# Patient Record
Sex: FEMALE | Race: WHITE | Employment: FULL TIME | URBAN - METROPOLITAN AREA
[De-identification: names, ages, dates, MRNs, and addresses within clinical notes are randomized per-mention and may not be internally consistent; named-entity substitution may affect disease eponyms.]

---

## 2017-02-09 ENCOUNTER — HOSPITAL ENCOUNTER (EMERGENCY)
Facility: CLINIC | Age: 52
Discharge: HOME | End: 2017-02-09
Attending: EMERGENCY MEDICINE | Admitting: EMERGENCY MEDICINE
Payer: COMMERCIAL

## 2017-02-09 ENCOUNTER — TELEPHONE (OUTPATIENT)
Dept: FAMILY MEDICINE | Facility: CLINIC | Age: 52
End: 2017-02-09

## 2017-02-09 VITALS
BODY MASS INDEX: 23.92 KG/M2 | TEMPERATURE: 98.6 F | SYSTOLIC BLOOD PRESSURE: 131 MMHG | WEIGHT: 135 LBS | DIASTOLIC BLOOD PRESSURE: 87 MMHG | HEIGHT: 63 IN | RESPIRATION RATE: 18 BRPM | OXYGEN SATURATION: 96 %

## 2017-02-09 DIAGNOSIS — H53.8 BLURRED VISION: ICD-10-CM

## 2017-02-09 PROCEDURE — 25000125 ZZHC RX 250

## 2017-02-09 PROCEDURE — 99283 EMERGENCY DEPT VISIT LOW MDM: CPT

## 2017-02-09 RX ORDER — PROPARACAINE HYDROCHLORIDE 5 MG/ML
SOLUTION/ DROPS OPHTHALMIC
Status: COMPLETED
Start: 2017-02-09 | End: 2017-02-09

## 2017-02-09 RX ADMIN — PROPARACAINE HYDROCHLORIDE: 5 SOLUTION/ DROPS OPHTHALMIC at 11:35

## 2017-02-09 ASSESSMENT — ENCOUNTER SYMPTOMS
DIZZINESS: 0
HEADACHES: 0
TROUBLE SWALLOWING: 0
SPEECH DIFFICULTY: 0

## 2017-02-09 NOTE — DISCHARGE INSTRUCTIONS
Blurred Vision  Blurred vision is the loss of sharpness of vision and the inability to see small details. Any changes in your vision, whether sudden or gradual, should be checked out by an eye specialist.  Vision changes can be caused by many different things. These include eye disease, side effects of some medicines, or a condition like diabetes. Vision changes should never be ignored. It is common to assume that vision changes are due to needing more powerful vision correction. Making this assumption, many people postpone seeing their health care provider about their vision changes. Delaying care is risky, however. Some eye problems, if left untreated, can lead to permanent vision loss that is not correctable with glasses. This can significantly reduce quality of life.  Home care    Make changes in your home to reduce the risk of falling.    Keep walkways clear of objects you may trip over. Use nonslip pads under rugs.    Do not walk in poorly lit areas.    Be cautious when stepping up and down from curbs and walking on uneven sidewalks.    Brighter lighting in your home may help you see better.  Follow-up care  Follow up with an eye specialist or as advised. There are two types of eye doctor you can consult:     An optometrist is a licensed doctor of optometry. (Optometrists are not medical doctors.) Optometrists specialize in eye exams and may diagnose some eye problems. They also prescribe glasses and contact lenses.    An ophthalmologist is a medical doctor who specializes in eye care. Ophthalmologists diagnose and treat all eye diseases, prescribe medicines, and perform eye surgery. They may also prescribe glasses and contact lenses.  An optometrist can provide a basic screening eye exam for much less cost than an ophthalmologist. The optometrist can tell you if your condition needs the services of an ophthalmologist.  When to seek medical advice  Call your health care provider right away if any of these  occur.    Sudden change in your vision    Eye pain, redness, or discharge from your eyelid    No improvement or worsening of blurriness    Dark spots in your field of vision    Halos around lights    Floaters (dots or strings moving across your field of vision)    Sudden flash of light inside your eye    Dimness of vision    Partial or complete loss of vision    8258-6275 [employer]. [address] [place] [address]. All rights reserved. This information is not intended as a substitute for professional medical care. Always follow your healthcare professional's instructions.

## 2017-02-09 NOTE — ED AVS SNAPSHOT
Emergency Department    [address] [place] [id number]    Phone:  [phone]    Fax:  [phone]                                       Mercedes Patel   MRN: [id number]    Department:   Emergency Department   Date of Visit:  2/9/2017           Patient Information     Date Of Birth          1965        Your diagnoses for this visit were:     Blurred vision        You were seen by Chaka Busch MD.      Follow-up Information     Follow up with Mendez Carr In 1 day.    Why:  3:45 pm today his partner in the clinic        Discharge Instructions         Blurred Vision  Blurred vision is the loss of sharpness of vision and the inability to see small details. Any changes in your vision, whether sudden or gradual, should be checked out by an eye specialist.  Vision changes can be caused by many different things. These include eye disease, side effects of some medicines, or a condition like diabetes. Vision changes should never be ignored. It is common to assume that vision changes are due to needing more powerful vision correction. Making this assumption, many people postpone seeing their health care provider about their vision changes. Delaying care is risky, however. Some eye problems, if left untreated, can lead to permanent vision loss that is not correctable with glasses. This can significantly reduce quality of life.  Home care    Make changes in your home to reduce the risk of falling.    Keep walkways clear of objects you may trip over. Use nonslip pads under rugs.    Do not walk in poorly lit areas.    Be cautious when stepping up and down from curbs and walking on uneven sidewalks.    Brighter lighting in your home may help you see better.  Follow-up care  Follow up with an eye specialist or as advised. There are two types of eye doctor you can consult:     An optometrist is a licensed doctor of optometry. (Optometrists are not medical doctors.) Optometrists specialize in eye  exams and may diagnose some eye problems. They also prescribe glasses and contact lenses.    An ophthalmologist is a medical doctor who specializes in eye care. Ophthalmologists diagnose and treat all eye diseases, prescribe medicines, and perform eye surgery. They may also prescribe glasses and contact lenses.  An optometrist can provide a basic screening eye exam for much less cost than an ophthalmologist. The optometrist can tell you if your condition needs the services of an ophthalmologist.  When to seek medical advice  Call your health care provider right away if any of these occur.    Sudden change in your vision    Eye pain, redness, or discharge from your eyelid    No improvement or worsening of blurriness    Dark spots in your field of vision    Halos around lights    Floaters (dots or strings moving across your field of vision)    Sudden flash of light inside your eye    Dimness of vision    Partial or complete loss of vision    8212-6083 [employer]. [address] [place] [address]. All rights reserved. This information is not intended as a substitute for professional medical care. Always follow your healthcare professional's instructions.          24 Hour Appointment Hotline       To make an appointment at any [facility], call [phone] ([phone]). If you don't have a family doctor or clinic, we will help you find one. [facility] are conveniently located to serve the needs of you and your family.             Review of your medicines      Our records show that you are taking the medicines listed below. If these are incorrect, please call your family doctor or clinic.        Dose / Directions Last dose taken    cyclobenzaprine 5 MG tablet   Commonly known as:  FLEXERIL   Dose:  5 mg   Quantity:  30 tablet        Take 1 tablet (5 mg) by mouth nightly as needed for muscle spasms   Refills:  1        fexofenadine 180 MG tablet   Commonly known as:  ALLEGRA    Dose:  180 mg   Quantity:  90 tablet        Take 1 tablet by mouth daily.   Refills:  1        fluticasone 50 MCG/ACT spray   Commonly known as:  FLONASE   Quantity:  16 g        SPRAY 2 SPRAYS INTO NOSTRIL DAILY   Refills:  2        OMEGA-3 FISH OIL PO        Take by mouth daily   Refills:  0        PROBIOTIC DAILY Caps   Dose:  1 capsule        Take 1 capsule by mouth daily   Refills:  0        vitamin B complex with vitamin C Tabs tablet   Dose:  1 tablet        Take 1 tablet by mouth daily   Refills:  0        vitamin D 1000 UNITS capsule   Dose:  2 capsule        Take 2 capsules by mouth daily   Refills:  0                Orders Needing Specimen Collection     None      Pending Results     No orders found from 2/8/2017 to 2/10/2017.            Pending Culture Results     No orders found from 2/8/2017 to 2/10/2017.             Test Results from your hospital stay            Clinical Quality Measure: Blood Pressure Screening     Your blood pressure was checked while you were in the emergency department today. The last reading we obtained was  BP: 131/87 mmHg . Please read the guidelines below about what these numbers mean and what you should do about them.  If your systolic blood pressure (the top number) is less than 120 and your diastolic blood pressure (the bottom number) is less than 80, then your blood pressure is normal. There is nothing more that you need to do about it.  If your systolic blood pressure (the top number) is 120-139 or your diastolic blood pressure (the bottom number) is 80-89, your blood pressure may be higher than it should be. You should have your blood pressure rechecked within a year by a primary care provider.  If your systolic blood pressure (the top number) is 140 or greater or your diastolic blood pressure (the bottom number) is 90 or greater, you may have high blood pressure. High blood pressure is treatable, but if left untreated over time it can put you at risk for heart  attack, stroke, or kidney failure. You should have your blood pressure rechecked by a primary care provider within the next 4 weeks.  If your provider in the emergency department today gave you specific instructions to follow-up with your doctor or provider even sooner than that, you should follow that instruction and not wait for up to 4 weeks for your follow-up visit.        Thank you for choosing [facility]       Thank you for choosing [facility] for your care. Our goal is always to provide you with excellent care. Hearing back from our patients is one way we can continue to improve our services. Please take a few minutes to complete the written survey that you may receive in the mail after you visit with us. Thank you!        [employer] Information     [employer] gives you secure access to your electronic health record. If you see a primary care provider, you can also send messages to your care team and make appointments. If you have questions, please call your primary care clinic.  If you do not have a primary care provider, please call [phone] and they will assist you.        Care EveryWhere ID     This is your Care EveryWhere ID. This could be used by other organizations to access your [facility] medical records  [id number]        After Visit Summary       This is your record. Keep this with you and show to your community pharmacist(s) and doctor(s) at your next visit.

## 2017-02-09 NOTE — ED AVS SNAPSHOT
Emergency Department    [address] [place] [id number]    Phone:  [phone]    Fax:  [phone]                                       Mercedes Patel   MRN: [id number]    Department:   Emergency Department   Date of Visit:  2/9/2017           After Visit Summary Signature Page     I have received my discharge instructions, and my questions have been answered. I have discussed any challenges I see with this plan with the nurse or doctor.    ..........................................................................................................................................  Patient/Patient Representative Signature      ..........................................................................................................................................  Patient Representative Print Name and Relationship to Patient    ..................................................               ................................................  Date                                            Time    ..........................................................................................................................................  Reviewed by Signature/Title    ...................................................              ..............................................  Date                                                            Time

## 2017-02-09 NOTE — TELEPHONE ENCOUNTER
Patient is currently at work in a [employer] and has blurry vision-  has swollen eyes-dx with blepharitis for a long time-never had blurred vision with this- has seen multiple eye specialists for it  today eyes are blurry- /103 left arm, 142/90 right arm  Denies HA, drowsiness, sob,CP, nose bleeds, dizziness  huddled with Zenaida Arriola CNP- advised that patient should go to the ER  Patient agrees with recomendation    Guideline used:  Telephone Triage Protocols for Nurses, Fifth Edition, Hope Hector,RN  [facility]  [phone]

## 2017-02-09 NOTE — ED PROVIDER NOTES
History     Chief Complaint:  Hypertension    HPI   Mercedes Patel is a 51 year old female who presents to the emergency department today for evaluation of hypertension. The patient was at home and noticed while reading that her vision seemed more blurry. 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 She put her reading glasses on and was able to read but stated that the words were fuzzy and her blurriness seemed at it's worst when she was reading the computer, worse in the left eye but still blurry in both. She had another nurse take her blood pressure and it was in the 130's systolic. She called her doctor and was told to present to the ED. She has no pain in either eye but notes that since April her eyes have seemed swollen and sore. She is not currently on any blood pressure medications but reports she was on Amlodipine several years ago and Spironolactone for her acne up until a year ago that had helped with her blood pressure. Since going off of that medication she has felt that her blood pressure has been slowly creeping up, with her last blood pressure at the clinic being in the 130's/80's. She does not wear contacts but has seen multiple doctors for her chronic blepharitis, which she notes has not been any better or worse as of late. She also notes that she has a stye in her left eye. She denies smoking or any drug or alcohol use. She has had headaches previously but never been diagnosed with migraines. She denies any change in her recent stress or work load. Additionally, the patient has had no trouble with speech, trouble swallowing or any dizziness.     Allergies:  Erythromycin, rash   Penicillins, rash   Seasonal allergies  Sulfa Drugs, rash      Medications:    Omega 3 Fatty Acids  Flexeril   Vitamin B complex with vitamin C  Flonase  Vitamin D  Allegra    Past Medical History:    Nontoxic multinodular  "goiter  Disorders of bursae and tendons in shoulder region  Perennial allergic rhinitis  Headache  Unspecified sinusitis   Leiomyoma of uterus  Other acne  GERD  Menorrhagia  Chronic fatigue  Insomnia  Constipation   Nausea    Past Surgical History:    Tonsillectomy   Hc MRI brain w/o contrast (2004)   Hc CT maxillofacial w/o contrast (2006)   Sono pelvis complete (2006, 2008, 2011)   Hysteroscopy, ablation endometrium (2008)   Hc us soft tissue head/neck (2008, 2010)   US abdomen limited portable (2011)   Endoscopy     Family History:    Father - Hypertension, Lipids, blood disease   Mother - Lipids, Hypertension   Maternal Grandfather - Cerebrovascular Disease, Colorectal Cancer     Social History:  The patient was unaccompanied to the ED.  Smoking Status: Negative  Smokeless Tobacco: Negative  Alcohol Use: Rarely  Marital Status:   [2]     Review of Systems   HENT: Negative for trouble swallowing.    Eyes: Positive for visual disturbance (blurry vision bilaterally, worse on the left).   Neurological: Negative for dizziness, speech difficulty and headaches.     Physical Exam   Vitals:   Patient Vitals for the past 24 hrs:   BP Temp Temp src Heart Rate Resp SpO2 Height Weight   02/09/17 1045 131/87 mmHg - - - - 96 % - -   02/09/17 1030 (!) 126/96 mmHg - - - - 97 % - -   02/09/17 1015 156/86 mmHg - - - - 97 % - -   02/09/17 1000 (!) 158/91 mmHg - - - - 96 % - -   02/09/17 0945 (!) 155/99 mmHg - - - - (!) 77 % - -   02/09/17 0930 (!) 162/110 mmHg 98.6  F (37  C) Oral 121 18 100 % 1.6 m (5' 3\") 61.236 kg (135 lb)      Physical Exam     GENERAL: well developed, pleasant  HEAD: atraumatic  EYES: pupils reactive, extraocular muscles intact, conjunctivae normal, stye to left lower lid, pupils equal, sharp fundoscopic disk, no uptake in fluorescein stain, interocular pressure 16 and 17  ENT:  mucus membranes moist  NECK:  trachea midline, normal range of motion  RESPIRATORY: no tachypnea, breath sounds clear to " auscultation   CVS: normal S1/S2, no murmurs, intact distal pulses  ABDOMEN: soft, nontender, nondistention  MUSCULOSKELETAL: no deformities  SKIN: warm and dry, no acute rashes or ulceration  NEURO: GCS 15, cranial nerves intact, alert and oriented x3  PSYCH:  Mood/affect normal    Emergency Department Course     Emergency Department Course:  Nursing notes and vitals reviewed.  I performed an exam of the patient as documented above.   At 1100 the patient was rechecked.   1123 I spoke with Dr. Wagner of the opthalmology service regarding patient's presentation, findings, and plan of care. He will see the patient later today at 3:45 PM.   1128 Patient was rechecked.   I discussed the treatment plan with the patient. She expressed understanding of this plan and consented to discharge. She will be discharged home with instructions for care and follow up. In addition, the patient will return to the emergency department if their symptoms persist, worsen, if new symptoms arise or if there is any concern.  All questions were answered.    Impression & Plan      Medical Decision Making:  Mercedes Patel is a 51 year old female who presents with blurred vision. Considered differential including ocular migraine or atypical migraine, a corneal issue given her recent stye and blepharitis versus others. Fundoscopically looks normal, did not see any cell or flare to suggest iritis. She does have a small stye in the inferior aspect of her left eye. Did speak with her ophthalmologist and they can see her today at 3:45. There was concern about her blood pressure and I'm having trouble correlating this. Her blood pressure has come down without treatment. Diastolic is still slightly high. Do not feel that this represents that a stroke or MS. Will have her follow up with opthalmology today as noted.     Diagnosis:    ICD-10-CM    1. Blurred vision H53.8      Disposition:   Discharge to home    Scribe Disclosure:  I, Donna Hernandez, am  serving as a scribe at 9:35 AM on 2/9/2017 to document services personally performed by Chaka Busch MD, based on my observations and the provider's statements to me.    2/9/2017    EMERGENCY DEPARTMENT        Chaka Busch MD  02/10/17 5111

## 2017-02-24 ENCOUNTER — VIRTUAL VISIT (OUTPATIENT)
Dept: FAMILY MEDICINE | Facility: OTHER | Age: 52
End: 2017-02-24

## 2017-02-25 NOTE — PROGRESS NOTES
"Date:   Clinician: Jolene Salvador  Clinician NPI: [id number]  Patient: Mercedes Patel  Patient : 1965  Patient Address: [address] [place] [address]  Patient Phone: [phone]  Visit Protocol: Yeast Infection  Patient Summary:  Mercedes is a 51 year old ( : 1965 ) female who initiated a Zip for a presumed vaginal yeast infection. When asked the question \"Do you have a [facility] primary care physician?\", Mercedes responded \"Yes\".    0-5 days ago she began noticing vaginal pruritus and perivulvar pruritus .   She denies having vaginal discharge, perivulvar rash, fever, and abdominal pain or lesions.  She has had one (1) occurrence in the past year and the current symptoms are similar to previous yeast infections. She has not tried to treat her current symptoms with any medication.   She denies taking antibiotics in the past 2 weeks. She prefers a fluconazole (Diflucan) Pill.   She states she is not pregnant and denies breastfeeding. She no longer menstruates.   She denies risk factors for sexually transmitted infections. She does NOT smoke or use smokeless tobacco.    MEDICATIONS:   Ibuprofen (Advil, Motrin)   , ALLERGIES:   penicillin/amoxicillin/augmentin and sulfa (Bactrim/Septra)    Clinician Response:  Dear Mercedes,  Based on the information you have provided, you likely have a vaginal yeast infection which is a common infection of the vagina caused by a fungus.   I am prescribing:   Fluconazole (Diflucan) 150 mg oral tablet to treat your yeast infection. Swallow one (1) tablet as a single dose. There are no refills with this prescription.   While you have yeast infection symptoms, do the following:      Avoid irritants such as scented bath products, tampons, pads, or vaginal sprays and powders.    Avoid douching.    Wear cotton underwear and if you are comfortable doing so, do not wear underwear to bed.    Avoid hot tubs and whirlpool spas.     Most women notice " improvement in their symptoms within 1-2 days after starting treatment with complete clearing in 5-7 days. If your symptoms have not improved in 3 days or not resolved in 10 days, please schedule an appointment to see your primary care clinician for an evaluation as your symptoms may be caused by an infection other than yeast. Sometimes yeast infections can be associated with other vaginal infections or with sexually transmitted infections (STIs). If you think you may be at risk for a STI please be seen in a clinic.   Diagnosis: Candida Vulvovaginitis  Diagnosis ICD: B37.3  Prescription: fluconazole (Diflucan) 150mg oral tablet 1 tablet, 1 days supply. Take one tablet by mouth one time a day for 1 day. Refills: 0, Refill as needed: no, Allow substitutions: yes  Prescription Sent At: February 24 20:02:53, 2017  Pharmacy: [id number] - [phone] - [address], [place] [address]

## 2017-02-27 ENCOUNTER — OFFICE VISIT (OUTPATIENT)
Dept: FAMILY MEDICINE | Facility: CLINIC | Age: 52
End: 2017-02-27
Payer: COMMERCIAL

## 2017-02-27 VITALS
WEIGHT: 133 LBS | TEMPERATURE: 99.6 F | HEART RATE: 78 BPM | RESPIRATION RATE: 12 BRPM | BODY MASS INDEX: 23.57 KG/M2 | SYSTOLIC BLOOD PRESSURE: 122 MMHG | OXYGEN SATURATION: 99 % | HEIGHT: 63 IN | DIASTOLIC BLOOD PRESSURE: 80 MMHG

## 2017-02-27 DIAGNOSIS — N39.0 ACUTE UTI: ICD-10-CM

## 2017-02-27 DIAGNOSIS — G89.29 CHRONIC RIGHT-SIDED LOW BACK PAIN WITH RIGHT-SIDED SCIATICA: ICD-10-CM

## 2017-02-27 DIAGNOSIS — Z11.59 NEED FOR HEPATITIS C SCREENING TEST: ICD-10-CM

## 2017-02-27 DIAGNOSIS — E04.2 NONTOXIC MULTINODULAR GOITER: ICD-10-CM

## 2017-02-27 DIAGNOSIS — Z00.01 ENCOUNTER FOR ROUTINE ADULT HEALTH EXAMINATION WITH ABNORMAL FINDINGS: Primary | ICD-10-CM

## 2017-02-27 DIAGNOSIS — J32.0 CHRONIC MAXILLARY SINUSITIS: ICD-10-CM

## 2017-02-27 DIAGNOSIS — M54.41 CHRONIC RIGHT-SIDED LOW BACK PAIN WITH RIGHT-SIDED SCIATICA: ICD-10-CM

## 2017-02-27 DIAGNOSIS — J30.89 PERENNIAL ALLERGIC RHINITIS, UNSPECIFIED ALLERGIC RHINITIS TRIGGER: ICD-10-CM

## 2017-02-27 DIAGNOSIS — R82.90 NONSPECIFIC FINDING ON EXAMINATION OF URINE: ICD-10-CM

## 2017-02-27 DIAGNOSIS — R30.0 DYSURIA: ICD-10-CM

## 2017-02-27 DIAGNOSIS — Z13.6 CARDIOVASCULAR SCREENING; LDL GOAL LESS THAN 160: ICD-10-CM

## 2017-02-27 LAB
ALBUMIN UR-MCNC: 100 MG/DL
APPEARANCE UR: ABNORMAL
BACTERIA #/AREA URNS HPF: ABNORMAL /HPF
BILIRUB UR QL STRIP: NEGATIVE
COLOR UR AUTO: YELLOW
GLUCOSE UR STRIP-MCNC: NEGATIVE MG/DL
HGB UR QL STRIP: ABNORMAL
KETONES UR STRIP-MCNC: 40 MG/DL
LEUKOCYTE ESTERASE UR QL STRIP: ABNORMAL
NITRATE UR QL: POSITIVE
NON-SQ EPI CELLS #/AREA URNS LPF: ABNORMAL /LPF
PH UR STRIP: 6 PH (ref 5–7)
RBC #/AREA URNS AUTO: ABNORMAL /HPF (ref 0–2)
SP GR UR STRIP: 1.01 (ref 1–1.03)
URN SPEC COLLECT METH UR: ABNORMAL
UROBILINOGEN UR STRIP-ACNC: 0.2 EU/DL (ref 0.2–1)
WBC #/AREA URNS AUTO: >100 /HPF (ref 0–2)

## 2017-02-27 PROCEDURE — 99396 PREV VISIT EST AGE 40-64: CPT | Performed by: NURSE PRACTITIONER

## 2017-02-27 PROCEDURE — 87086 URINE CULTURE/COLONY COUNT: CPT | Performed by: NURSE PRACTITIONER

## 2017-02-27 PROCEDURE — 87088 URINE BACTERIA CULTURE: CPT | Performed by: NURSE PRACTITIONER

## 2017-02-27 PROCEDURE — 80061 LIPID PANEL: CPT | Performed by: NURSE PRACTITIONER

## 2017-02-27 PROCEDURE — 99212 OFFICE O/P EST SF 10 MIN: CPT | Mod: 25 | Performed by: NURSE PRACTITIONER

## 2017-02-27 PROCEDURE — 80048 BASIC METABOLIC PNL TOTAL CA: CPT | Performed by: NURSE PRACTITIONER

## 2017-02-27 PROCEDURE — 84443 ASSAY THYROID STIM HORMONE: CPT | Performed by: NURSE PRACTITIONER

## 2017-02-27 PROCEDURE — 86803 HEPATITIS C AB TEST: CPT | Performed by: NURSE PRACTITIONER

## 2017-02-27 PROCEDURE — 36415 COLL VENOUS BLD VENIPUNCTURE: CPT | Performed by: NURSE PRACTITIONER

## 2017-02-27 PROCEDURE — 81001 URINALYSIS AUTO W/SCOPE: CPT | Performed by: NURSE PRACTITIONER

## 2017-02-27 PROCEDURE — 87186 SC STD MICRODIL/AGAR DIL: CPT | Performed by: NURSE PRACTITIONER

## 2017-02-27 RX ORDER — PHENAZOPYRIDINE HYDROCHLORIDE 200 MG/1
200 TABLET, FILM COATED ORAL 3 TIMES DAILY PRN
Qty: 12 TABLET | Refills: 0 | Status: SHIPPED | OUTPATIENT
Start: 2017-02-27 | End: 2017-06-29

## 2017-02-27 RX ORDER — NEOMYCIN SULFATE, POLYMYXIN B SULFATE AND DEXAMETHASONE 3.5; 10000; 1 MG/ML; [USP'U]/ML; MG/ML
SUSPENSION/ DROPS OPHTHALMIC
COMMUNITY
Start: 2017-02-09 | End: 2017-06-29

## 2017-02-27 RX ORDER — FLUCONAZOLE 150 MG/1
TABLET ORAL
Refills: 0 | COMMUNITY
Start: 2017-02-24 | End: 2017-06-29

## 2017-02-27 RX ORDER — CIPROFLOXACIN 500 MG/1
500 TABLET, FILM COATED ORAL 2 TIMES DAILY
Qty: 14 TABLET | Refills: 0 | Status: SHIPPED | OUTPATIENT
Start: 2017-02-27 | End: 2017-06-29

## 2017-02-27 NOTE — NURSING NOTE
"Chief Complaint   Patient presents with     Physical       Initial /80 (Cuff Size: Adult Regular)  Pulse 78  Temp 99.6  F (37.6  C) (Tympanic)  Resp 12  Ht 5' 3\" (1.6 m)  Wt 133 lb (60.3 kg)  SpO2 99%  BMI 23.56 kg/m2 Estimated body mass index is 23.56 kg/(m^2) as calculated from the following:    Height as of this encounter: 5' 3\" (1.6 m).    Weight as of this encounter: 133 lb (60.3 kg).  Medication Reconciliation: complete   Serina Wagner, CMA    "

## 2017-02-27 NOTE — PROGRESS NOTES
SUBJECTIVE:     CC: Mercedes Patel is an 51 year old woman who presents for preventive health visit.     Physical   Annual:     Getting at least 3 servings of Calcium per day::  Yes    Bi-annual eye exam::  Yes    Dental care twice a year::  Yes    Sleep apnea or symptoms of sleep apnea::  None    Diet::  Regular (no restrictions)    Frequency of exercise::  1 day/week    Taking medications regularly::  Yes    Medication side effects::  Not applicable    Additional concerns today::  YES        Today's PHQ-2 Score:   PHQ-2 ( 1999 Pfizer) 2/27/2017   Little interest or pleasure in doing things Not at all   Feeling down, depressed or hopeless Not at all   PHQ-2 Score 0       Abuse: Current or Past(Physical, Sexual or Emotional)- No  Do you feel safe in your environment - Yes    Social History   Substance Use Topics     Smoking status: Never Smoker     Smokeless tobacco: Never Used     Alcohol use 0.0 oz/week     0 Standard drinks or equivalent per week      Comment: rarely 1/month     The patient does not drink >3 drinks per day nor >7 drinks per week.    Recent Labs   Lab Test  06/22/15   1138  01/17/13   1126   CHOL  229*  207*   HDL  72  71   LDL  135*  120   TRIG  109  85   CHOLHDLRATIO  3.2  2.9       Reviewed orders with patient.  Reviewed health maintenance and updated orders accordingly - Yes    Mammo Decision Support:  Patient over age 50, mutual decision to screen reflected in health maintenance.  Pertinent mammograms are reviewed under the imaging tab.    History of abnormal Pap smear: NO - age 30- 65 PAP every 3 years recommended  All Histories reviewed and updated in Epic.  Past Medical History   Diagnosis Date     Acne      Allergic rhinitis      Chronic fatigue      Constipation      Disorders of bursae and tendons in shoulder region, unspecified      Lt shoulder s/p steroid injection 7/02     GERD (gastroesophageal reflux disease) 4/09     Headache(784.0)      with recurrent sinusitis and  allergies,Dr. Traore ENT      Insomnia      Leiomyoma of uterus, unspecified 2/06     2 small fibroids noted on pelvic us     Menorrhagia 2008     tx'd with ablation     Nausea      weight loss / endoscopy     Nontoxic multinodular goiter      followed by Dr. Raymundo.  Biopsy nl     Other acne      derm Dr. Ventura, started spironolactone 3/08     Perennial allergic rhinitis 2006     gets allergy injections     Unspecified sinusitis (chronic)      takes probiotic- [employer] chronic, recurrent sinusitis, sinus disease noted on ct, mri      Past Surgical History   Procedure Laterality Date     Tonsillectomy       Hc mri brain w/o contrast  2004     MRI brain pos sinusitis     Hc ct maxillofacial w/o contrast  2006     chronic sinus disease     Sono pelvis complete  2/06, 3/08,12/11     fibroids     Hysteroscopy,ablation endometrium  2008          Hc us soft tissue head/neck  2/08, 3/10     thyroid 3 nodules unchanged, f/b endocrine, s/p FNA #4 isthmus nodule benign 9/10     Us abdomen limited portable  2011     nl     Endoscopy         Social Hx:  .  Has 3 children. RN at [employer].      ROS:  C: NEGATIVE for fever, chills, change in weight  I: NEGATIVE for worrisome rashes, moles or lesions  E: NEGATIVE for vision changes or irritation. Erythema left lower lid margin. Using Maxitrol ointment.   ENT: NEGATIVE for ear, mouth and throat problems. Chronic sinusitis. Takes Allegra.   R: NEGATIVE for significant cough or SOB  B: NEGATIVE for masses, tenderness or discharge  CV: NEGATIVE for chest pain, palpitations or peripheral edema  GI: NEGATIVE for nausea, abdominal pain, heartburn, or change in bowel habits. MiraLax for constipation   : NEGATIVE for unusual vaginal symptoms. No vaginal bleeding. H/O endometrial ablation. For 2 days has urinary urgency and dysuria.    E: POSITIVE for benign thyroid nodules, has been to endocrinology- no treatment needed   M: NEGATIVE for significant arthralgias or myalgia.  "Chronic back pain with intermittent right sided sciatica   N: NEGATIVE for weakness, dizziness or paresthesias  P: NEGATIVE for changes in mood or affect       OBJECTIVE:     /80 (Cuff Size: Adult Regular)  Pulse 78  Temp 99.6  F (37.6  C) (Tympanic)  Resp 12  Ht 5' 3\" (1.6 m)  Wt 133 lb (60.3 kg)  SpO2 99%  BMI 23.56 kg/m2  EXAM:  GENERAL APPEARANCE: healthy, alert and no distress  EYES:  PERRL , EOMI. Lower lid margin is red. Sclera clear.   HENT: ear canals and TM's normal, nose and mouth without ulcers or lesions, oropharynx clear and oral mucous membranes moist  NECK: no adenopathy, no asymmetry, masses, or scars and thyroid normal to palpation  RESP: lungs clear to auscultation - no rales, rhonchi or wheezes  BREAST: normal without masses, tenderness or nipple discharge and no palpable axillary masses or adenopathy  CV: regular rate and rhythm, normal S1 S2, no S3 or S4, no murmur, click or rub, no peripheral edema and peripheral pulses strong  ABDOMEN: soft, nontender, no hepatosplenomegaly, no masses and bowel sounds normal  MS: no musculoskeletal defects are noted and gait is age appropriate without ataxia  SKIN: no suspicious lesions or rashes  NEURO: Normal strength and tone, sensory exam grossly normal, mentation intact and speech normal  PSYCH: mentation appears normal and affect normal/bright    Results for orders placed or performed in visit on 02/27/17   UA reflex to Microscopic and Culture   Result Value Ref Range    Color Urine Yellow     Appearance Urine Slightly Cloudy     Glucose Urine Negative NEG mg/dL    Bilirubin Urine Negative NEG    Ketones Urine 40 (A) NEG mg/dL    Specific Gravity Urine 1.010 1.003 - 1.035    Blood Urine Large (A) NEG    pH Urine 6.0 5.0 - 7.0 pH    Protein Albumin Urine 100 (A) NEG mg/dL    Urobilinogen Urine 0.2 0.2 - 1.0 EU/dL    Nitrite Urine Positive (A) NEG    Leukocyte Esterase Urine Large (A) NEG    Source Midstream Urine    Urine Microscopic   Result Value " Ref Range    WBC Urine >100 (A) 0 - 2 /HPF    RBC Urine 5-10 (A) 0 - 2 /HPF    Squamous Epithelial /LPF Urine Moderate (A) FEW /LPF    Bacteria Urine Many (A) NEG /HPF         ASSESSMENT/PLAN:     Mercedes was seen today for physical.    Diagnoses and all orders for this visit:    Encounter for routine adult health examination with abnormal findings  -     Basic metabolic panel  (Ca, Cl, CO2, Creat, Gluc, K, Na, BUN)    Acute UTI  -     ciprofloxacin (CIPRO) 500 MG tablet; Take 1 tablet (500 mg) by mouth 2 times daily  -     phenazopyridine (PYRIDIUM) 200 MG tablet; Take 1 tablet (200 mg) by mouth 3 times daily as needed for pain    Nontoxic multinodular goiter  -     TSH with free T4 reflex    Perennial allergic rhinitis, unspecified allergic rhinitis trigger    Chronic maxillary sinusitis    Chronic right-sided low back pain with right-sided sciatica    Dysuria  -     UA reflex to Microscopic and Culture    Nonspecific finding on examination of urine  -     Urine Culture Aerobic Bacterial    Need for hepatitis C screening test  -     Hepatitis C Screen Reflex to HCV RNA Quant and Genotype  -     Urine Microscopic    CARDIOVASCULAR SCREENING; LDL GOAL LESS THAN 160  -     Lipid Profile with reflex to direct LDL      Discussed condition (UTI) and symptomatic cares  I have discussed with patient the risks, benefits, medications, treatment options and modalities.  -Cipro 500mg BID x 7 days  -Pyridium 200mg TID x 3 days max  Follow up if not improving as expected and in May 2017 for pelvic exam and pap         COUNSELING:  Reviewed preventive health counseling, as reflected in patient instructions  Special attention given to:        Regular exercise       Healthy diet/nutrition       Vision screening       Osteoporosis Prevention/Bone Health         reports that she has never smoked. She has never used smokeless tobacco.    Estimated body mass index is 23.91 kg/(m^2) as calculated from the following:    Height as of  "2/9/17: 5' 3\" (1.6 m).    Weight as of 2/9/17: 135 lb (61.2 kg).       Counseling Resources:  ATP IV Guidelines  Pooled Cohorts Equation Calculator  Breast Cancer Risk Calculator  FRAX Risk Assessment  ICSI Preventive Guidelines  Dietary Guidelines for Americans, 2010  USDA's MyPlate  ASA Prophylaxis  Lung CA Screening    JOE Santillan [facility] [place]  Answers for HPI/ROS submitted by the patient on 2/27/2017   PHQ-2 Depressed: Not at all, Not at all  PHQ-2 Score: 0    "

## 2017-02-27 NOTE — MR AVS SNAPSHOT
After Visit Summary   2/27/2017    Mercedes Patel    MRN: [id number]           Patient Information     Date Of Birth          1965        Visit Information        Provider Department      2/27/2017 12:15 PM Zenaida Arriola APRN CNP [facility] [place]        Today's Diagnoses     Encounter for routine adult health examination with abnormal findings    -  1    Acute UTI        Need for hepatitis C screening test        Dysuria        Nonspecific finding on examination of urine        Nontoxic multinodular goiter        CARDIOVASCULAR SCREENING; LDL GOAL LESS THAN 160        Perennial allergic rhinitis, unspecified allergic rhinitis trigger           Follow-ups after your visit        Who to contact     If you have questions or need follow up information about today's clinic visit or your schedule please contact [facility] directly at [phone].  Normal or non-critical lab and imaging results will be communicated to you by MyChart, letter or phone within 4 business days after the clinic has received the results. If you do not hear from us within 7 days, please contact the clinic through MyChart or phone. If you have a critical or abnormal lab result, we will notify you by phone as soon as possible.  Submit refill requests through ChatStat or call your pharmacy and they will forward the refill request to us. Please allow 3 business days for your refill to be completed.          Additional Information About Your Visit        MyChart Information     ChatStat gives you secure access to your electronic health record. If you see a primary care provider, you can also send messages to your care team and make appointments. If you have questions, please call your primary care clinic.  If you do not have a primary care provider, please call [phone] and they will assist you.        Care EveryWhere ID     This is your Care EveryWhere ID. This could be used by other organizations  "to access your [facility] medical records  [id number]        Your Vitals Were     Pulse Temperature Respirations Height Pulse Oximetry BMI (Body Mass Index)    78 99.6  F (37.6  C) (Tympanic) 12 5' 3\" (1.6 m) 99% 23.56 kg/m2       Blood Pressure from Last 3 Encounters:   02/27/17 122/80   02/09/17 131/87   12/30/16 134/77    Weight from Last 3 Encounters:   02/27/17 133 lb (60.3 kg)   02/09/17 135 lb (61.2 kg)   12/30/16 139 lb (63 kg)              We Performed the Following     Basic metabolic panel  (Ca, Cl, CO2, Creat, Gluc, K, Na, BUN)     Hepatitis C Screen Reflex to HCV RNA Quant and Genotype     Lipid Profile with reflex to direct LDL     TSH with free T4 reflex     UA reflex to Microscopic and Culture     Urine Culture Aerobic Bacterial     Urine Microscopic          Today's Medication Changes          These changes are accurate as of: 2/27/17  1:02 PM.  If you have any questions, ask your nurse or doctor.               Start taking these medicines.        Dose/Directions    ciprofloxacin 500 MG tablet   Commonly known as:  CIPRO   Used for:  Acute UTI   Started by:  Zenaida Arriola APRN CNP        Dose:  500 mg   Take 1 tablet (500 mg) by mouth 2 times daily   Quantity:  14 tablet   Refills:  0            Where to get your medicines      These medications were [place] [facility] [place] [address] [place] [address]     Phone:  [phone]     ciprofloxacin 500 MG tablet                Primary Care Provider Office Phone # Fax #    JOE Santillan [phone]       [address]  [place] [address]        Thank you!     Thank you for choosing [facility] [place]  for your care. Our goal is always to provide you with excellent care. Hearing back from our patients is one way we can continue to improve our services. Please take a few minutes to complete the written survey that " you may receive in the mail after your visit with us. Thank you!             Your Updated Medication List - Protect others around you: Learn how to safely use, store and throw away your medicines at www.disposemymeds.org.          This list is accurate as of: 2/27/17  1:02 PM.  Always use your most recent med list.                   Brand Name Dispense Instructions for use    ciprofloxacin 500 MG tablet    CIPRO    14 tablet    Take 1 tablet (500 mg) by mouth 2 times daily       cyclobenzaprine 5 MG tablet    FLEXERIL    30 tablet    Take 1 tablet (5 mg) by mouth nightly as needed for muscle spasms       fexofenadine 180 MG tablet    ALLEGRA    90 tablet    Take 1 tablet by mouth daily.       fluconazole 150 MG tablet    DIFLUCAN     Reported on 2/27/2017       fluticasone 50 MCG/ACT spray    FLONASE    16 g    SPRAY 2 SPRAYS INTO NOSTRIL DAILY       neomycin-polymyxin-dexamethasone [id number] Susp ophthalmic susp    MAXITROL         OMEGA-3 FISH OIL PO      Take by mouth daily       PROBIOTIC DAILY Caps      Take 1 capsule by mouth daily       vitamin B complex with vitamin C Tabs tablet      Take 1 tablet by mouth daily       vitamin D 1000 UNITS capsule      Take 2 capsules by mouth daily

## 2017-02-28 LAB
ANION GAP SERPL CALCULATED.3IONS-SCNC: 9 MMOL/L (ref 3–14)
BUN SERPL-MCNC: 9 MG/DL (ref 7–30)
CALCIUM SERPL-MCNC: 9.1 MG/DL (ref 8.5–10.1)
CHLORIDE SERPL-SCNC: 101 MMOL/L (ref 94–109)
CHOLEST SERPL-MCNC: 252 MG/DL
CO2 SERPL-SCNC: 28 MMOL/L (ref 20–32)
CREAT SERPL-MCNC: 0.64 MG/DL (ref 0.52–1.04)
GFR SERPL CREATININE-BSD FRML MDRD: NORMAL ML/MIN/1.7M2
GLUCOSE SERPL-MCNC: 87 MG/DL (ref 70–99)
HCV AB SERPL QL IA: NORMAL
HDLC SERPL-MCNC: 86 MG/DL
LDLC SERPL CALC-MCNC: 151 MG/DL
NONHDLC SERPL-MCNC: 166 MG/DL
POTASSIUM SERPL-SCNC: 3.8 MMOL/L (ref 3.4–5.3)
SODIUM SERPL-SCNC: 138 MMOL/L (ref 133–144)
TRIGL SERPL-MCNC: 77 MG/DL
TSH SERPL DL<=0.005 MIU/L-ACNC: 0.78 MU/L (ref 0.4–4)

## 2017-03-01 LAB
BACTERIA SPEC CULT: ABNORMAL
MICRO REPORT STATUS: ABNORMAL
MICROORGANISM SPEC CULT: ABNORMAL
SPECIMEN SOURCE: ABNORMAL

## 2017-05-23 ENCOUNTER — TRANSFERRED RECORDS (OUTPATIENT)
Dept: HEALTH INFORMATION MANAGEMENT | Facility: CLINIC | Age: 52
End: 2017-05-23

## 2017-06-02 DIAGNOSIS — L70.0 ACNE VULGARIS: Primary | ICD-10-CM

## 2017-06-02 LAB — POTASSIUM SERPL-SCNC: 4.5 MMOL/L (ref 3.4–5.3)

## 2017-06-02 PROCEDURE — 84132 ASSAY OF SERUM POTASSIUM: CPT

## 2017-06-02 PROCEDURE — 36415 COLL VENOUS BLD VENIPUNCTURE: CPT

## 2017-06-27 ENCOUNTER — VIRTUAL VISIT (OUTPATIENT)
Dept: FAMILY MEDICINE | Facility: OTHER | Age: 52
End: 2017-06-27

## 2017-06-27 NOTE — PROGRESS NOTES
"Date:   Clinician: Joel Wegener  Clinician NPI: [id number]  Patient: Mercedes Patel  Patient : 1965  Patient Address: [address] [place] [id number]  Patient Phone: [phone]  Visit Protocol: URI  Patient Summary:  Mercedes is a 52 year old ( : 1965 ) female who initiated a Visit for a presumed sinus infection. When asked the question \"Please sign me up to receive news, health information and promotions. \", Mercedes responded \"No\".     Her symptoms started suddenly 3-6 days ago and consist of hoarse voice, nasal congestion, post-nasal drainage, malaise, myalgias, cough, and sore throat.   She denies rhinitis, fever, chills, loss of appetite, dysphagia, itchy eyes, chest pain, ear pain, dyspnea, nausea, petechial or purpuric rash, and vomiting. She denies a history of facial surgery.   Her moderate nasal secretions are yellow. Her moderate facial pain or pressure feels worse when bending over or leaning forward and is located on both sides of her head. The facial pain or pressure started after the onset of other URI symptoms.  She has teeth pain and is confident the tooth pain is not from a cavity, recent dental work or other mouth problems. Mercedes has a moderate headache. The headache did not start before her other symptoms and is located on both sides of her head.   She has a mildly painful sore throat. The patient denies having white spots on the tonsils similar to a sample strep throat image provided. She has not been exposed to Strep. When asked to feel her neck she denied feeling enlarged lymph nodes. She denies axillary lymphadenopathy.   Her mild (a few coughs/hr) non-productive cough is NOT more bothersome at night. She believes the cough is caused by post-nasal drainage.    Mercedes denies having COPD or other chronic lung disease.   Pulse: self-reported pulse rate as: 13 beats in 10 seconds.    Weight (in lbs): 132   She states she is not pregnant and denies " breastfeeding. She no longer menstruates.   Mercedes does not smoke or use smokeless tobacco.   Additional information as reported by the patient (free text): I have chronic sinusitis I feel like I have acute infection   MEDICATIONS:  Fluticasone (Flonase), pseudoephedrine (Sudafed, Dimetapp), diphenhydramine (Benadryl), ibuprofen (Advil, Motrin), and fexofenadine (Allegra)  , ALLERGIES:   sulfa (Bactrim/Septra) and penicillin/amoxicillin/augmentin    Clinician Response:  Dear Mercedes,  Based on the information you have provided, you likely have  acute sinusitis, otherwise known as a sinus infection.   Sinus pressure occurs when the tissues lining your sinuses become swollen and inflamed. Afrin nasal spray decreases the swelling to provide the quickest and most effective relief from sinus pressure.  Use oxymetazoline (Afrin, or store brand) nasal spray. Spray once in each nostril twice per day for a maximum of 3 days. Using this medication more frequently or longer than recommended may cause nasal congestion to reoccur or worsen. This is an over-the-counter medication you can find at most pharmacies.   Unless your are allergic to the over-the-counter medication(s) below, I recommend using:   Ibuprofen. Take 1-3 200mg tablets (200-600mg) every 8 hours to help with the discomfort. Make sure to take the ibuprofen with food. Do not exceed 2400mg in 24 hours. This is an over-the-counter medication that does not require a prescription.   Try the following to help with your throat pain and discomfort:     Use throat lozenges    Gargle with warm salt water (1/4 teaspoon of salt per 8 ounce glass of water).    Suck on frozen items such as Popsicles or ice cubes.     Call 911 or go to the emergency room if you feel that your throat is closing off, you suddenly develop a rash, you are unable to swallow fluids, you are drooling, or you are having difficulty breathing.  Follow up with your primary care provider if your symptoms  are not improving in 3-4 days.   Drink plenty of liquids, especially water and take time to rest your body. This may mean taking a nap or going to bed earlier. Your body is fighting an infection and liquids and rest will improve the pace of recovery. Remember to regularly wash your hands and avoid close contact with others to prevent spreading your infection.   Diagnosis: Acute Sinusitis  Diagnosis ICD: J01.90  Additional Clinician Notes: Thank you.  I see the note about chronic sinusitis.  Current guidelines do not recommend antibiotics until 10 days of aggressive sinus draining.   I see you are using pseudoephedrine already.  I would add afrin nasal spray for 4 days (then stop to avoid rebound.)  It may make more sense to see your primary physician and put together a more personalized plan for earlier treatment with antibiotics than what would be provided with on-line care if appropriate.  I hope you feel better soon!

## 2017-06-29 ENCOUNTER — OFFICE VISIT (OUTPATIENT)
Dept: FAMILY MEDICINE | Facility: CLINIC | Age: 52
End: 2017-06-29
Payer: COMMERCIAL

## 2017-06-29 VITALS
WEIGHT: 132 LBS | OXYGEN SATURATION: 98 % | TEMPERATURE: 98.4 F | HEART RATE: 87 BPM | HEIGHT: 63 IN | RESPIRATION RATE: 16 BRPM | DIASTOLIC BLOOD PRESSURE: 82 MMHG | SYSTOLIC BLOOD PRESSURE: 124 MMHG | BODY MASS INDEX: 23.39 KG/M2

## 2017-06-29 DIAGNOSIS — J01.01 ACUTE RECURRENT MAXILLARY SINUSITIS: ICD-10-CM

## 2017-06-29 DIAGNOSIS — J30.89 PERENNIAL ALLERGIC RHINITIS: ICD-10-CM

## 2017-06-29 PROCEDURE — 99213 OFFICE O/P EST LOW 20 MIN: CPT | Performed by: NURSE PRACTITIONER

## 2017-06-29 RX ORDER — PREDNISONE 20 MG/1
40 TABLET ORAL DAILY
Qty: 10 TABLET | Refills: 0 | Status: SHIPPED | OUTPATIENT
Start: 2017-06-29 | End: 2017-07-04

## 2017-06-29 RX ORDER — CEFPROZIL 500 MG/1
500 TABLET, FILM COATED ORAL 2 TIMES DAILY
Qty: 20 TABLET | Refills: 0 | Status: SHIPPED | OUTPATIENT
Start: 2017-06-29 | End: 2018-05-24

## 2017-06-29 RX ORDER — FLUTICASONE PROPIONATE 50 MCG
SPRAY, SUSPENSION (ML) NASAL
Qty: 16 G | Refills: 3 | Status: SHIPPED | OUTPATIENT
Start: 2017-06-29 | End: 2019-02-11

## 2017-06-29 RX ORDER — SPIRONOLACTONE 50 MG/1
50 TABLET, FILM COATED ORAL 2 TIMES DAILY
Refills: 0 | COMMUNITY
Start: 2017-05-23 | End: 2020-06-29

## 2017-06-29 NOTE — PROGRESS NOTES
SUBJECTIVE:                                                    Mercedes Patel is a 52 year old female who presents to clinic today for the following health issues:      RESPIRATORY SYMPTOMS      Duration: 2 weeks     Description  nasal congestion, rhinorrhea, facial pain/pressure, cough, fever and headache    Severity: moderate    Accompanying signs and symptoms: None    History (predisposing factors):  none    Precipitating or alleviating factors: None    Therapies tried and outcome:  Sudafed, Mucinex, Ibuprofen, Theraflu, Afrin         Problem list and histories reviewed & adjusted, as indicated.    Additional history:   Illness started with cold symptoms. Now her cheeks hurt and nasal secretions are thick yellow. Tired.       Patient Active Problem List   Diagnosis     Nontoxic multinodular goiter     Leiomyoma of uterus     Sinusitis, chronic     CARDIOVASCULAR SCREENING; LDL GOAL LESS THAN 160     Perennial allergic rhinitis, unspecified allergic rhinitis trigger     Chronic right-sided low back pain with right-sided sciatica     Past Surgical History:   Procedure Laterality Date     ENDOSCOPY       HC CT MAXILLOFACIAL W/O CONTRAST  2006    chronic sinus disease     HC MRI BRAIN W/O CONTRAST  2004    MRI brain pos sinusitis     HC US SOFT TISSUE HEAD/NECK  2/08, 3/10    thyroid 3 nodules unchanged, f/b endocrine, s/p FNA #4 isthmus nodule benign 9/10     HYSTEROSCOPY,ABLATION ENDOMETRIUM  2008         SONO PELVIS COMPLETE  2/06, 3/08,12/11    fibroids     TONSILLECTOMY       US ABDOMEN LIMITED PORTABLE  2011    nl       Social History   Substance Use Topics     Smoking status: Never Smoker     Smokeless tobacco: Never Used     Alcohol use 0.0 oz/week     0 Standard drinks or equivalent per week      Comment: rarely 1/month     Family History   Problem Relation Age of Onset     Lipids Mother      Hypertension Mother      Hypertension Father      Lipids Father      Blood Disease Father      clotting  "d/o with factor V leiden-pt negative     CEREBROVASCULAR DISEASE Maternal Grandfather      Cancer - colorectal Maternal Grandfather          Current Outpatient Prescriptions   Medication Sig Dispense Refill     spironolactone (ALDACTONE) 50 MG tablet Take 50 mg by mouth 2 times daily  0     cefPROZIL (CEFZIL) 500 MG tablet Take 1 tablet (500 mg) by mouth 2 times daily 20 tablet 0     predniSONE (DELTASONE) 20 MG tablet Take 2 tablets (40 mg) by mouth daily for 5 days 10 tablet 0     fluticasone (FLONASE) 50 MCG/ACT spray SPRAY 2 SPRAYS INTO NOSTRIL DAILY 16 g 3     Omega-3 Fatty Acids (OMEGA-3 FISH OIL PO) Take by mouth daily       vitamin  B complex with vitamin C (STRESS TAB) TABS Take 1 tablet by mouth daily       Cholecalciferol (VITAMIN D) 1000 UNITS capsule Take 2 capsules by mouth daily        fexofenadine (ALLEGRA) 180 MG tablet Take 1 tablet by mouth daily. 90 tablet 1       Reviewed and updated as needed this visit by clinical staff       Reviewed and updated as needed this visit by Provider         ROS:  C: NEGATIVE for fever, chills, change in weight  E/M: NEGATIVE for ear, mouth and throat problems  R: NEGATIVE for significant cough or SOB  CV: NEGATIVE for chest pain, palpitations or peripheral edema    OBJECTIVE:                                                    /82 (Cuff Size: Adult Regular)  Pulse 87  Temp 98.4  F (36.9  C) (Tympanic)  Resp 16  Ht 5' 3\" (1.6 m)  Wt 132 lb (59.9 kg)  SpO2 98%  BMI 23.38 kg/m2  Body mass index is 23.38 kg/(m^2).   GENERAL: healthy, alert, well nourished, well hydrated, sounds congested  HENT: ear canals- normal; TMs- normal; Nose- congested.  Mouth- no ulcers, no lesions  SINUSES: tender bilateral maxilla   NECK: no tenderness, no adenopathy, no asymmetry, no masses, no stiffness    RESP: lungs clear to auscultation - no rales, no rhonchi, no wheezes  CV: regular rates and rhythm, normal S1 S2, no S3 or S4 and no murmur, no click or rub        "     ASSESSMENT/PLAN:                                                        ICD-10-CM    1. Acute recurrent maxillary sinusitis J01.01 cefPROZIL (CEFZIL) 500 MG tablet     predniSONE (DELTASONE) 20 MG tablet   2. Perennial allergic rhinitis J30.89 fluticasone (FLONASE) 50 MCG/ACT spray       Discussed condition and symptomatic cares  I have discussed with patient the risks, benefits, medications, treatment options and modalities.  Cefzil BID x 10 days  Prednisone x 5 days  Follow up if not improving as expected.      JOE Santillan [facility]

## 2017-06-29 NOTE — NURSING NOTE
"Chief Complaint   Patient presents with     URI     URI       Initial /82 (Cuff Size: Adult Regular)  Pulse 87  Temp 98.4  F (36.9  C) (Tympanic)  Resp 16  Ht 5' 3\" (1.6 m)  Wt 132 lb (59.9 kg)  SpO2 98%  BMI 23.38 kg/m2 Estimated body mass index is 23.38 kg/(m^2) as calculated from the following:    Height as of this encounter: 5' 3\" (1.6 m).    Weight as of this encounter: 132 lb (59.9 kg).  Medication Reconciliation: complete   Serina Wagner, CMA    "

## 2017-06-29 NOTE — MR AVS SNAPSHOT
"              After Visit Summary   6/29/2017    Mercedes Patel    MRN: [id number]           Patient Information     Date Of Birth          1965        Visit Information        Provider Department      6/29/2017 1:45 PM Zenaida Arriola APRN CNP [facility]        Today's Diagnoses     Acute recurrent maxillary sinusitis        Perennial allergic rhinitis           Follow-ups after your visit        Who to contact     If you have questions or need follow up information about today's clinic visit or your schedule please contact [facility] directly at [phone].  Normal or non-critical lab and imaging results will be communicated to you by [employer], letter or phone within 4 business days after the clinic has received the results. If you do not hear from us within 7 days, please contact the clinic through [employer] or phone. If you have a critical or abnormal lab result, we will notify you by phone as soon as possible.  Submit refill requests through Superbly or call your pharmacy and they will forward the refill request to us. Please allow 3 business days for your refill to be completed.          Additional Information About Your Visit        MyChart Information     Superbly gives you secure access to your electronic health record. If you see a primary care provider, you can also send messages to your care team and make appointments. If you have questions, please call your primary care clinic.  If you do not have a primary care provider, please call [phone] and they will assist you.        Care EveryWhere ID     This is your Care EveryWhere ID. This could be used by other organizations to access your [facility] medical records  [id number]        Your Vitals Were     Pulse Temperature Respirations Height Pulse Oximetry BMI (Body Mass Index)    87 98.4  F (36.9  C) (Tympanic) 16 5' 3\" (1.6 m) 98% 23.38 kg/m2       Blood Pressure from Last 3 Encounters:   06/29/17 124/82 "   02/27/17 122/80   02/09/17 131/87    Weight from Last 3 Encounters:   06/29/17 132 lb (59.9 kg)   02/27/17 133 lb (60.3 kg)   02/09/17 135 lb (61.2 kg)              Today, you had the following     No orders found for display         Today's Medication Changes          These changes are accurate as of: 6/29/17  7:28 PM.  If you have any questions, ask your nurse or doctor.               Start taking these medicines.        Dose/Directions    cefPROZIL 500 MG tablet   Commonly known as:  CEFZIL   Used for:  Acute recurrent maxillary sinusitis   Started by:  Zenaida Arriola APRN CNP        Dose:  500 mg   Take 1 tablet (500 mg) by mouth 2 times daily   Quantity:  20 tablet   Refills:  0       predniSONE 20 MG tablet   Commonly known as:  DELTASONE   Used for:  Acute recurrent maxillary sinusitis   Started by:  Zenaida Arriola APRN CNP        Dose:  40 mg   Take 2 tablets (40 mg) by mouth daily for 5 days   Quantity:  10 tablet   Refills:  0         These medicines have changed or have updated prescriptions.        Dose/Directions    fluticasone 50 MCG/ACT spray   Commonly known as:  FLONASE   This may have changed:  See the new instructions.   Used for:  Perennial allergic rhinitis   Changed by:  Zenaida Arriola APRN CNP        SPRAY 2 SPRAYS INTO NOSTRIL DAILY   Quantity:  16 g   Refills:  3            Where to get your medicines      These medications were [place] [facility] [place] [address] [place] [address]     Phone:  [phone]     cefPROZIL 500 MG tablet    fluticasone 50 MCG/ACT spray    predniSONE 20 MG tablet                Primary Care Provider Office Phone # Fax #    JOE Santillan [phone]       [address]  [place] [address]        Equal Access to Services     MARISABEL BARKER AH: Jessi Boyd, deangelo uriarteadaha, qaybalberto kendrick  chase krishnanaan ah. So [facility] [phone].    ATENCIÓN: Si basilio williamson, tiene a chanel disposición servicios gratuitos de asistencia lingüística. Radha rees [phone].    We comply with applicable federal civil rights laws and [place] laws. We do not discriminate on the basis of race, color, national origin, age, disability sex, sexual orientation or gender identity.            Thank you!     Thank you for choosing [facility]  for your care. Our goal is always to provide you with excellent care. Hearing back from our patients is one way we can continue to improve our services. Please take a few minutes to complete the written survey that you may receive in the mail after your visit with us. Thank you!             Your Updated Medication List - Protect others around you: Learn how to safely use, store and throw away your medicines at www.disposemymeds.org.          This list is accurate as of: 6/29/17  7:28 PM.  Always use your most recent med list.                   Brand Name Dispense Instructions for use Diagnosis    cefPROZIL 500 MG tablet    CEFZIL    20 tablet    Take 1 tablet (500 mg) by mouth 2 times daily    Acute recurrent maxillary sinusitis       fexofenadine 180 MG tablet    ALLEGRA    90 tablet    Take 1 tablet by mouth daily.    Allergic rhinitis, cause unspecified       fluticasone 50 MCG/ACT spray    FLONASE    16 g    SPRAY 2 SPRAYS INTO NOSTRIL DAILY    Perennial allergic rhinitis       OMEGA-3 FISH OIL PO      Take by mouth daily        predniSONE 20 MG tablet    DELTASONE    10 tablet    Take 2 tablets (40 mg) by mouth daily for 5 days    Acute recurrent maxillary sinusitis       spironolactone 50 MG tablet    ALDACTONE     Take 50 mg by mouth 2 times daily        vitamin B complex with vitamin C Tabs tablet      Take 1 tablet by mouth daily        vitamin D 1000 UNITS capsule      Take 2 capsules by mouth daily

## 2017-07-15 ENCOUNTER — HEALTH MAINTENANCE LETTER (OUTPATIENT)
Age: 52
End: 2017-07-15

## 2017-09-18 ENCOUNTER — OFFICE VISIT (OUTPATIENT)
Dept: ORTHOPEDICS | Facility: CLINIC | Age: 52
End: 2017-09-18
Payer: COMMERCIAL

## 2017-09-18 ENCOUNTER — RADIANT APPOINTMENT (OUTPATIENT)
Dept: GENERAL RADIOLOGY | Facility: CLINIC | Age: 52
End: 2017-09-18
Attending: FAMILY MEDICINE
Payer: COMMERCIAL

## 2017-09-18 VITALS
BODY MASS INDEX: 23.39 KG/M2 | HEIGHT: 63 IN | WEIGHT: 132 LBS | SYSTOLIC BLOOD PRESSURE: 116 MMHG | DIASTOLIC BLOOD PRESSURE: 82 MMHG

## 2017-09-18 DIAGNOSIS — M54.41 ACUTE RIGHT-SIDED LOW BACK PAIN WITH RIGHT-SIDED SCIATICA: ICD-10-CM

## 2017-09-18 DIAGNOSIS — M54.41 ACUTE RIGHT-SIDED LOW BACK PAIN WITH RIGHT-SIDED SCIATICA: Primary | ICD-10-CM

## 2017-09-18 PROCEDURE — 99203 OFFICE O/P NEW LOW 30 MIN: CPT | Performed by: FAMILY MEDICINE

## 2017-09-18 PROCEDURE — 72100 X-RAY EXAM L-S SPINE 2/3 VWS: CPT

## 2017-09-18 RX ORDER — METHYLPREDNISOLONE 4 MG
TABLET, DOSE PACK ORAL
Qty: 21 TABLET | Refills: 0 | Status: SHIPPED | OUTPATIENT
Start: 2017-09-18 | End: 2018-05-24

## 2017-09-18 ASSESSMENT — ENCOUNTER SYMPTOMS
TINGLING: 0
SENSORY CHANGE: 1
BACK PAIN: 1
MYALGIAS: 1
FOCAL WEAKNESS: 0

## 2017-09-18 NOTE — MR AVS SNAPSHOT
After Visit Summary   9/18/2017    Mercedes Patel    MRN: [id number]           Patient Information     Date Of Birth          1965        Visit Information        Provider Department      9/18/2017 1:20 PM Quirino Cosme MD [facility]        Today's Diagnoses     Acute right-sided low back pain with right-sided sciatica    -  1       Follow-ups after your visit        Additional Services     [facility] PT, HAND, AND CHIROPRACTIC REFERRAL       **This order will print in the [facility] Scheduling Office**    Physical Therapy, Hand Therapy and Chiropractic Care are available through:    *[facility]  *[facility]  *[facility]    Call one number to schedule at any of the above locations: [phone].    Your provider has referred you to: Physical Therapy at [facility] or [facility]    Indication/Reason for Referral: Acute right-sided low back pain with right-sided sciatica    Onset of Illness: acute, recurent LBP  Therapy Orders: Evaluate and Treat  Special Programs: None  Special Request: None    Dave Acosta      Additional Comments for the Therapist or Chiropractor:     Please be aware that coverage of these services is subject to the terms and limitations of your health insurance plan.  Call member services at your health plan with any benefit or coverage questions.      Please bring the following to your appointment:    *Your personal calendar for scheduling future appointments  *Comfortable clothing                  Who to contact     If you have questions or need follow up information about today's clinic visit or your schedule please contact [facility] directly at [phone].  Normal or non-critical lab and imaging results will be communicated to you by MyChart, letter or phone within 4 business days after the clinic has received the results. If you do  "not hear from us within 7 days, please contact the clinic through deltaDNA or phone. If you have a critical or abnormal lab result, we will notify you by phone as soon as possible.  Submit refill requests through deltaDNA or call your pharmacy and they will forward the refill request to us. Please allow 3 business days for your refill to be completed.          Additional Information About Your Visit        [employer] Information     deltaDNA gives you secure access to your electronic health record. If you see a primary care provider, you can also send messages to your care team and make appointments. If you have questions, please call your primary care clinic.  If you do not have a primary care provider, please call [phone] and they will assist you.        Care EveryWhere ID     This is your Care EveryWhere ID. This could be used by other organizations to access your [facility] medical records  [id number]        Your Vitals Were     Height BMI (Body Mass Index)                5' 3\" (1.6 m) 23.38 kg/m2           Blood Pressure from Last 3 Encounters:   09/18/17 116/82   06/29/17 124/82   02/27/17 122/80    Weight from Last 3 Encounters:   09/18/17 132 lb (59.9 kg)   06/29/17 132 lb (59.9 kg)   02/27/17 133 lb (60.3 kg)              We Performed the Following     [facility] PT, HAND, AND CHIROPRACTIC REFERRAL          Today's Medication Changes          These changes are accurate as of: 9/18/17  3:57 PM.  If you have any questions, ask your nurse or doctor.               Start taking these medicines.        Dose/Directions    methylPREDNISolone 4 MG tablet   Commonly known as:  MEDROL DOSEPAK   Used for:  Acute right-sided low back pain with right-sided sciatica   Started by:  Quirino Cosme MD        Follow package instructions   Quantity:  21 tablet   Refills:  0            Where to get your medicines      These medications were sent to [facility] [place] - [address]  " [address] [facility], [place] [facility] [address]     Phone:  [phone]     methylPREDNISolone 4 MG tablet                Primary Care Provider Office Phone # Fax #    JOE Santillan [phone]       XX RETIRED XX  [facility] [address]        Equal Access to Services     MARISABEL BARKER : Hadii aad ku hadasho Soomaali, waaxda luqadaha, qaybta kaalmada adeegyada, waxay idiin hayaan adeeg kharash la'aan . So [facility] [phone].    ATENCIÓN: Si habla español, tiene a chanel disposición servicios gratuitos de asistencia lingüística. [facility] [phone].    We comply with applicable federal civil rights laws and [place] laws. We do not discriminate on the basis of race, color, national origin, age, disability sex, sexual orientation or gender identity.            Thank you!     Thank you for choosing [facility]  for your [facility]. Our goal is always to provide you with excellent care. Hearing back from our patients is one way we can continue to improve our services. Please take a few minutes to complete the written survey that you may receive in the mail after your visit with us. Thank you!             Your Updated Medication List - Protect others around you: Learn how to safely use, store and throw away your medicines at www.disposemymeds.org.          This list is accurate as of: 9/18/17  3:57 PM.  Always use your most recent med list.                   Brand Name Dispense Instructions for use Diagnosis    cefPROZIL 500 MG tablet    CEFZIL    20 tablet    Take 1 tablet (500 mg) by mouth 2 times daily    Acute recurrent maxillary sinusitis       fexofenadine 180 MG tablet    ALLEGRA    90 tablet    Take 1 tablet by mouth daily.    Allergic rhinitis, cause unspecified       fluticasone 50 MCG/ACT spray    FLONASE    16 g    SPRAY 2 SPRAYS INTO NOSTRIL DAILY    Perennial allergic rhinitis       methylPREDNISolone 4 MG tablet    MEDROL DOSEPAK    21 tablet     Follow package instructions    Acute right-sided low back pain with right-sided sciatica       OMEGA-3 FISH OIL PO      Take by mouth daily        spironolactone 50 MG tablet    ALDACTONE     Take 50 mg by mouth 2 times daily        vitamin B complex with vitamin C Tabs tablet      Take 1 tablet by mouth daily        vitamin D 1000 UNITS capsule      Take 2 capsules by mouth daily

## 2017-09-18 NOTE — NURSING NOTE
"Chief Complaint   Patient presents with     Back Pain       Initial /82  Ht 5' 3\" (1.6 m)  Wt 132 lb (59.9 kg)  BMI 23.38 kg/m2 Estimated body mass index is 23.38 kg/(m^2) as calculated from the following:    Height as of this encounter: 5' 3\" (1.6 m).    Weight as of this encounter: 132 lb (59.9 kg).  Medication Reconciliation: complete    "

## 2017-09-18 NOTE — PROGRESS NOTES
HPI   [facility]   Clinic Visit s Sep 18, 2017    PCP: Zenaida Arriola      Mercedes is a 52 year old female who is seen in consultation at the request of Dr. Arriola for   Chief Complaint   Patient presents with     Back Pain       Injury: Reports insidious onset without acute precipitating event.    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       Past Medical History:   Diagnosis Date     Acne      Allergic rhinitis      Chronic fatigue      Constipation      Disorders of bursae and tendons in shoulder region, unspecified     Lt shoulder s/p steroid injection 7/02     GERD (gastroesophageal reflux disease) 4/09     Headache(784.0)     with recurrent sinusitis and allergies,Dr. Traore ENT      Insomnia      Leiomyoma of uterus, unspecified 2/06    2 small fibroids noted on pelvic us     Menorrhagia 2008    tx'd with ablation     Nausea     weight loss / endoscopy     Nontoxic multinodular goiter     followed by Dr. Raymundo.  Biopsy nl     Other acne     derm Dr. Ventura, started spironolactone 3/08     Perennial allergic rhinitis 2006    gets allergy injections     Unspecified sinusitis (chronic)     takes probiotic- [employer] FORM chronic, recurrent sinusitis, sinus disease noted on ct, mri       Patient Active Problem List    Diagnosis Date Noted     Perennial allergic rhinitis, unspecified allergic rhinitis trigger 02/27/2017     Priority: Medium     Chronic right-sided low  back pain with right-sided sciatica 02/27/2017     Priority: Medium     CARDIOVASCULAR SCREENING; LDL GOAL LESS THAN 160 11/30/2011     Priority: Medium     Leiomyoma of uterus      Priority: Medium     2 small fibroids noted on pelvic us  Problem list name updated by automated process. Provider to review       Sinusitis, chronic      Priority: Medium     chronic, recurrent sinusitis, sinus disease noted on ct, mri  Problem list name updated by automated process. Provider to review       Nontoxic multinodular goiter 04/21/2004     Priority: Medium     followed by Dr. 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      Social History Main Topics     Smoking status: Never Smoker     Smokeless tobacco: Never Used     Alcohol use 0.0 oz/week     0 Standard drinks or equivalent per week      Comment: rarely 1/month     Drug use: No       Past Surgical History:   Procedure Laterality Date     ENDOSCOPY       HC CT MAXILLOFACIAL W/O CONTRAST  2006    chronic sinus disease     HC MRI BRAIN W/O CONTRAST  2004    MRI brain pos sinusitis     HC US SOFT TISSUE HEAD/NECK  2/08, 3/10    thyroid 3 nodules unchanged, f/b endocrine, s/p FNA #4 isthmus nodule benign 9/10     HYSTEROSCOPY,ABLATION ENDOMETRIUM  2008    Dr.MacKay ABARCA PELVIS COMPLETE  2/06, 3/08,12/11    fibroids     TONSILLECTOMY       US ABDOMEN LIMITED PORTABLE  2011    nl         Review of Systems   Musculoskeletal: Positive for back pain and myalgias.  "  Neurological: Positive for sensory change. Negative for tingling and focal weakness.         Physical Exam  /82  Ht 5' 3\" (1.6 m)  Wt 132 lb (59.9 kg)  BMI 23.38 kg/m2  Constitutional:well-developed, well-nourished, and in no distress.   Cardiovascular: Intact distal pulses.    Neurological: alert. Gait Normal:   Gait, station, stance, and balance appear normal for age  Skin: Skin is warm and dry.   Psychiatric: Mood and affect normal.   Respiratory: unlabored, speaks in full sentences  Lymph: no LAD, no lymphangitis    Back Exam   Sensation: Decreased.  Gait: Normal.    Tenderness   The patient is experiencing tenderness in the lumbar, sacroiliac tenderness.    Range of Motion   Flexion:                    Normal  Extension:                Abnormal  Lateral Bend Left:    Abnormal  Lateral Bend Right:  Normal  Rotation Right:         Normal  Rotation Left:           Normal  SLR    Right: Negative  Left:    Negative    Muscle Strength   Normal back strength    Tests   Toe Walk: Normal  Heel Walk: Normal    Reflexes   Patellar:  Normal  Achilles:  Normal    Comments:  Tenderness to palpation  Is on RIGHT side only    Some tightness with extension and LEFT lateral bending, ROM is normal            X-ray images Ordered and independently reviewed by me in the office today with the patient. X-ray shows: on my review there is L5-S1 disc space narrowing with anterior osteophytes  Recent Results (from the past 48 hour(s))   XR Lumbar Spine 2/3 Views    Narrative    LUMBAR SPINE TWO TO THREE VIEWS 9/18/2017 1:40 PM     HISTORY: Lumbago with sciatica, right side.      Impression    IMPRESSION: 0.9 cm calcification adjacent to the left L3 transverse  process. This is of indeterminate significance but could represent a  renal or proximal ureteral stone. Degenerative disc disease is noted  in the mid and lower lumbar spine. Lumbar curvature or scoliosis is  noted, apex to the right at L2-L3. Degenerative changes are " present at  the pubis symphysis.    SANTI PULIDO MD       ASSESSMENT/PLAN    ICD-10-CM    1. Acute right-sided low back pain with right-sided sciatica M54.41 XR Lumbar Spine 2/3 Views     methylPREDNISolone (MEDROL DOSEPAK) 4 MG tablet     [facility] PT, HAND, AND CHIROPRACTIC REFERRAL     Radicualr pain pattern with possible neuropathy on plantar foot, suggesting possible L5 impairment.     Given improvement with PHYSICAL THERAPY last year, I recommend returning to that for now, and also adding a short course of oral steroids to see if things can be calmed down. If not improved in 2-3 weeks, proceed with MRI. Pt ok with plan.

## 2017-09-29 ENCOUNTER — THERAPY VISIT (OUTPATIENT)
Dept: PHYSICAL THERAPY | Facility: CLINIC | Age: 52
End: 2017-09-29
Payer: COMMERCIAL

## 2017-09-29 DIAGNOSIS — M54.41 RIGHT-SIDED LOW BACK PAIN WITH RIGHT-SIDED SCIATICA: Primary | ICD-10-CM

## 2017-09-29 PROCEDURE — 97110 THERAPEUTIC EXERCISES: CPT | Mod: GP | Performed by: PHYSICAL THERAPIST

## 2017-09-29 PROCEDURE — 97161 PT EVAL LOW COMPLEX 20 MIN: CPT | Mod: GP | Performed by: PHYSICAL THERAPIST

## 2017-09-29 NOTE — PROGRESS NOTES
Subjective:    Patient is a 52 year old female presenting with rehab back hpi. The history is provided by the patient.   Mercedes Patel is a 52 year old female with a lumbar condition.      This is a recurrent condition  Current low back pain began 2 weeks ago when patient woke up with severe right low back pain.  She currently complains of right low back pain, lateral right hip pain and right foot pain.  Symptoms are worse with sitting and prolonged standing, and better with walking..    Patient reports pain:  Lumbar spine right.  Radiates to:  Foot right (lateral hip R).  Pain is described as aching and sharp  and reported as 5/10 and 1/10.  Associated symptoms:  Tingling and numbness. Pain is worse in the A.M..  Symptoms are exacerbated by bending, sitting and standing and relieved by activity/movement.  Since onset symptoms are unchanged.  Special tests:  X-ray.  Previous treatment includes physical therapy.  There was moderate improvement following previous treatment.  General health as reported by patient is good.                                              Objective:    Standing Alignment:        Lumbar:  Normal                           Lumbar/SI Evaluation  ROM:    AROM Lumbar:   Flexion:          Fingertips to mid distal LE with right LBP with return, worse after reps.  Right lbp with RFIL  Ext:                    Mild loss with R LBP, PREET centralizes lbp.   Press ups no complaint   Side Bend:        Left:     Right:   Rotation:           Left:     Right:   Side Glide:        Left:  Min R LBP    Right:  Min R LBP          Lumbar Myotomes:  normal            Lumbar DTR's:  normal          Neural Tension/Mobility:      Right side:   Slump positive.  Right side:   SLR w/DF  negative.   Lumbar Palpation:      Tenderness present at Right: Vertebral                                                 Tender L 4 and L 5 with PA glides    General   Glut med strength 4/5 bilaterally.    Tender R glut med/min and  greater trochanter    Different symptom with Fadir  ROS    Assessment/Plan:      Patient is a 52 year old female with lumbar complaints.    Patient has the following significant findings with corresponding treatment plan.                Diagnosis 1:  Recurring low back pain with right radicular symptoms  Pain -  self management, education, directional preference exercise and home program  Decreased ROM/flexibility - manual therapy and therapeutic exercise  Decreased strength - therapeutic exercise and therapeutic activities  Decreased function - therapeutic activities  Impaired posture - neuro re-education    Therapy Evaluation Codes:   1) History comprised of:   Personal factors that impact the plan of care:      None.    Comorbidity factors that impact the plan of care are:      None.     Medications impacting care: None.  2) Examination of Body Systems comprised of:   Body structures and functions that impact the plan of care:      Lumbar spine.   Activity limitations that impact the plan of care are:      Sitting and Standing.  3) Clinical presentation characteristics are:   Stable/Uncomplicated.  4) Decision-Making    Low complexity using standardized patient assessment instrument and/or measureable assessment of functional outcome.  Cumulative Therapy Evaluation is: Low complexity.    Previous and current functional limitations:  (See Goal Flow Sheet for this information)    Short term and Long term goals: (See Goal Flow Sheet for this information)     Communication ability:  Patient appears to be able to clearly communicate and understand verbal and written communication and follow directions correctly.  Treatment Explanation - The following has been discussed with the patient:   RX ordered/plan of care  Anticipated outcomes  Possible risks and side effects  This patient would benefit from PT intervention to resume normal activities.   Rehab potential is good.    Frequency:  1 X week, once daily  Duration:   for 6-10 visits  Discharge Plan:  Achieve all LTG.  Independent in home treatment program.  Reach maximal therapeutic benefit.    Please refer to the daily flowsheet for treatment today, total treatment time and time spent performing 1:1 timed codes.

## 2017-09-29 NOTE — PROGRESS NOTES
Subjective:    Patient is a 52 year old female presenting with rehab left ankle/foot hpi.                                      Pertinent medical history includes:  Osteoarthritis, thyroid problems and migraines.  Medical allergies: yes (PCN, Sulfa, Erythromycin).      Current occupation is RN.    Primary job tasks include:  Prolonged standing, lifting and other (pushing/pulling).                                Objective:    System    Physical Exam    General     ROS    Assessment/Plan:

## 2017-10-02 ENCOUNTER — TRANSFERRED RECORDS (OUTPATIENT)
Dept: HEALTH INFORMATION MANAGEMENT | Facility: CLINIC | Age: 52
End: 2017-10-02

## 2017-10-06 ENCOUNTER — THERAPY VISIT (OUTPATIENT)
Dept: PHYSICAL THERAPY | Facility: CLINIC | Age: 52
End: 2017-10-06
Payer: COMMERCIAL

## 2017-10-06 DIAGNOSIS — M54.41 CHRONIC RIGHT-SIDED LOW BACK PAIN WITH RIGHT-SIDED SCIATICA: Primary | ICD-10-CM

## 2017-10-06 DIAGNOSIS — G89.29 CHRONIC RIGHT-SIDED LOW BACK PAIN WITH RIGHT-SIDED SCIATICA: Primary | ICD-10-CM

## 2017-10-06 PROCEDURE — 97140 MANUAL THERAPY 1/> REGIONS: CPT | Mod: GP | Performed by: PHYSICAL THERAPIST

## 2017-10-06 PROCEDURE — 97110 THERAPEUTIC EXERCISES: CPT | Mod: GP | Performed by: PHYSICAL THERAPIST

## 2017-10-12 ENCOUNTER — THERAPY VISIT (OUTPATIENT)
Dept: PHYSICAL THERAPY | Facility: CLINIC | Age: 52
End: 2017-10-12
Payer: COMMERCIAL

## 2017-10-12 DIAGNOSIS — G89.29 CHRONIC RIGHT-SIDED LOW BACK PAIN WITH RIGHT-SIDED SCIATICA: Primary | ICD-10-CM

## 2017-10-12 DIAGNOSIS — M54.41 CHRONIC RIGHT-SIDED LOW BACK PAIN WITH RIGHT-SIDED SCIATICA: Primary | ICD-10-CM

## 2017-10-12 PROCEDURE — 97110 THERAPEUTIC EXERCISES: CPT | Mod: GP | Performed by: PHYSICAL THERAPIST

## 2017-10-12 PROCEDURE — 97140 MANUAL THERAPY 1/> REGIONS: CPT | Mod: GP | Performed by: PHYSICAL THERAPIST

## 2017-10-12 PROCEDURE — 97112 NEUROMUSCULAR REEDUCATION: CPT | Mod: GP | Performed by: PHYSICAL THERAPIST

## 2017-10-26 ENCOUNTER — THERAPY VISIT (OUTPATIENT)
Dept: PHYSICAL THERAPY | Facility: CLINIC | Age: 52
End: 2017-10-26
Payer: COMMERCIAL

## 2017-10-26 DIAGNOSIS — M54.41 CHRONIC RIGHT-SIDED LOW BACK PAIN WITH RIGHT-SIDED SCIATICA: Primary | ICD-10-CM

## 2017-10-26 DIAGNOSIS — G89.29 CHRONIC RIGHT-SIDED LOW BACK PAIN WITH RIGHT-SIDED SCIATICA: Primary | ICD-10-CM

## 2017-10-26 PROCEDURE — 97140 MANUAL THERAPY 1/> REGIONS: CPT | Mod: GP | Performed by: PHYSICAL THERAPIST

## 2017-10-26 PROCEDURE — 97110 THERAPEUTIC EXERCISES: CPT | Mod: GP | Performed by: PHYSICAL THERAPIST

## 2017-10-26 PROCEDURE — 97112 NEUROMUSCULAR REEDUCATION: CPT | Mod: GP | Performed by: PHYSICAL THERAPIST

## 2017-11-02 ENCOUNTER — THERAPY VISIT (OUTPATIENT)
Dept: PHYSICAL THERAPY | Facility: CLINIC | Age: 52
End: 2017-11-02
Payer: COMMERCIAL

## 2017-11-02 DIAGNOSIS — M54.41 CHRONIC RIGHT-SIDED LOW BACK PAIN WITH RIGHT-SIDED SCIATICA: Primary | ICD-10-CM

## 2017-11-02 DIAGNOSIS — G89.29 CHRONIC RIGHT-SIDED LOW BACK PAIN WITH RIGHT-SIDED SCIATICA: Primary | ICD-10-CM

## 2017-11-02 PROCEDURE — 97110 THERAPEUTIC EXERCISES: CPT | Mod: GP | Performed by: PHYSICAL THERAPIST

## 2017-11-02 PROCEDURE — 97140 MANUAL THERAPY 1/> REGIONS: CPT | Mod: GP | Performed by: PHYSICAL THERAPIST

## 2017-11-02 PROCEDURE — 97112 NEUROMUSCULAR REEDUCATION: CPT | Mod: GP | Performed by: PHYSICAL THERAPIST

## 2017-11-10 ENCOUNTER — THERAPY VISIT (OUTPATIENT)
Dept: PHYSICAL THERAPY | Facility: CLINIC | Age: 52
End: 2017-11-10
Payer: COMMERCIAL

## 2017-11-10 DIAGNOSIS — M54.41 CHRONIC RIGHT-SIDED LOW BACK PAIN WITH RIGHT-SIDED SCIATICA: Primary | ICD-10-CM

## 2017-11-10 DIAGNOSIS — G89.29 CHRONIC RIGHT-SIDED LOW BACK PAIN WITH RIGHT-SIDED SCIATICA: Primary | ICD-10-CM

## 2017-11-10 PROCEDURE — 97110 THERAPEUTIC EXERCISES: CPT | Mod: GP | Performed by: PHYSICAL THERAPIST

## 2017-11-10 PROCEDURE — 97112 NEUROMUSCULAR REEDUCATION: CPT | Mod: GP | Performed by: PHYSICAL THERAPIST

## 2017-11-10 PROCEDURE — 97140 MANUAL THERAPY 1/> REGIONS: CPT | Mod: GP | Performed by: PHYSICAL THERAPIST

## 2017-11-17 ENCOUNTER — THERAPY VISIT (OUTPATIENT)
Dept: PHYSICAL THERAPY | Facility: CLINIC | Age: 52
End: 2017-11-17
Payer: COMMERCIAL

## 2017-11-17 DIAGNOSIS — M54.41 CHRONIC RIGHT-SIDED LOW BACK PAIN WITH RIGHT-SIDED SCIATICA: Primary | ICD-10-CM

## 2017-11-17 DIAGNOSIS — G89.29 CHRONIC RIGHT-SIDED LOW BACK PAIN WITH RIGHT-SIDED SCIATICA: Primary | ICD-10-CM

## 2017-11-17 PROCEDURE — 97112 NEUROMUSCULAR REEDUCATION: CPT | Mod: GP | Performed by: PHYSICAL THERAPIST

## 2017-11-17 PROCEDURE — 97110 THERAPEUTIC EXERCISES: CPT | Mod: GP | Performed by: PHYSICAL THERAPIST

## 2017-12-01 ENCOUNTER — THERAPY VISIT (OUTPATIENT)
Dept: PHYSICAL THERAPY | Facility: CLINIC | Age: 52
End: 2017-12-01
Payer: COMMERCIAL

## 2017-12-01 DIAGNOSIS — M54.41 CHRONIC RIGHT-SIDED LOW BACK PAIN WITH RIGHT-SIDED SCIATICA: Primary | ICD-10-CM

## 2017-12-01 DIAGNOSIS — G89.29 CHRONIC RIGHT-SIDED LOW BACK PAIN WITH RIGHT-SIDED SCIATICA: Primary | ICD-10-CM

## 2017-12-01 PROCEDURE — 97110 THERAPEUTIC EXERCISES: CPT | Mod: GP | Performed by: PHYSICAL THERAPIST

## 2017-12-01 PROCEDURE — 97112 NEUROMUSCULAR REEDUCATION: CPT | Mod: GP | Performed by: PHYSICAL THERAPIST

## 2017-12-08 ENCOUNTER — TELEPHONE (OUTPATIENT)
Dept: ORTHOPEDICS | Facility: CLINIC | Age: 52
End: 2017-12-08

## 2017-12-08 ENCOUNTER — THERAPY VISIT (OUTPATIENT)
Dept: PHYSICAL THERAPY | Facility: CLINIC | Age: 52
End: 2017-12-08
Payer: COMMERCIAL

## 2017-12-08 DIAGNOSIS — G89.29 CHRONIC RIGHT-SIDED LOW BACK PAIN WITH RIGHT-SIDED SCIATICA: Primary | ICD-10-CM

## 2017-12-08 DIAGNOSIS — M54.41 CHRONIC RIGHT-SIDED LOW BACK PAIN WITH RIGHT-SIDED SCIATICA: Primary | ICD-10-CM

## 2017-12-08 PROCEDURE — 97110 THERAPEUTIC EXERCISES: CPT | Mod: GP | Performed by: PHYSICAL THERAPIST

## 2017-12-08 PROCEDURE — 97112 NEUROMUSCULAR REEDUCATION: CPT | Mod: GP | Performed by: PHYSICAL THERAPIST

## 2017-12-08 NOTE — TELEPHONE ENCOUNTER
Patient calls asking for copies of her lumbar xray done 9/18/17 by Dr. Cosme. She is seeing a spine specialist 1/2/18 and would like a copy on a disk to take to her appointment.     Will have EP staff get back with her on 12/11/17.     Please assist patient with this.     LALY Kim RN

## 2018-03-16 ENCOUNTER — TELEPHONE (OUTPATIENT)
Dept: FAMILY MEDICINE | Facility: CLINIC | Age: 53
End: 2018-03-16

## 2018-03-16 NOTE — TELEPHONE ENCOUNTER
3/16/2018    Attempt 1    Contacted patient in regards to scheduling VIP mammogram  Message on voicemail     Patient is also due for - Preventive Health Screening Cervical/PAP    Comments: none      Outreach   Nedra Bansal

## 2018-03-21 NOTE — TELEPHONE ENCOUNTER
3/21/2018    Call Regarding VIP Mammogram    Attempt     Message on voicemail     Comments:     Other screenings that are due: Pap    Outreach   SV

## 2018-04-04 NOTE — TELEPHONE ENCOUNTER
4/4/2018    Attempt 3    Contacted patient in regards to scheduling VIP mammogram  Message on voicemail     Patient is also due for - Preventive Health Screening Cervical/PAP    Comments:       Outreach   CC

## 2018-04-20 ENCOUNTER — HOSPITAL ENCOUNTER (EMERGENCY)
Facility: CLINIC | Age: 53
Discharge: HOME | End: 2018-04-20
Attending: EMERGENCY MEDICINE | Admitting: EMERGENCY MEDICINE
Payer: OTHER MISCELLANEOUS

## 2018-04-20 VITALS
HEART RATE: 96 BPM | DIASTOLIC BLOOD PRESSURE: 107 MMHG | BODY MASS INDEX: 23.92 KG/M2 | TEMPERATURE: 97.9 F | WEIGHT: 135 LBS | OXYGEN SATURATION: 100 % | SYSTOLIC BLOOD PRESSURE: 166 MMHG | HEIGHT: 63 IN | RESPIRATION RATE: 18 BRPM

## 2018-04-20 DIAGNOSIS — S33.5XXA LUMBAR SPRAIN, INITIAL ENCOUNTER: ICD-10-CM

## 2018-04-20 DIAGNOSIS — S16.1XXA STRAIN OF NECK MUSCLE, INITIAL ENCOUNTER: ICD-10-CM

## 2018-04-20 PROCEDURE — 99283 EMERGENCY DEPT VISIT LOW MDM: CPT

## 2018-04-20 PROCEDURE — 25000132 ZZH RX MED GY IP 250 OP 250 PS 637: Performed by: EMERGENCY MEDICINE

## 2018-04-20 RX ORDER — IBUPROFEN 600 MG/1
600 TABLET, FILM COATED ORAL ONCE
Status: COMPLETED | OUTPATIENT
Start: 2018-04-20 | End: 2018-04-20

## 2018-04-20 RX ADMIN — IBUPROFEN 600 MG: 600 TABLET ORAL at 10:46

## 2018-04-20 ASSESSMENT — ENCOUNTER SYMPTOMS
NECK PAIN: 1
NECK STIFFNESS: 1
BACK PAIN: 1
LIGHT-HEADEDNESS: 0

## 2018-04-20 NOTE — ED AVS SNAPSHOT
Emergency Department    [address] [place] [address]    Phone:  [phone]    Fax:  [phone]                                       Mercedes Patel   MRN: [id number]    Department:   Emergency Department   Date of Visit:  4/20/2018           After Visit Summary Signature Page     I have received my discharge instructions, and my questions have been answered. I have discussed any challenges I see with this plan with the nurse or doctor.    ..........................................................................................................................................  Patient/Patient Representative Signature      ..........................................................................................................................................  Patient Representative Print Name and Relationship to Patient    ..................................................               ................................................  Date                                            Time    ..........................................................................................................................................  Reviewed by Signature/Title    ...................................................              ..............................................  Date                                                            Time

## 2018-04-20 NOTE — ED AVS SNAPSHOT
Emergency Department    [address] [facility] [address]    Phone:  [phone]    Fax:  [phone]                                       Mercedes Patel   MRN: [id number]    Department:   Emergency Department   Date of Visit:  4/20/2018           Patient Information     Date Of Birth          1965        Your diagnoses for this visit were:     Strain of neck muscle, initial encounter     Lumbar sprain, initial encounter        You were seen by Mary Anne Ronquillo MD.      Follow-up Information     Follow up with [facility]. Schedule an appointment as soon as possible for a visit in 1 week.    Contact information:    [address] [address] [phone]        Discharge Instructions       *You may resume diet and activities.  *Take medications as prescribed.  Ibuprofen and/or tylenol for pain. Continue your current medications.  *Follow-up with your orthopedics doctor in the next 2-3 days for reevaluation and ongoing physical therapy or prescriptions as needed.  *Return if you develop numbness, weakness, bowel or bladder incontinence, faint or feel like you will faint or become worse in any way.    Discharge Instructions  Neck Strain    You have been seen today for a neck sprain or strain.  Neck strains usually result from an injury to the neck. Car accidents, contact sports, and falls are common causes of neck strain. Sometimes your neck can start to hurt because of increased activity, muscle tension, an abnormal sleeping position, or because of other problems like arthritis in the neck.     Neck pain usually comes from injured muscles and ligaments. Sometimes there is a herniated ( slipped ) disc. We do not usually do MRI scans to look for these right away, since most herniated discs will get better on their own with time. Today, we did not find any evidence that your neck pain was caused by a serious or dangerous condition. However, sometimes symptoms  develop over time and cannot be found during an emergency visit, so it is very important that you follow up with your primary provider.    Generally, every Emergency Department visit should have a follow-up clinic visit with either a primary or a specialty clinic/provider. Please follow-up as instructed by your emergency provider today.    Return to the Emergency Department if:    You have increasing pain in your neck.    You develop difficulty swallowing or breathing.    You have numbness, weakness, or trouble moving your arms or legs.    You have severe dizziness and difficulty walking.    You are unable to control your bladder or bowels.    You develop severe headache or ringing in the ears.    What can I do to help myself at home?    If you had an injury, use cold for the first 1-2 days. Cold helps relieve pain and reduce inflammation.  Apply ice packs to the neck or areas of pain every 1-2 hours for 20 minutes at a time. Place a towel or cloth between your skin and the ice pack.    After the first 2 days, using heat can help with neck pain and stiffness. You may use a warm shower or bath, warm towels on the neck, or a heating pad. Do not sleep with a heating pad, as you can be burned.     Pain medications - You may take a pain medication such as Tylenol  (acetaminophen), Advil  and Motrin  (ibuprofen), or Aleve  (naproxen).    It is usually best to rest the neck for 1-2 days after an injury, then start gentle stretching exercises.     It is helpful to place a small pillow under the nape of your neck to provide proper neutral positioning.     You should stay active and do your usual work as much as you can, unless this involves heavy physical labor. Ask your provider if you need work restrictions.  If you were given a prescription for medicine here today, be sure to read all of the information (including the package insert) that comes with your prescription.  This will include important information about the  medicine, its side effects, and any warnings that you need to know about.  The pharmacist who fills the prescription can provide more information and answer questions you may have about the medicine.  If you have questions or concerns that the pharmacist cannot address, please call or return to the Emergency Department.   Remember that you can always come back to the Emergency Department if you are not able to see your regular provider in the amount of time listed above, if you get any new symptoms, or if there is anything that worries you.    Discharge Instructions  Back Pain  You were seen today for back pain. Back pain can have many causes, but most will get better without surgery or other specific treatment. Sometimes there is a herniated ( slipped ) disc. We don t usually do MRI scans to look for these right away, since most herniated discs will get better on their own with time.  Today, we did not find any evidence that your back pain was caused by a serious condition, such as an infection, fracture, or tumor. However, sometimes symptoms develop over time and cannot be found during an emergency visit, so it is very important that you follow up with your primary doctor.  Return to the Emergency Department if:    You develop a fever with your back pain.     You have weakness or change in sensation in one or both legs.    You lose control of your bowels or bladder, or can t empty your bladder.    Your pain gets much worse.     Follow-up with your doctor:    Unless your pain has completely gone away, please make an appointment with your doctor within one week.  You may need further management of your back pain, such as more pain medication, imaging such as an X-ray or MRI, or physical therapy.    What can I do to help myself?    Remain active -- People are often afraid that they will hurt their back further or delay recovery by remaining active, but this is one of the best things you can do for your back. In fact,  prolonged bed rest is not recommended. Studies have shown that people with low back pain recover faster when they remain active. Movement helps to bring blood flow to the muscles and relieve muscle spasms as well as preventing loss of muscle strength.    Heat -- Using a heating pad can help with low back pain during the first few weeks. Do not sleep with a heating pad, as you can be burned.     Pain medications -- Take a pain medication such as, acetaminophen (Tylenol ), ibuprofen (Advil , Nuprin  ) or naproxen (Aleve ).  If you have been given a narcotic (such as codeine, hydrocodone, or oxycodone) or a muscle relaxant (such as Flexeril   or Soma  ), do not drive for four hours after you have taken it. If the narcotic contains acetaminophen (Tylenol), do not take Tylenol with it. All narcotics will cause constipation, so eat a high fiber diet.          Remember that you can always come back to the Emergency Department if you are not able to see your regular doctor in the amount of time listed above, if you get any new symptoms, or if there is anything that worries you.    Opioid Medication Information    You have been given a prescription for an opioid (narcotic) pain medicine and/or have received a pain medicine while here in the Emergency Department. These medicines can make you drowsy or impaired. You must not drive, operate dangerous equipment, or engage in any other dangerous activities while taking these medications. If you drive while taking these medications, you could be arrested for DUI, or driving under the influence. Do not drink any alcohol while you are taking these medications.   Opioid pain medications can cause addiction. If you have a history of chemical dependency of any type, you are at a higher risk of becoming addicted to pain medications.  Only take these prescribed medications to treat your pain when all other options have been tried. Take it for as short a time and as few doses as possible.  Store your pain pills in a secure place, as they are frequently stolen and provide a dangerous opportunity for children or visitors in your house to start abusing these powerful medications. We will not replace any lost or stolen medicine.  As soon as your pain is better, you should flush all your remaining medication.   Many prescription pain medications contain Tylenol  (acetaminophen), including Vicodin , Tylenol #3 , Norco , Lortab , and Percocet .  You should not take any extra pills of Tylenol  if you are using these prescription medications or you can get very sick.  Do not ever take more than 4000 mg of acetaminophen in any 24 hour period.  All opioids tend to cause constipation. Drink plenty of water and eat foods that have a lot of fiber, such as fruits, vegetables, prune juice, apple juice and high fiber cereal.  Take a laxative if you don t move your bowels at least every other day. Miralax , Milk of Magnesia, Colace , or Senna  can be used to keep you regular.                24 Hour Appointment Hotline       To make an appointment at any [facility] clinic, call [phone] ([phone]). If you don't have a family doctor or clinic, we will help you find one. [facility] clinics are conveniently located to serve the needs of you and your family.             Review of your medicines      Our records show that you are taking the medicines listed below. If these are incorrect, please call your family doctor or clinic.        Dose / Directions Last dose taken    cefPROZIL 500 MG tablet   Commonly known as:  CEFZIL   Dose:  500 mg   Quantity:  20 tablet        Take 1 tablet (500 mg) by mouth 2 times daily   Refills:  0        fexofenadine 180 MG tablet   Commonly known as:  ALLEGRA   Dose:  180 mg   Quantity:  90 tablet        Take 1 tablet by mouth daily.   Refills:  1        fluticasone 50 MCG/ACT spray   Commonly known as:  FLONASE   Quantity:  16 g        SPRAY 2 SPRAYS INTO NOSTRIL DAILY   Refills:  3         methylPREDNISolone 4 MG tablet   Commonly known as:  MEDROL DOSEPAK   Quantity:  21 tablet        Follow package instructions   Refills:  0        OMEGA-3 FISH OIL PO        Take by mouth daily   Refills:  0        spironolactone 50 MG tablet   Commonly known as:  ALDACTONE   Dose:  50 mg        Take 50 mg by mouth 2 times daily   Refills:  0        vitamin B complex with vitamin C Tabs tablet   Dose:  1 tablet        Take 1 tablet by mouth daily   Refills:  0        vitamin D 1000 units capsule   Dose:  2 capsule        Take 2 capsules by mouth daily   Refills:  0                Orders Needing Specimen Collection     None      Pending Results     No orders found from 4/18/2018 to 4/21/2018.            Pending Culture Results     No orders found from 4/18/2018 to 4/21/2018.            Pending Results Instructions     If you had any lab results that were not finalized at the time of your Discharge, you can call the ED Lab Result RN at [phone]. You will be contacted by this team for any positive Lab results or changes in treatment. The nurses are available 7 days a week from 10A to 6:30P.  You can leave a message 24 hours per day and they will return your call.        Test Results From Your Hospital Stay               Clinical Quality Measure: Blood Pressure Screening     Your blood pressure was checked while you were in the emergency department today. The last reading we obtained was  BP: (!) 166/107 . Please read the guidelines below about what these numbers mean and what you should do about them.  If your systolic blood pressure (the top number) is less than 120 and your diastolic blood pressure (the bottom number) is less than 80, then your blood pressure is normal. There is nothing more that you need to do about it.  If your systolic blood pressure (the top number) is 120-139 or your diastolic blood pressure (the bottom number) is 80-89, your blood pressure may be higher than it should be. You should  have your blood pressure rechecked within a year by a primary care provider.  If your systolic blood pressure (the top number) is 140 or greater or your diastolic blood pressure (the bottom number) is 90 or greater, you may have high blood pressure. High blood pressure is treatable, but if left untreated over time it can put you at risk for heart attack, stroke, or kidney failure. You should have your blood pressure rechecked by a primary care provider within the next 4 weeks.  If your provider in the emergency department today gave you specific instructions to follow-up with your doctor or provider even sooner than that, you should follow that instruction and not wait for up to 4 weeks for your follow-up visit.        Thank you for choosing [facility]       Thank you for choosing [facility] for your care. Our goal is always to provide you with excellent care. Hearing back from our patients is one way we can continue to improve our services. Please take a few minutes to complete the written survey that you may receive in the mail after you visit with us. Thank you!        [employer] Information     [facility] [employer] gives you secure access to your electronic health record. If you see a primary care provider, you can also send messages to your care team and make appointments. If you have questions, please call your primary care clinic.  If you do not have a primary care provider, please call [phone] and they will assist you.        Care EveryWhere ID     This is your Care EveryWhere ID. This could be used by other organizations to access your [facility] medical records  [id number]        Equal Access to Services     MARISABEL BARKER : Hadneha Boyd, datda eileen, qaybta sudhaalalberto campos . So [facility] [phone].    ATENCIÓN: Si habla español, tiene a chanel disposición servicios gratuitos de asistencia lingüística. Llame al [phone].    We comply with applicable federal  civil rights laws and [place] laws. We do not discriminate on the basis of race, color, national origin, age, disability, sex, sexual orientation, or gender identity.            After Visit Summary       This is your record. Keep this with you and show to your community pharmacist(s) and doctor(s) at your next visit.
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
## 2018-04-20 NOTE — LETTER
April 20, 2018      To Whom It May Concern:      Mercedes Patel was seen in our Emergency Department today, 04/20/18.  Please excuse her from work until 4/22/18.    Sincerely,        Mary Anne Ronquillo MD

## 2018-04-20 NOTE — DISCHARGE INSTRUCTIONS
*You may resume diet and activities.  *Take medications as prescribed.  Ibuprofen and/or tylenol for pain. Continue your current medications.  *Follow-up with your orthopedics doctor in the next 2-3 days for reevaluation and ongoing physical therapy or prescriptions as needed.  *Return if you develop numbness, weakness, bowel or bladder incontinence, faint or feel like you will faint or become worse in any way.    Discharge Instructions  Neck Strain    You have been seen today for a neck sprain or strain.  Neck strains usually result from an injury to the neck. Car accidents, contact sports, and falls are common causes of neck strain. Sometimes your neck can start to hurt because of increased activity, muscle tension, an abnormal sleeping position, or because of other problems like arthritis in the neck.     Neck pain usually comes from injured muscles and ligaments. Sometimes there is a herniated ( slipped ) disc. We do not usually do MRI scans to look for these right away, since most herniated discs will get better on their own with time. Today, we did not find any evidence that your neck pain was caused by a serious or dangerous condition. However, sometimes symptoms develop over time and cannot be found during an emergency visit, so it is very important that you follow up with your primary provider.    Generally, every Emergency Department visit should have a follow-up clinic visit with either a primary or a specialty clinic/provider. Please follow-up as instructed by your emergency provider today.    Return to the Emergency Department if:    You have increasing pain in your neck.    You develop difficulty swallowing or breathing.    You have numbness, weakness, or trouble moving your arms or legs.    You have severe dizziness and difficulty walking.    You are unable to control your bladder or bowels.    You develop severe headache or ringing in the ears.    What can I do to help myself at home?    If you had  an injury, use cold for the first 1-2 days. Cold helps relieve pain and reduce inflammation.  Apply ice packs to the neck or areas of pain every 1-2 hours for 20 minutes at a time. Place a towel or cloth between your skin and the ice pack.    After the first 2 days, using heat can help with neck pain and stiffness. You may use a warm shower or bath, warm towels on the neck, or a heating pad. Do not sleep with a heating pad, as you can be burned.     Pain medications - You may take a pain medication such as Tylenol  (acetaminophen), Advil  and Motrin  (ibuprofen), or Aleve  (naproxen).    It is usually best to rest the neck for 1-2 days after an injury, then start gentle stretching exercises.     It is helpful to place a small pillow under the nape of your neck to provide proper neutral positioning.     You should stay active and do your usual work as much as you can, unless this involves heavy physical labor. Ask your provider if you need work restrictions.  If you were given a prescription for medicine here today, be sure to read all of the information (including the package insert) that comes with your prescription.  This will include important information about the medicine, its side effects, and any warnings that you need to know about.  The pharmacist who fills the prescription can provide more information and answer questions you may have about the medicine.  If you have questions or concerns that the pharmacist cannot address, please call or return to the Emergency Department.   Remember that you can always come back to the Emergency Department if you are not able to see your regular provider in the amount of time listed above, if you get any new symptoms, or if there is anything that worries you.    Discharge Instructions  Back Pain  You were seen today for back pain. Back pain can have many causes, but most will get better without surgery or other specific treatment. Sometimes there is a herniated  ( slipped ) disc. We don t usually do MRI scans to look for these right away, since most herniated discs will get better on their own with time.  Today, we did not find any evidence that your back pain was caused by a serious condition, such as an infection, fracture, or tumor. However, sometimes symptoms develop over time and cannot be found during an emergency visit, so it is very important that you follow up with your primary doctor.  Return to the Emergency Department if:    You develop a fever with your back pain.     You have weakness or change in sensation in one or both legs.    You lose control of your bowels or bladder, or can t empty your bladder.    Your pain gets much worse.     Follow-up with your doctor:    Unless your pain has completely gone away, please make an appointment with your doctor within one week.  You may need further management of your back pain, such as more pain medication, imaging such as an X-ray or MRI, or physical therapy.    What can I do to help myself?    Remain active -- People are often afraid that they will hurt their back further or delay recovery by remaining active, but this is one of the best things you can do for your back. In fact, prolonged bed rest is not recommended. Studies have shown that people with low back pain recover faster when they remain active. Movement helps to bring blood flow to the muscles and relieve muscle spasms as well as preventing loss of muscle strength.    Heat -- Using a heating pad can help with low back pain during the first few weeks. Do not sleep with a heating pad, as you can be burned.     Pain medications -- Take a pain medication such as, acetaminophen (Tylenol ), ibuprofen (Advil , Nuprin  ) or naproxen (Aleve ).  If you have been given a narcotic (such as codeine, hydrocodone, or oxycodone) or a muscle relaxant (such as Flexeril   or Soma  ), do not drive for four hours after you have taken it. If the narcotic contains acetaminophen  (Tylenol), do not take Tylenol with it. All narcotics will cause constipation, so eat a high fiber diet.          Remember that you can always come back to the Emergency Department if you are not able to see your regular doctor in the amount of time listed above, if you get any new symptoms, or if there is anything that worries you.    Opioid Medication Information    You have been given a prescription for an opioid (narcotic) pain medicine and/or have received a pain medicine while here in the Emergency Department. These medicines can make you drowsy or impaired. You must not drive, operate dangerous equipment, or engage in any other dangerous activities while taking these medications. If you drive while taking these medications, you could be arrested for DUI, or driving under the influence. Do not drink any alcohol while you are taking these medications.   Opioid pain medications can cause addiction. If you have a history of chemical dependency of any type, you are at a higher risk of becoming addicted to pain medications.  Only take these prescribed medications to treat your pain when all other options have been tried. Take it for as short a time and as few doses as possible. Store your pain pills in a secure place, as they are frequently stolen and provide a dangerous opportunity for children or visitors in your house to start abusing these powerful medications. We will not replace any lost or stolen medicine.  As soon as your pain is better, you should flush all your remaining medication.   Many prescription pain medications contain Tylenol  (acetaminophen), including Vicodin , Tylenol #3 , Norco , Lortab , and Percocet .  You should not take any extra pills of Tylenol  if you are using these prescription medications or you can get very sick.  Do not ever take more than 4000 mg of acetaminophen in any 24 hour period.  All opioids tend to cause constipation. Drink plenty of water and eat foods that have a lot of  fiber, such as fruits, vegetables, prune juice, apple juice and high fiber cereal.  Take a laxative if you don t move your bowels at least every other day. Miralax , Milk of Magnesia, Colace , or Senna  can be used to keep you regular.

## 2018-05-24 ENCOUNTER — OFFICE VISIT (OUTPATIENT)
Dept: FAMILY MEDICINE | Facility: CLINIC | Age: 53
End: 2018-05-24
Payer: COMMERCIAL

## 2018-05-24 VITALS
TEMPERATURE: 97.2 F | BODY MASS INDEX: 24.1 KG/M2 | SYSTOLIC BLOOD PRESSURE: 126 MMHG | HEIGHT: 63 IN | DIASTOLIC BLOOD PRESSURE: 88 MMHG | WEIGHT: 136 LBS | HEART RATE: 68 BPM

## 2018-05-24 DIAGNOSIS — Z51.81 ENCOUNTER FOR THERAPEUTIC DRUG MONITORING: ICD-10-CM

## 2018-05-24 DIAGNOSIS — Z12.31 VISIT FOR SCREENING MAMMOGRAM: ICD-10-CM

## 2018-05-24 DIAGNOSIS — E04.2 NONTOXIC MULTINODULAR GOITER: ICD-10-CM

## 2018-05-24 DIAGNOSIS — Z00.00 ROUTINE ADULT HEALTH MAINTENANCE: Primary | ICD-10-CM

## 2018-05-24 PROBLEM — L70.9 ADULT ACNE: Status: ACTIVE | Noted: 2018-05-24

## 2018-05-24 LAB
POTASSIUM SERPL-SCNC: 4.1 MMOL/L (ref 3.4–5.3)
TSH SERPL DL<=0.005 MIU/L-ACNC: 1.11 MU/L (ref 0.4–4)

## 2018-05-24 PROCEDURE — 84443 ASSAY THYROID STIM HORMONE: CPT | Performed by: INTERNAL MEDICINE

## 2018-05-24 PROCEDURE — 36415 COLL VENOUS BLD VENIPUNCTURE: CPT | Performed by: INTERNAL MEDICINE

## 2018-05-24 PROCEDURE — 99396 PREV VISIT EST AGE 40-64: CPT | Performed by: INTERNAL MEDICINE

## 2018-05-24 PROCEDURE — 84132 ASSAY OF SERUM POTASSIUM: CPT | Performed by: INTERNAL MEDICINE

## 2018-05-24 NOTE — PROGRESS NOTES
SUBJECTIVE:   CC: Mercedes Patel is an 53 year old woman who presents for preventive health visit.     Lives with  and 19 year old. Two older kids out of the house.  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     Healthy Habits:    Do you get at least three servings of calcium containing foods daily (dairy, green leafy vegetables, etc.)? No     Amount of exercise or daily activities, outside of work: 5 day(s) per week    Problems taking medications regularly No    Medication side effects: No    Have you had an eye exam in the past two years? yes    Do you see a dentist twice per year? yes    Do you have sleep apnea, excessive snoring or daytime drowsiness?no      Concerned about her thyroid.  Used to see endocrine. Was monitored for thyroid nodules, which have been stable for years.  Also her TSH has been monitored regularly and has always been normal.  She has been feeling more fatigued than usual and having some constipation and would like to check up on the labs and ultrasound.         Today's PHQ-2 Score:   PHQ-2 ( 1999 Pf[employer]) 5/24/2018 6/29/2017   Q1: Little interest or pleasure in doing things 0 0   Q2: Feeling down, depressed or hopeless 0 0   PHQ-2 Score 0 0   Q1: Little interest or pleasure in doing things - -   Q2: Feeling down, depressed or hopeless - -   PHQ-2 Score - -       Abuse: Current or Past(Physical, Sexual or Emotional)- NO  Do you feel safe in your environment - YES    Social History   Substance Use Topics     Smoking status: Never Smoker     Smokeless tobacco: Never Used     Alcohol use 0.0 oz/week     0 Standard drinks or equivalent per week      Comment: rarely 1/month     If you drink alcohol do you typically have >3 drinks per day or >7 drinks per week? No                     Reviewed orders with patient.  Reviewed health maintenance and updated orders accordingly - Yes  Patient Active Problem List   Diagnosis     Nontoxic multinodular  goiter     Leiomyoma of uterus     Sinusitis, chronic     CARDIOVASCULAR SCREENING; LDL GOAL LESS THAN 160     Perennial allergic rhinitis, unspecified allergic rhinitis trigger     Chronic right-sided low back pain with right-sided sciatica     Adult acne     Past Surgical History:   Procedure Laterality Date     ENDOSCOPY       HC CT MAXILLOFACIAL W/O CONTRAST  2006    chronic sinus disease     HC MRI BRAIN W/O CONTRAST  2004    MRI brain pos sinusitis     [facility] US SOFT TISSUE HEAD/NECK  2/08, 3/10    thyroid 3 nodules unchanged, f/b endocrine, s/p FNA #4 isthmus nodule benign 9/10     HYSTEROSCOPY,ABLATION ENDOMETRIUM  2008         SONO PELVIS COMPLETE  2/06, 3/08,12/11    fibroids     TONSILLECTOMY       US ABDOMEN LIMITED PORTABLE  2011    nl       Social History   Substance Use Topics     Smoking status: Never Smoker     Smokeless tobacco: Never Used     Alcohol use 0.0 oz/week     0 Standard drinks or equivalent per week      Comment: rarely 1/month     Family History   Problem Relation Age of Onset     Lipids Mother      Hypertension Mother      Hypertension Father      Lipids Father      Blood Disease Father      clotting d/o with factor V leiden-pt negative     Coronary Artery Disease Father      CEREBROVASCULAR DISEASE Maternal Grandfather      Cancer - colorectal Maternal Grandfather          Current Outpatient Prescriptions   Medication Sig Dispense Refill     Cholecalciferol (VITAMIN D) 1000 UNITS capsule Take 2 capsules by mouth daily        fexofenadine (ALLEGRA) 180 MG tablet Take 1 tablet by mouth daily. 90 tablet 1     fluticasone (FLONASE) 50 MCG/ACT spray SPRAY 2 SPRAYS INTO NOSTRIL DAILY 16 g 3     Omega-3 Fatty Acids (OMEGA-3 FISH OIL PO) Take by mouth daily       spironolactone (ALDACTONE) 50 MG tablet Take 50 mg by mouth 2 times daily  0       Patient over age 50, mutual decision to screen reflected in health maintenance.    Pertinent mammograms are reviewed under the imaging  "tab.  History of abnormal Pap smear: NO - age 30- 65 PAP every 3 years recommended    Reviewed and updated as needed this visit by clinical staff  Tobacco  Allergies  Meds  Problems  Med Hx  Surg Hx  Fam Hx  Soc Hx          Reviewed and updated as needed this visit by Provider  Tobacco  Meds  Problems  Med Hx  Surg Hx  Fam Hx  Soc Hx           ROS:  CONSTITUTIONAL: NEGATIVE for fever, chills, change in weight  INTEGUMENTARY/SKIN: NEGATIVE for worrisome rashes, moles or lesions  EYES: NEGATIVE for vision changes or irritation  ENT: NEGATIVE for ear, mouth and throat problems  RESP: NEGATIVE for significant cough or SOB  BREAST: NEGATIVE for masses, tenderness or discharge  CV: NEGATIVE for chest pain, palpitations or peripheral edema  GI: NEGATIVE for nausea, abdominal pain, heartburn, or change in bowel habits  : NEGATIVE for unusual urinary or vaginal symptoms. No vaginal bleeding.  MUSCULOSKELETAL: NEGATIVE for significant arthralgias or myalgia  NEURO: NEGATIVE for weakness, dizziness or paresthesias  PSYCHIATRIC: NEGATIVE for changes in mood or affect     OBJECTIVE:   /88 (BP Location: Right arm, Patient Position: Chair, Cuff Size: Adult Regular)  Pulse 68  Temp 97.2  F (36.2  C) (Tympanic)  Ht 5' 3\" (1.6 m)  Wt 136 lb (61.7 kg)  BMI 24.09 kg/m2    EXAM:  GENERAL: healthy, alert and no distress  EYES: Eyes grossly normal to inspection, PERRL and conjunctivae and sclerae normal  HENT: ear canals and TM's normal, mouth without ulcers or lesions  NECK: no adenopathy, no asymmetry, masses, or scars and thyroid mildly enlarged   RESP: lungs clear to auscultation - no rales, rhonchi or wheezes  BREAST: normal without masses, tenderness or nipple discharge and no palpable axillary masses or adenopathy  CV: regular rate and rhythm, normal S1 S2, no S3 or S4, no murmur, click or rub, no peripheral edema and peripheral pulses strong  ABDOMEN: soft, nontender, no masses and bowel sounds " "normal  MS: no gross musculoskeletal defects noted, no edema  SKIN: no suspicious lesions or rashes  NEURO: Normal strength and tone, mentation intact and speech normal  PSYCH: mentation appears normal, affect normal/bright    ASSESSMENT/PLAN:   1. Routine adult health maintenance  Due for mammogram   Colonoscopy and pap UTD   Lipids done last year, ASCVD score 1.9%, do not need to repeat today   Recommended she check with her insurance re Shingrix     2. Visit for screening mammogram  - MA SCREENING DIGITAL BILAT - Future  (s+30); Future    3. Nontoxic multinodular goiter  - TSH with free T4 reflex  - US Thyroid; Future    4. Encounter for therapeutic drug monitoring  On spironolactone for acne   - Potassium    COUNSELING:   Reviewed preventive health counseling, as reflected in patient instructions       Regular exercise       Healthy diet/nutrition       Osteoporosis Prevention/Bone Health    BP Screening:   Last 3 BP Readings:    BP Readings from Last 3 Encounters:   05/24/18 126/88   04/20/18 (!) 166/107   09/18/17 116/82       The following was recommended to the patient:  Re-screen BP within a year and recommended lifestyle modifications     reports that she has never smoked. She has never used smokeless tobacco.    Estimated body mass index is 24.09 kg/(m^2) as calculated from the following:    Height as of this encounter: 5' 3\" (1.6 m).    Weight as of this encounter: 136 lb (61.7 kg).       Counseling Resources:  ATP IV Guidelines  Pooled Cohorts Equation Calculator  Breast Cancer Risk Calculator  FRAX Risk Assessment  ICSI Preventive Guidelines  Dietary Guidelines for Americans, 2010  USDA's MyPlate  ASA Prophylaxis  Lung CA Screening    Carlota Polanco MD  [facility]  "

## 2018-05-24 NOTE — MR AVS SNAPSHOT
After Visit Summary   5/24/2018    Mercedes Patel    MRN: [id number]           Patient Information     Date Of Birth          1965        Visit Information        Provider Department      5/24/2018 9:40 AM Carlota Polanco [place] [facility] [place]        Today's Diagnoses     Nontoxic multinodular goiter    -  1    Visit for screening mammogram        Screening for malignant neoplasm of cervix        Screening for HIV (human immunodeficiency virus)        Encounter for therapeutic drug monitoring          Care Instructions    Check with insurance about Shingrix vaccine     Preventive Health Recommendations  Female Ages 50 - 64    Yearly exam: See your health care provider every year in order to  o Review health changes.   o Discuss preventive care.    o Review your medicines if your doctor has prescribed any.      Get a Pap test every three years (unless you have an abnormal result and your provider advises testing more often).    If you get Pap tests with HPV test, you only need to test every 5 years, unless you have an abnormal result.     You do not need a Pap test if your uterus was removed (hysterectomy) and you have not had cancer.    You should be tested each year for STDs (sexually transmitted diseases) if you're at risk.     Have a mammogram every 1 to 2 years.    Have a colonoscopy at age 50, or have a yearly FIT test (stool test). These exams screen for colon cancer.      Have a cholesterol test every 5 years, or more often if advised.    Have a diabetes test (fasting glucose) every three years. If you are at risk for diabetes, you should have this test more often.     If you are at risk for osteoporosis (brittle bone disease), think about having a bone density scan (DEXA).    Shots: Get a flu shot each year. Get a tetanus shot every 10 years.    Nutrition:     Eat at least 5 servings of fruits and vegetables each day.    Eat whole-grain bread, whole-wheat pasta and brown  rice instead of white grains and rice.    Talk to your provider about Calcium and Vitamin D.     Lifestyle    Exercise at least 150 minutes a week (30 minutes a day, 5 days a week). This will help you control your weight and prevent disease.    Limit alcohol to one drink per day.    No smoking.     Wear sunscreen to prevent skin cancer.     See your dentist every six months for an exam and cleaning.    See your eye doctor every 1 to 2 years.            Follow-ups after your visit        Follow-up notes from your care team     Return in about 1 year (around 5/24/2019) for Physical Exam.      Future tests that were ordered for you today     Open Future Orders        Priority Expected Expires Ordered    MA SCREENING DIGITAL BILAT - Future  (s+30) Routine  5/24/2019 5/24/2018     Thyroid Routine  5/24/2019 5/24/2018            Who to contact     If you have questions or need follow up information about today's clinic visit or your schedule please contact [facility] directly at [phone].  Normal or non-critical lab and imaging results will be communicated to you by [employer], letter or phone within 4 business days after the clinic has received the results. If you do not hear from us within 7 days, please contact the clinic through [employer] or phone. If you have a critical or abnormal lab result, we will notify you by phone as soon as possible.  Submit refill requests through [employer] or call your pharmacy and they will forward the refill request to us. Please allow 3 business days for your refill to be completed.          Additional Information About Your Visit        [employer] Information     [employer] gives you secure access to your electronic health record. If you see a primary care provider, you can also send messages to your care team and make appointments. If you have questions, please call your primary care clinic.  If you do not have a primary care provider, please call [phone] and they will  "assist you.        Care EveryWhere ID     This is your Care EveryWhere ID. This could be used by other organizations to access your [facility] medical records  [id number]        Your Vitals Were     Pulse Temperature Height BMI (Body Mass Index)          68 97.2  F (36.2  C) (Tympanic) 5' 3\" (1.6 m) 24.09 kg/m2         Blood Pressure from Last 3 Encounters:   05/24/18 130/90   04/20/18 (!) 166/107   09/18/17 116/82    Weight from Last 3 Encounters:   05/24/18 136 lb (61.7 kg)   04/20/18 135 lb (61.2 kg)   09/18/17 132 lb (59.9 kg)              We Performed the Following     Potassium     TSH with free T4 reflex          Today's Medication Changes          These changes are accurate as of 5/24/18 10:12 AM.  If you have any questions, ask your nurse or doctor.               Stop taking these medicines if you haven't already. Please contact your care team if you have questions.     vitamin B complex with vitamin C Tabs tablet   Stopped by:  Carlota Polanco MD                    Primary Care Provider Office Phone # Fax #    [facility] [phone]       [address] [place], [place] [id number]        Equal Access to Services     MARISABEL BARKER AH: Hadii aad ku hadasho Sonataliaali, waaxda luqadaha, qaybta kaalmada adeegyada, alberto rees. So [facility] [phone].    ATENCIÓN: Si habla español, tiene a chanel disposición servicios gratuitos de asistencia lingüística. Llame al [phone].    We comply with applicable federal civil rights laws and [place] laws. We do not discriminate on the basis of race, color, national origin, age, disability, sex, sexual orientation, or gender identity.            Thank you!     Thank you for choosing [facility]  for your care. Our goal is always to provide you with excellent care. Hearing back from our patients is one way we can continue to improve our services. Please take a few minutes to complete the " written survey that you may receive in the mail after your visit with us. Thank you!             Your Updated Medication List - Protect others around you: Learn how to safely use, store and throw away your medicines at www.disposemymeds.org.          This list is accurate as of 5/24/18 10:12 AM.  Always use your most recent med list.                   Brand Name Dispense Instructions for use Diagnosis    fexofenadine 180 MG tablet    ALLEGRA    90 tablet    Take 1 tablet by mouth daily.    Allergic rhinitis, cause unspecified       fluticasone 50 MCG/ACT spray    FLONASE    16 g    SPRAY 2 SPRAYS INTO NOSTRIL DAILY    Perennial allergic rhinitis       OMEGA-3 FISH OIL PO      Take by mouth daily        spironolactone 50 MG tablet    ALDACTONE     Take 50 mg by mouth 2 times daily        vitamin D 1000 units capsule      Take 2 capsules by mouth daily

## 2018-05-24 NOTE — PATIENT INSTRUCTIONS
Check with insurance about Shingrix vaccine     Preventive Health Recommendations  Female Ages 50 - 64    Yearly exam: See your health care provider every year in order to  o Review health changes.   o Discuss preventive care.    o Review your medicines if your doctor has prescribed any.      Get a Pap test every three years (unless you have an abnormal result and your provider advises testing more often).    If you get Pap tests with HPV test, you only need to test every 5 years, unless you have an abnormal result.     You do not need a Pap test if your uterus was removed (hysterectomy) and you have not had cancer.    You should be tested each year for STDs (sexually transmitted diseases) if you're at risk.     Have a mammogram every 1 to 2 years.    Have a colonoscopy at age 50, or have a yearly FIT test (stool test). These exams screen for colon cancer.      Have a cholesterol test every 5 years, or more often if advised.    Have a diabetes test (fasting glucose) every three years. If you are at risk for diabetes, you should have this test more often.     If you are at risk for osteoporosis (brittle bone disease), think about having a bone density scan (DEXA).    Shots: Get a flu shot each year. Get a tetanus shot every 10 years.    Nutrition:     Eat at least 5 servings of fruits and vegetables each day.    Eat whole-grain bread, whole-wheat pasta and brown rice instead of white grains and rice.    Talk to your provider about Calcium and Vitamin D.     Lifestyle    Exercise at least 150 minutes a week (30 minutes a day, 5 days a week). This will help you control your weight and prevent disease.    Limit alcohol to one drink per day.    No smoking.     Wear sunscreen to prevent skin cancer.     See your dentist every six months for an exam and cleaning.    See your eye doctor every 1 to 2 years.

## 2018-06-06 ENCOUNTER — MYC MEDICAL ADVICE (OUTPATIENT)
Dept: FAMILY MEDICINE | Facility: CLINIC | Age: 53
End: 2018-06-06

## 2018-06-06 DIAGNOSIS — E04.2 NONTOXIC MULTINODULAR GOITER: Primary | ICD-10-CM

## 2018-10-26 ENCOUNTER — HOSPITAL ENCOUNTER (OUTPATIENT)
Dept: ULTRASOUND IMAGING | Facility: CLINIC | Age: 53
Discharge: HOME | End: 2018-10-26
Attending: INTERNAL MEDICINE | Admitting: INTERNAL MEDICINE
Payer: COMMERCIAL

## 2018-10-26 DIAGNOSIS — E04.2 NONTOXIC MULTINODULAR GOITER: ICD-10-CM

## 2018-10-26 PROCEDURE — 76536 US EXAM OF HEAD AND NECK: CPT

## 2018-11-05 ENCOUNTER — TRANSFERRED RECORDS (OUTPATIENT)
Dept: HEALTH INFORMATION MANAGEMENT | Facility: CLINIC | Age: 53
End: 2018-11-05

## 2019-02-11 ENCOUNTER — OFFICE VISIT (OUTPATIENT)
Dept: FAMILY MEDICINE | Facility: CLINIC | Age: 54
End: 2019-02-11
Payer: COMMERCIAL

## 2019-02-11 ENCOUNTER — TELEPHONE (OUTPATIENT)
Dept: FAMILY MEDICINE | Facility: CLINIC | Age: 54
End: 2019-02-11

## 2019-02-11 VITALS
OXYGEN SATURATION: 97 % | BODY MASS INDEX: 24.13 KG/M2 | HEIGHT: 63 IN | SYSTOLIC BLOOD PRESSURE: 121 MMHG | WEIGHT: 136.2 LBS | HEART RATE: 91 BPM | DIASTOLIC BLOOD PRESSURE: 84 MMHG | TEMPERATURE: 97.7 F

## 2019-02-11 DIAGNOSIS — J01.90 ACUTE BACTERIAL RHINOSINUSITIS: Primary | ICD-10-CM

## 2019-02-11 DIAGNOSIS — B96.89 ACUTE BACTERIAL RHINOSINUSITIS: Primary | ICD-10-CM

## 2019-02-11 DIAGNOSIS — R05.9 COUGH: Primary | ICD-10-CM

## 2019-02-11 PROCEDURE — 99213 OFFICE O/P EST LOW 20 MIN: CPT | Performed by: INTERNAL MEDICINE

## 2019-02-11 RX ORDER — CODEINE PHOSPHATE AND GUAIFENESIN 10; 100 MG/5ML; MG/5ML
1-2 SOLUTION ORAL EVERY 4 HOURS PRN
Qty: 120 ML | Refills: 0 | Status: SHIPPED | OUTPATIENT
Start: 2019-02-11 | End: 2019-02-11

## 2019-02-11 RX ORDER — DOXYCYCLINE 100 MG/1
100 CAPSULE ORAL 2 TIMES DAILY
Qty: 20 CAPSULE | Refills: 0 | Status: SHIPPED | OUTPATIENT
Start: 2019-02-11 | End: 2019-02-21

## 2019-02-11 RX ORDER — FLUTICASONE PROPIONATE 50 MCG
1 SPRAY, SUSPENSION (ML) NASAL DAILY
Qty: 1 BOTTLE | Refills: 11 | Status: SHIPPED | OUTPATIENT
Start: 2019-02-11 | End: 2020-02-11

## 2019-02-11 RX ORDER — BENZONATATE 100 MG/1
100 CAPSULE ORAL 3 TIMES DAILY PRN
Qty: 30 CAPSULE | Refills: 1 | Status: SHIPPED | OUTPATIENT
Start: 2019-02-11 | End: 2019-02-18

## 2019-02-11 RX ORDER — CODEINE PHOSPHATE AND GUAIFENESIN 10; 100 MG/5ML; MG/5ML
1-2 SOLUTION ORAL EVERY 4 HOURS PRN
Qty: 120 ML | Refills: 0 | Status: SHIPPED | OUTPATIENT
Start: 2019-02-11 | End: 2020-06-29

## 2019-02-11 ASSESSMENT — ANXIETY QUESTIONNAIRES
6. BECOMING EASILY ANNOYED OR IRRITABLE: NOT AT ALL
GAD7 TOTAL SCORE: 0
7. FEELING AFRAID AS IF SOMETHING AWFUL MIGHT HAPPEN: NOT AT ALL
1. FEELING NERVOUS, ANXIOUS, OR ON EDGE: NOT AT ALL
5. BEING SO RESTLESS THAT IT IS HARD TO SIT STILL: NOT AT ALL
2. NOT BEING ABLE TO STOP OR CONTROL WORRYING: NOT AT ALL
3. WORRYING TOO MUCH ABOUT DIFFERENT THINGS: NOT AT ALL

## 2019-02-11 ASSESSMENT — PATIENT HEALTH QUESTIONNAIRE - PHQ9
5. POOR APPETITE OR OVEREATING: NOT AT ALL
SUM OF ALL RESPONSES TO PHQ QUESTIONS 1-9: 0

## 2019-02-11 ASSESSMENT — MIFFLIN-ST. JEOR: SCORE: 1191.93

## 2019-02-11 NOTE — TELEPHONE ENCOUNTER
Reason for call:  Patient reporting a symptom    Symptom or request: Cough.     Duration (how long have symptoms been present): Seven days.     Have you been treated for this before? No. Patient had appt today and forgot to ask for cough med.     Phone Number patient can be reached at:  Home number on file [phone] (home)    Best Time:  Any     Can we leave a detailed message on this number:  YES    Call taken on 2/11/2019 at 3:19 PM by Radha [place]

## 2019-02-11 NOTE — PROGRESS NOTES
"  SUBJECTIVE:   Mercedes Patel is a 53 year old female who presents to clinic today for the following health issues:      Acute Illness   Acute illness concerns: cold  Onset: a week    Fever: no     Chills/Sweats: YES- Chills on and off    Headache (location?): YES    Sinus Pressure:YES    Conjunctivitis:  no    Ear Pain: YES: left    Rhinorrhea: YES    Congestion: YES    Sore Throat: YES     Cough: YES-productive of clear sputum, productive of yellow sputum    Wheeze: no     Decreased Appetite: yes    Nausea: no     Vomiting: no     Diarrhea:  no     Dysuria/Freq.: no     Fatigue/Achiness: YES    Sick/Strep Exposure: no      Therapies Tried and outcome: Mucinex Sudafed and Ibprophen    Mercedes had a mild cough for the past 2 weeks.  More recently, she developed a cough.  The in the last day or 2 has had left-sided sinus pain and pressure, goes into her teeth.  Feels like she is getting sinus infection.  He is to get them quite frequently, but has not had one in a while.      Reviewed and updated as needed this visit by clinical staff  Tobacco  Allergies  Meds  Med Hx  Surg Hx  Fam Hx  Soc Hx      Reviewed and updated as needed this visit by Provider         ROS:  Constitutional, HEENT, cardiovascular, pulmonary, gi and gu systems are negative, except as otherwise noted.    OBJECTIVE:     /84   Pulse 91   Temp 97.7  F (36.5  C)   Ht 1.6 m (5' 3\")   Wt 61.8 kg (136 lb 3.2 oz)   SpO2 97%   BMI 24.13 kg/m    Body mass index is 24.13 kg/m .    Gen: well appearing, pleasant woman, no distress  HEENT: PERRL, no conjunctival injection, no posterior pharynx erythema, MMM.  TM normal b/l.  + left sided maxillary sinus tenderness.   Neck: supple, no LAD  Pulm: breathing comfortably, CTAB, no wheezes or rales  CV: RRR, normal S1 and S2, no murmurs        ASSESSMENT/PLAN:       1. Acute bacterial rhinosinusitis  Acute worsening of cold symptoms with unilateral pain.  Will treat for ABRS.   - doxycycline " hyclate (VIBRAMYCIN) 100 MG capsule; Take 1 capsule (100 mg) by mouth 2 times daily for 10 days  Dispense: 20 capsule; Refill: 0  - fluticasone (FLONASE) 50 MCG/ACT nasal spray; Spray 1 spray into both nostrils daily  Dispense: 1 Bottle; Refill: 11    F/U - HME in 3 months     Carlota Polanco MD  [facility]

## 2019-02-11 NOTE — TELEPHONE ENCOUNTER
Patient called with information below from Dr. Polanco:     Yes, I thought about that after she left the visit as well!  I sent in tessalon pearls to take prn, these shouldn't make her drowsy.  I also printed a robitussin with codeine (in med box) to fax over if she'd like, this is helpful for nighttime as it makes you  Drowsy as well as being a cough suppressant.     Patient stated that she would like the robitussin with codeine and would not like to take the tessalon pearls since she only has a problem at night.     Routing to provider as FYI.     Routing to team 3 to fax medication robitussin with codeine to pharmacy.     Emani ABELN, RN   [facility]

## 2019-02-11 NOTE — TELEPHONE ENCOUNTER
Pt would like the rx for robitussin sent to [facility].    Sha Simmons printed new rx for guanfacine with codeine with [facility] pharmacy listed and nurse faxed to pharmacy.     rx for guanfacine with codeine with  [facility] pharmacy on it shredded by nurse.    Nhung BERMUDEZ RN  EP Triage

## 2019-02-11 NOTE — TELEPHONE ENCOUNTER
Yes, I thought about that after she left the visit as well!  I sent in tessalon pearls to take prn, these shouldn't make her drowsy.  I also printed a robitussin with codeine (in med box) to fax over if she'd like, this is helpful for nighttime as it makes you  Drowsy as well as being a cough suppressant.     Carlota Polanco MD

## 2019-02-11 NOTE — TELEPHONE ENCOUNTER
Pt calling forgot to ask for something for the cough at appt today.  The cough does wake her up when sleeping.  Hoping Dr. Polanco could give her something for the cough.    Pharmacy pended.    Pt can be reached at [phone].    Nhung BERMUDEZ RN  EP Triage

## 2019-02-12 ASSESSMENT — ANXIETY QUESTIONNAIRES: GAD7 TOTAL SCORE: 0

## 2019-09-26 ENCOUNTER — TRANSFERRED RECORDS (OUTPATIENT)
Dept: HEALTH INFORMATION MANAGEMENT | Facility: CLINIC | Age: 54
End: 2019-09-26

## 2019-12-09 ENCOUNTER — HEALTH MAINTENANCE LETTER (OUTPATIENT)
Age: 54
End: 2019-12-09

## 2020-03-15 ENCOUNTER — HEALTH MAINTENANCE LETTER (OUTPATIENT)
Age: 55
End: 2020-03-15

## 2020-05-12 ENCOUNTER — RESULTS ONLY (OUTPATIENT)
Dept: LAB | Age: 55
End: 2020-05-12

## 2020-05-12 ENCOUNTER — APPOINTMENT (OUTPATIENT)
Dept: LAB | Facility: CLINIC | Age: 55
End: 2020-05-12
Payer: COMMERCIAL

## 2020-05-14 LAB
COVID-19 SPIKE RBD ABY TITER: NORMAL
COVID-19 SPIKE RBD ABY: NEGATIVE

## 2020-06-22 ENCOUNTER — E-VISIT (OUTPATIENT)
Dept: FAMILY MEDICINE | Facility: CLINIC | Age: 55
End: 2020-06-22
Payer: COMMERCIAL

## 2020-06-22 DIAGNOSIS — B37.31 YEAST INFECTION OF THE VAGINA: Primary | ICD-10-CM

## 2020-06-22 PROCEDURE — 99421 OL DIG E/M SVC 5-10 MIN: CPT | Performed by: INTERNAL MEDICINE

## 2020-06-23 RX ORDER — FLUCONAZOLE 150 MG/1
TABLET ORAL
Qty: 2 TABLET | Refills: 0 | Status: SHIPPED | OUTPATIENT
Start: 2020-06-23 | End: 2020-11-24

## 2020-06-29 ENCOUNTER — OFFICE VISIT (OUTPATIENT)
Dept: URGENT CARE | Facility: URGENT CARE | Age: 55
End: 2020-06-29
Payer: COMMERCIAL

## 2020-06-29 VITALS
OXYGEN SATURATION: 99 % | SYSTOLIC BLOOD PRESSURE: 151 MMHG | HEART RATE: 99 BPM | DIASTOLIC BLOOD PRESSURE: 99 MMHG | TEMPERATURE: 97.9 F

## 2020-06-29 DIAGNOSIS — N30.00 ACUTE CYSTITIS WITHOUT HEMATURIA: ICD-10-CM

## 2020-06-29 DIAGNOSIS — R30.0 DYSURIA: Primary | ICD-10-CM

## 2020-06-29 DIAGNOSIS — R82.90 NONSPECIFIC FINDING ON EXAMINATION OF URINE: ICD-10-CM

## 2020-06-29 LAB
ALBUMIN UR-MCNC: NEGATIVE MG/DL
APPEARANCE UR: CLEAR
BACTERIA #/AREA URNS HPF: ABNORMAL /HPF
BILIRUB UR QL STRIP: NEGATIVE
COLOR UR AUTO: YELLOW
GLUCOSE UR STRIP-MCNC: NEGATIVE MG/DL
HGB UR QL STRIP: ABNORMAL
KETONES UR STRIP-MCNC: NEGATIVE MG/DL
LEUKOCYTE ESTERASE UR QL STRIP: ABNORMAL
NITRATE UR QL: NEGATIVE
NON-SQ EPI CELLS #/AREA URNS LPF: ABNORMAL /LPF
PH UR STRIP: 7.5 PH (ref 5–7)
RBC #/AREA URNS AUTO: ABNORMAL /HPF
SOURCE: ABNORMAL
SP GR UR STRIP: 1.02 (ref 1–1.03)
UROBILINOGEN UR STRIP-ACNC: 0.2 EU/DL (ref 0.2–1)
WBC #/AREA URNS AUTO: ABNORMAL /HPF

## 2020-06-29 PROCEDURE — 81001 URINALYSIS AUTO W/SCOPE: CPT | Performed by: PHYSICIAN ASSISTANT

## 2020-06-29 PROCEDURE — 87186 SC STD MICRODIL/AGAR DIL: CPT | Performed by: PHYSICIAN ASSISTANT

## 2020-06-29 PROCEDURE — 87086 URINE CULTURE/COLONY COUNT: CPT | Performed by: PHYSICIAN ASSISTANT

## 2020-06-29 PROCEDURE — 99213 OFFICE O/P EST LOW 20 MIN: CPT

## 2020-06-29 PROCEDURE — 87088 URINE BACTERIA CULTURE: CPT | Performed by: PHYSICIAN ASSISTANT

## 2020-06-29 RX ORDER — PHENAZOPYRIDINE HYDROCHLORIDE 95 MG/1
190 TABLET ORAL 3 TIMES DAILY
Qty: 42 TABLET | Refills: 0 | Status: SHIPPED | OUTPATIENT
Start: 2020-06-29 | End: 2020-07-06

## 2020-06-29 RX ORDER — NITROFURANTOIN 25; 75 MG/1; MG/1
100 CAPSULE ORAL 2 TIMES DAILY
Qty: 10 CAPSULE | Refills: 0 | Status: SHIPPED | OUTPATIENT
Start: 2020-06-29 | End: 2020-07-04

## 2020-06-29 ASSESSMENT — ENCOUNTER SYMPTOMS
NEUROLOGICAL NEGATIVE: 1
DYSURIA: 1
PSYCHIATRIC NEGATIVE: 1
CONSTITUTIONAL NEGATIVE: 1
FLANK PAIN: 0
RESPIRATORY NEGATIVE: 1
CARDIOVASCULAR NEGATIVE: 1
FREQUENCY: 1
GASTROINTESTINAL NEGATIVE: 1
DIFFICULTY URINATING: 0

## 2020-06-29 NOTE — PROGRESS NOTES
SUBJECTIVE:   Mercedes Patel is a 55 year old female presenting with a chief complaint of   Chief Complaint   Patient presents with     Urgent Care     UTI     frequency,burning.       She is an established patient of [place].    UTI    Onset of symptoms was 4day(s).  Course of illness is worsening  Severity moderate  Current and associated symptoms dysuria, frequency, urgency and burning  Treatment and measures tried None  Predisposing factors include none  Patient denies rigors, flank pain, temperature > 101 degrees F., vomiting, taking Coumadin, GFR less than 30 within the last year, vaginal discharge, vaginal odor, vaginal itching and dyspareunia        Review of Systems   Constitutional: Negative.    HENT: Negative.    Respiratory: Negative.    Cardiovascular: Negative.    Gastrointestinal: Negative.    Genitourinary: Positive for decreased urine volume, dysuria, frequency and urgency. Negative for difficulty urinating, dyspareunia, flank pain, pelvic pain, vaginal bleeding, vaginal discharge and vaginal pain.   Skin: Negative.    Neurological: Negative.    Psychiatric/Behavioral: Negative.        Past Medical History:   Diagnosis Date     Acne      Allergic rhinitis      Chronic fatigue      Constipation      Disorders of bursae and tendons in shoulder region, unspecified     Lt shoulder s/p steroid injection 7/02     GERD (gastroesophageal reflux disease) 4/09     Headache(784.0)     with recurrent sinusitis and allergies,Dr. Traore ENT      Insomnia      Leiomyoma of uterus, unspecified 2/06    2 small fibroids noted on pelvic us     Low back pain      Menorrhagia 2008    tx'd with ablation     Nausea     weight loss / endoscopy     Nontoxic multinodular goiter     followed by Dr. Raymundo.  Biopsy nl     Other acne     derm Dr. Ventura, started spironolactone 3/08     Perennial allergic rhinitis 2006    gets allergy injections     Unspecified sinusitis (chronic)     takes probiotic- [employer] FORM chronic,  recurrent sinusitis, sinus disease noted on ct, mri     Family History   Problem Relation Age of Onset     Lipids Mother      Hypertension Mother      Hypertension Father      Lipids Father      Blood Disease Father         clotting d/o with factor V leiden-pt negative     Coronary Artery Disease Father      Cerebrovascular Disease Maternal Grandfather      Cancer - colorectal Maternal Grandfather      Current Outpatient Medications   Medication Sig Dispense Refill     Cholecalciferol (VITAMIN D) 1000 UNITS capsule Take 2 capsules by mouth daily        fexofenadine (ALLEGRA) 180 MG tablet Take 1 tablet by mouth daily. 90 tablet 1     Omega-3 Fatty Acids (OMEGA-3 FISH OIL PO) Take by mouth daily       fluconazole (DIFLUCAN) 150 MG tablet Take 150mg (1 tablet) once. May repeat dose in 72hrs if symptoms persist. (Patient not taking: Reported on 6/29/2020) 2 tablet 0     guaiFENesin-codeine (ROBITUSSIN AC) 100-10 MG/5ML solution Take 5-10 mLs by mouth every 4 hours as needed for cough 120 mL 0     lifitegrast (XIIDRA) 5 % opthalmic solution        spironolactone (ALDACTONE) 50 MG tablet Take 50 mg by mouth 2 times daily  0     Social History     Tobacco Use     Smoking status: Never Smoker     Smokeless tobacco: Never Used   Substance Use Topics     Alcohol use: Yes     Alcohol/week: 0.0 standard drinks     Comment: rarely 1/month       OBJECTIVE  BP (!) 151/99   Pulse 99   Temp 97.9  F (36.6  C) (Tympanic)   SpO2 99%     Physical Exam  Constitutional:       General: She is not in acute distress.     Appearance: Normal appearance. She is normal weight. She is not ill-appearing, toxic-appearing or diaphoretic.   HENT:      Head: Normocephalic and atraumatic.   Cardiovascular:      Rate and Rhythm: Normal rate and regular rhythm.      Pulses: Normal pulses.      Heart sounds: Normal heart sounds. No murmur. No friction rub. No gallop.    Pulmonary:      Effort: Pulmonary effort is normal. No respiratory distress.       Breath sounds: Normal breath sounds. No stridor. No wheezing, rhonchi or rales.   Chest:      Chest wall: No tenderness.   Neurological:      General: No focal deficit present.      Mental Status: She is alert and oriented to person, place, and time. Mental status is at baseline.   Psychiatric:         Mood and Affect: Mood normal.         Behavior: Behavior normal.         Thought Content: Thought content normal.         Judgment: Judgment normal.         Labs:  Results for orders placed or performed in visit on 06/29/20 (from the past 24 hour(s))   UA reflex to Microscopic and Culture    Specimen: Midstream Urine   Result Value Ref Range    Color Urine Yellow     Appearance Urine Clear     Glucose Urine Negative NEG^Negative mg/dL    Bilirubin Urine Negative NEG^Negative    Ketones Urine Negative NEG^Negative mg/dL    Specific Gravity Urine 1.020 1.003 - 1.035    Blood Urine Large (A) NEG^Negative    pH Urine 7.5 (H) 5.0 - 7.0 pH    Protein Albumin Urine Negative NEG^Negative mg/dL    Urobilinogen Urine 0.2 0.2 - 1.0 EU/dL    Nitrite Urine Negative NEG^Negative    Leukocyte Esterase Urine Large (A) NEG^Negative    Source Midstream Urine    Urine Microscopic   Result Value Ref Range    WBC Urine 25-50 (A) OTO5^0 - 5 /HPF    RBC Urine 10-25 (A) OTO2^O - 2 /HPF    Squamous Epithelial /LPF Urine Few FEW^Few /LPF    Bacteria Urine Moderate (A) NEG^Negative /HPF         ASSESSMENT/PLAN:      ICD-10-CM    1. Dysuria  R30.0 UA reflex to Microscopic and Culture     Urine Microscopic      (R30.0) Dysuria  (primary encounter diagnosis)  Plan: UA reflex to Microscopic and Culture, Urine         Microscopic, phenazopyridine (AZO URINARY PAIN         RELIEF) 95 MG tablet    (N30.00) Acute cystitis without hematuria  Plan: nitroFURantoin macrocrystal-monohydrate         (MACROBID) 100 MG capsule, phenazopyridine (AZO        URINARY PAIN RELIEF) 95 MG tablet    (R82.90) Nonspecific finding on examination of urine  Plan: Urine  Culture Aerobic Bacterial      Patient should drink 1.5-2 liters of water per day. Okay to use Azo over the counter and/or cranberry juice for symptomatic relief (Note that Azo will cause urine to become orange/red. If you are a contact lens-wearer, contacts may become discolored.This is normal). Okay to continue taking prescribed antibiotic for full course. Patient was advised to return to clinic if symptoms do not improve in the amount of time specified in the AVS or if symptoms worsen. Patient educated on red flag symptoms and asked to go directly to the ED if symptoms present themselves.      Patient given handout on how to prevent UTIs in the future.    Patient was advised to return to clinic if symptoms do not improve in the amount of time specified in the AVS or if symptoms worsen. Patient educated on red flag symptoms and asked to go directly to the ED if symptoms present themselves.     Kali Lou PA-C on 6/29/2020 at 6:35 PM

## 2020-07-01 LAB
BACTERIA SPEC CULT: ABNORMAL
BACTERIA SPEC CULT: ABNORMAL
SPECIMEN SOURCE: ABNORMAL

## 2020-10-21 ENCOUNTER — VIRTUAL VISIT (OUTPATIENT)
Dept: FAMILY MEDICINE | Facility: OTHER | Age: 55
End: 2020-10-21
Payer: COMMERCIAL

## 2020-10-21 PROCEDURE — 99421 OL DIG E/M SVC 5-10 MIN: CPT | Performed by: FAMILY MEDICINE

## 2020-10-21 NOTE — PROGRESS NOTES
"Date: 10/21/2020 08:50:01  Clinician: Joshua Mari  Clinician NPI: [id number]  Patient: Mercedes Patel  Patient : 1965  Patient Address: [address] [place] [id number]  Patient Phone: [phone]  Visit Protocol: UTI  Patient Summary:  Mercedes is a 55 year old ( : 1965 ) female who initiated a [facility] Visit for a presumed bladder infection. When asked the question \"Please sign me up to receive news, health information and promotions from [facility].\", Mercedes responded \"Yes\".   Her symptoms started 1-3 days ago and consist of urinary frequency, urgency, dysuria, and nausea.   Symptom details   Urine color: The color of her urine is yellow.    Denied symptoms include vaginal discharge, flank pain, abdominal pain, chills, urinary incontinence, vomiting, vaginal itching, foul-smelling urine, and feeling as if the bladder is never empty. She does not feel feverish.   Mercedes has not used any over-the-counter medications or home remedies to relieve her current symptoms.  Precipitating events  Mercedes denies having a sexually transmitted disease.  Pertinent medical history  Mercedes has had a bladder infection before and has had 1 in the past 12 months. Her most recent bladder infection was not within the last 4 weeks. Her current symptoms are similar to her previous bladder infection symptoms.   Ciprofloxacin (Cipro) and nitrofurantoin (Macrobid) has been effective in treating her past bladder infections.   She has experienced problems or side effects with the following antibiotics in the past: cephalexin (Keflex) and sulfamethoxazole-trimethoprim (Bactrim DS).  Problems or side effects as reported by the patient (free text): allergy to sulfa, stomach upset with Keflex   Mercedes does not get yeast infections when she takes antibiotics and has not been prescribed antibiotics to prevent frequent or repeated bladder infections in the past.   Mercedes does not have a history of kidney stones. She " has not used a catheter or been a patient in a hospital or nursing home in the past 2 weeks.   Mercedes does not smoke or use smokeless tobacco.   Additional information as reported by the patient (free text): urine is cloudy   Reason for repeat visit for the same protocol within 24 hours:  technical problem, couldn't get payment screen  See the History of referred by protocol and completed visits section for details on previous visits (visits currently in queue to be diagnosed will not appear in this section).    MEDICATIONS: Fish Oil oral, cholecalciferol (vitamin D3) oral, ALLERGIES: erythromycin base, Penicillins, Sulfa (Sulfonamide Antibiotics)  Clinician Response:  Dear Mercedes,  Based on the information you have provided, you likely have an acute urinary tract infection, also called a bladder infection. Bladder infections occur when bacteria from the outside of the body enters the urinary tract. Any part of the urinary system can be infected, but the bladder is the most common.  Medication information  I am prescribing:     Nitrofurantoin monohyd/m-cryst (Macrobid) 100 mg oral capsule. Take 1 capsule by mouth every 12 hours for 5 days. Take this medication with food. There are no refills with this prescription.   The medication I prescribed for your bladder infection is an antibiotic. Continue taking the medication until it is gone even if you feel better.   Yeast infections can be a common side effect of antibiotics. The most common symptom of a yeast infection is itchiness in and around the vagina. Other signs and symptoms include burning, redness, or a thick, white vaginal discharge that looks like cottage cheese and does not have a bad smell.  Self care  Urination helps to flush bacteria from the urinary tract. For this reason, drinking water and urinating often helps relieve some urinary symptoms and can decrease your risk of getting bladder infections in the future.  Other steps you can take to prevent  future bladder infections include:     Wipe front to back after using the bathroom    Urinate after sexual intercourse    Avoid using deodorant sprays, douches, or powders in the vaginal area     When to seek care  Please make an appointment to be seen in a clinic or urgent care if any of the following occur:     You develop new symptoms or your symptoms become worse    You have medication side effects that make it difficult to take them as prescribed    Your symptoms do not improve within 1-2 days of starting treatment    You have symptoms of a bladder infection that return shortly after completing treatment     It is possible to have an allergic reaction to an antibiotic even if you have not had one in the past. If you notice a new rash, significant swelling, or difficulty breathing, stop taking this medication immediately and go to a clinic or urgent care.   Diagnosis: Acute uncomplicated bladder infection  Diagnosis ICD: N39.0  Prescription: nitrofurantoin monohyd/m-cryst (Macrobid) 100 mg oral capsule 10 capsule, 5 days supply. Take 1 capsule by mouth every 12 hours for 5 days. Refills: 0, Refill as needed: no, Allow substitutions: yes  [facility]: [id number] - [phone] - [address], [place] [address]

## 2020-11-22 ASSESSMENT — ENCOUNTER SYMPTOMS
NECK PAIN: 0
BACK PAIN: 1
MYALGIAS: 1
JOINT SWELLING: 0
HOT FLASHES: 1
DIARRHEA: 0
PANIC: 0
INSOMNIA: 1
HEARTBURN: 0
BLOATING: 1
DECREASED CONCENTRATION: 0
BOWEL INCONTINENCE: 0
DEPRESSION: 1
VOMITING: 0
STIFFNESS: 1
ARTHRALGIAS: 1
ABDOMINAL PAIN: 0
BLOOD IN STOOL: 0
CONSTIPATION: 1
MUSCLE WEAKNESS: 0
NAUSEA: 0
MUSCLE CRAMPS: 0
DECREASED LIBIDO: 0
RECTAL PAIN: 0
NERVOUS/ANXIOUS: 0
JAUNDICE: 0

## 2020-11-23 PROBLEM — K57.30 DIVERTICULOSIS OF LARGE INTESTINE: Status: ACTIVE | Noted: 2020-11-23

## 2020-11-23 NOTE — PROGRESS NOTES
PRIMARY CARE CENTER         HPI:       Mercedes Patel is a 55 year old female with PMHx of multiple benign thyroid nodules without toxic multinodular goiter, chronic low back pain with right-sided sciatica, hypertension, and dry eyes who presents for preventive health with evaluation of additional concerns as below.  She was previously followed at by primary care provider in ED with regularity, however, recently moved from to [facility] and wishes to transition care here.    Multiple thyroid nodules  Diagnosed following abnormal exam with thyroid enlargement approximately 25 years ago.  Nodules have been quite stable over the past 2+ decades with multiple prior biopsies that were benign and reassuring.  Last thyroid ultrasound obtained on 10/26/2018 with stable to decreased size of nodules x3.  She has never had any thyroid hormone abnormalities with last check February 2017 with TSH of 0.78.  Overt symptoms of hypo or hyper thyroidism.    Hypertension  Previously diagnosed with hypertension and briefly treated with oral antihypertensives following discontinuation of OCPs which was suspected prior provoking agent.  She has not been on antihypertensives for the last 15 to 20 years.  She does monitor her blood pressure semiregularly at home notes SBP and DBP within target range.  She denies any headache, chest pain or tightness, shortness of breath, palpitations, lightheadedness or dizziness.    Constipation  Chronic bloating and constipation.  She notes variation in bowel movements particularly with reduced water intake that she attributes to constant usage of masks for work.  She does use MiraLAX on occasion.  She eats fruits and vegetables daily.    Last lipid panel in Feb 2017 with total cholesterol 252, HDL 86, .        Routine Health Maintenance  Employment: Works as nurse care coordinator on neurology floor at [employer]  Social risk factors: Limited alcohol use, no high risk sexual  behavior, no drug use, no tobacco  Family risk factors: Hypertension, hyperlipidemia in both parents.  Limited significant history of malignancy and close relatives.  Contraception: Surgical contraception with prior endometrial ablation, vasectomy and   STD screening: No prior  PAP: normal in 2014 with negative HPV co-screen        Lab Results   Component Value Date     PAP NIL 05/09/2014      Mammogram: 2015 no evidence of malignancy, due for repeat mammography  Colonoscopy: 2016-- single polyp diverticulosis, plan for repeat colonoscopy in 10 years  Immunizations: Due for Tdap            Histories:     PAST MEDICAL HISTORY:   Past Medical History:   Diagnosis Date     Acne      Allergic rhinitis      Benign essential hypertension      Chronic fatigue      Constipation      Disorders of bursae and tendons in shoulder region, unspecified     Lt shoulder s/p steroid injection 7/02     GERD (gastroesophageal reflux disease) 04/2009     Headache(784.0)     with recurrent sinusitis and allergies,Dr. Traore ENT      Insomnia      Leiomyoma of uterus, unspecified 02/2006    2 small fibroids noted on pelvic us     Low back pain      Menorrhagia 2008    tx'd with ablation     Nausea     weight loss / endoscopy     Nontoxic multinodular goiter     followed by Dr. Raymundo.  Biopsy nl     Other acne     derm Dr. Ventura, started spironolactone 3/08     Perennial allergic rhinitis 2006    gets allergy injections     Unspecified sinusitis (chronic)     takes probiotic- [employer]ML[employer] FORM chronic, recurrent sinusitis, sinus disease noted on ct, mri       PAST SURGICAL HISTORY:   Past Surgical History:   Procedure Laterality Date     ENDOSCOPY       HC CT MAXILLOFACIAL W/O CONTRAST  01/01/2006    chronic sinus disease     HC MRI BRAIN W/O CONTRAST  01/01/2004    MRI brain pos sinusitis     HC TOOTH EXTRACTION W/FORCEP  1984     HC US SOFT TISSUE HEAD/NECK  2/08, 3/10    thyroid 3 nodules unchanged, f/b endocrine, s/p FNA #4 isthmus  nodule benign 9/10     HYSTEROSCOPY,ABLATION ENDOMETRIUM  01/01/2008    Dr.MacKay ABARCA PELVIS COMPLETE  2/06, 3/08,12/11    fibroids     TONSILLECTOMY       US ABDOMEN LIMITED PORTABLE  01/01/2011    nl       FAMILY HISTORY:   Family History   Problem Relation Age of Onset     Lipids Mother      Hypertension Mother      Hypertension Father      Lipids Father      Blood Disease Father         clotting d/o with factor V leiden-pt negative     Coronary Artery Disease Father         s/p CABG     Cerebrovascular Disease Maternal Grandfather      Cancer - colorectal Maternal Grandfather        SOCIAL HISTORY:   Social History     Tobacco Use     Smoking status: Never Smoker     Smokeless tobacco: Never Used   Substance Use Topics     Alcohol use: Yes     Frequency: 2-3 times a week     Drinks per session: 1 or 2     Comment: rarely 1/month              Medications and Allergies:       MEDICATIONS:   Current Outpatient Medications   Medication Sig Dispense Refill     Cholecalciferol (VITAMIN D) 1000 UNITS capsule Take 2 capsules by mouth daily        fexofenadine (ALLEGRA) 180 MG tablet Take 1 tablet by mouth daily. 90 tablet 1     Omega-3 Fatty Acids (OMEGA-3 FISH OIL PO) Take by mouth daily          ALLERGIES: Erythromycin, Penicillins, Seasonal allergies, and Sulfa drugs         Review of Systems:     ROS  I have personally reviewed and updated the complete ROS on the day of the visit.           Physical Exam:   BP (!) 152/101 (BP Location: Right arm, Patient Position: Sitting, Cuff Size: Adult Regular)   Pulse 73   Wt 62.1 kg (137 lb)   SpO2 100%   Breastfeeding No   BMI 24.27 kg/m    Body mass index is 24.27 kg/m .  Vitals were reviewed       GENERAL APPEARANCE: healthy, alert and no distress     EYES: EOMI, PERRL     HENT: NCAT     NECK: no adenopathy, no asymmetry, masses, or scars and thyroid normal to palpation     RESP: lungs clear to auscultation - no rales, rhonchi or wheezes     CV: regular rates and  rhythm, normal S1 S2, no S3 or S4 and no murmur, click or rub     ABDOMEN:  soft, nontender, no HSM or masses and bowel sounds normal     MS: extremities normal- no gross deformities noted, no evidence of inflammation in joints, FROM in all extremities.     SKIN: no suspicious lesions or rashes     NEURO: Mentation intact and speech normal without dysarthria or aphasia     PSYCH: Affect appropriate, thought content logical and coherent        Results:      Results from the last 24 hoursNo results found for this or any previous visit (from the past 24 hour(s)).  Assessment and Plan     Mercedes was seen today for establish care.    Diagnoses and all orders for this visit:    Preventative health care  -     Mammogram, routine screening; Future    Elevated blood pressure reading without diagnosis of hypertension  Patient to maintain blood pressure log with recordings about 3 times weekly and follow-up in clinic in 2 to 3 months with log.  Will consider initiation of antihypertensives at that point.  -Follow-up in clinic in 2 to 3 months with blood pressure log    Constipation, unspecified constipation type  Diverticulosis of large intestine without hemorrhage  Patient encouraged to increase p.o. water intake and fruit and vegetable intake.  She may continue to use MiraLAX on a as needed basis for treatment of constipation.      Bilateral low back pain with right-sided sciatica, unspecified chronicity  PT referral place. Exercise handout in AVS as well as on paper provided to patient for self-directed regimen in the meantime.   -     PHYSICAL THERAPY REFERRAL; Future    Lipid screening  UPDATE-- continued uptrend in LDL. Will plan to discuss statin on follow-up.  -     Lipid Profile NON-FASTING; Future    Encounter for screening mammogram for breast cancer  -     Mammogram, routine screening; Future    Need for diphtheria-tetanus-pertussis (Tdap) vaccine  Other orders  -     TDAP VACCINE (Adacel, Boostrix)  [id number]    Thyroid nodule  -     TSH with free T4 reflex; Future    Screening for diabetes mellitus  -     Hemoglobin A1c; Future    Vitamin D deficiency  -     Cancel: Vitamin D; Standing  -     Vitamin D Deficiency; Future        Options for treatment and follow-up care were reviewed with the patient. Mercedes Patel engaged in the decision making process and verbalized understanding of the options discussed and agreed with the final plan.    Pt should return to clinic for f/u with me in 2 to 3 months for review blood pressure log and consideration of initiation of antihypertensives.       Genie Lopez MD  PGY-3, Internal Medicine  Nov 24, 2020    Pt was seen and plan of care discussed with Dr. Rico.   While the patient was in clinic, I reviewed the pertinent medical history and results.  I discussed the current findings on physical examination, as well as the patient s diagnosis and treatment plan with the resident and agree with the information as documented with the following exceptions: none.  Gallo Rico MD

## 2020-11-24 ENCOUNTER — OFFICE VISIT (OUTPATIENT)
Dept: INTERNAL MEDICINE | Facility: CLINIC | Age: 55
End: 2020-11-24
Payer: COMMERCIAL

## 2020-11-24 VITALS
SYSTOLIC BLOOD PRESSURE: 152 MMHG | BODY MASS INDEX: 24.27 KG/M2 | WEIGHT: 137 LBS | HEART RATE: 73 BPM | DIASTOLIC BLOOD PRESSURE: 101 MMHG | OXYGEN SATURATION: 100 %

## 2020-11-24 DIAGNOSIS — M54.41 BILATERAL LOW BACK PAIN WITH RIGHT-SIDED SCIATICA, UNSPECIFIED CHRONICITY: ICD-10-CM

## 2020-11-24 DIAGNOSIS — R03.0 ELEVATED BLOOD PRESSURE READING WITHOUT DIAGNOSIS OF HYPERTENSION: ICD-10-CM

## 2020-11-24 DIAGNOSIS — Z13.1 SCREENING FOR DIABETES MELLITUS: ICD-10-CM

## 2020-11-24 DIAGNOSIS — K57.30 DIVERTICULOSIS OF LARGE INTESTINE WITHOUT HEMORRHAGE: ICD-10-CM

## 2020-11-24 DIAGNOSIS — Z00.00 PREVENTATIVE HEALTH CARE: Primary | ICD-10-CM

## 2020-11-24 DIAGNOSIS — Z13.220 LIPID SCREENING: ICD-10-CM

## 2020-11-24 DIAGNOSIS — Z12.31 ENCOUNTER FOR SCREENING MAMMOGRAM FOR BREAST CANCER: ICD-10-CM

## 2020-11-24 DIAGNOSIS — E04.1 THYROID NODULE: ICD-10-CM

## 2020-11-24 DIAGNOSIS — E55.9 VITAMIN D DEFICIENCY: ICD-10-CM

## 2020-11-24 DIAGNOSIS — Z23 NEED FOR DIPHTHERIA-TETANUS-PERTUSSIS (TDAP) VACCINE: ICD-10-CM

## 2020-11-24 DIAGNOSIS — K59.00 CONSTIPATION, UNSPECIFIED CONSTIPATION TYPE: ICD-10-CM

## 2020-11-24 LAB
CHOLEST SERPL-MCNC: 295 MG/DL
DEPRECATED CALCIDIOL+CALCIFEROL SERPL-MC: 38 UG/L (ref 20–75)
HBA1C MFR BLD: 5.5 % (ref 0–5.6)
HDLC SERPL-MCNC: 81 MG/DL
LDLC SERPL CALC-MCNC: 180 MG/DL
NONHDLC SERPL-MCNC: 214 MG/DL
TRIGL SERPL-MCNC: 166 MG/DL
TSH SERPL DL<=0.005 MIU/L-ACNC: 1.17 MU/L (ref 0.4–4)

## 2020-11-24 PROCEDURE — 80061 LIPID PANEL: CPT | Performed by: PATHOLOGY

## 2020-11-24 PROCEDURE — 82306 VITAMIN D 25 HYDROXY: CPT | Mod: 90 | Performed by: PATHOLOGY

## 2020-11-24 PROCEDURE — 99000 SPECIMEN HANDLING OFFICE-LAB: CPT | Performed by: PATHOLOGY

## 2020-11-24 PROCEDURE — 36415 COLL VENOUS BLD VENIPUNCTURE: CPT | Performed by: PATHOLOGY

## 2020-11-24 PROCEDURE — 99396 PREV VISIT EST AGE 40-64: CPT | Mod: GE | Performed by: STUDENT IN AN ORGANIZED HEALTH CARE EDUCATION/TRAINING PROGRAM

## 2020-11-24 PROCEDURE — 84443 ASSAY THYROID STIM HORMONE: CPT | Performed by: PATHOLOGY

## 2020-11-24 PROCEDURE — 83036 HEMOGLOBIN GLYCOSYLATED A1C: CPT | Performed by: PATHOLOGY

## 2020-11-24 SDOH — HEALTH STABILITY: MENTAL HEALTH: HOW MANY STANDARD DRINKS CONTAINING ALCOHOL DO YOU HAVE ON A TYPICAL DAY?: 1 OR 2

## 2020-11-24 SDOH — HEALTH STABILITY: MENTAL HEALTH: HOW OFTEN DO YOU HAVE 6 OR MORE DRINKS ON ONE OCCASION?: NOT ASKED

## 2020-11-24 SDOH — HEALTH STABILITY: MENTAL HEALTH: HOW OFTEN DO YOU HAVE A DRINK CONTAINING ALCOHOL?: 2-3 TIMES A WEEK

## 2020-11-24 ASSESSMENT — PAIN SCALES - GENERAL: PAINLEVEL: NO PAIN (0)

## 2020-11-24 NOTE — PATIENT INSTRUCTIONS
Increase your water intake and take miralax daily as needed for constipation.    An order has been placed for mammogram screening.     We will check some labs including TSH.    Please take you blood pressure several times a week and bring your blood pressure log to your follow-up appointment in 2-3 months.    Primary Care Center Medication Refill Request Information:  * Please contact your pharmacy regarding ANY request for medication refills.  ** [facility] Prescription Fax = [phone]  * Please allow 3 business days for routine medication refills.  * Please allow 5 business days for controlled substance medication refills.     Primary Care Center Test Result notification information:  *You will be notified with in 7-10 days of your appointment day regarding the results of your test.  If you are on MyChart you will be notified as soon as the provider has reviewed the results and signed off on them.    Primary Care Center: [phone]     Your health care Provider has recommended that you receive the new Shingle vaccine called Shingrix to prevent shingles for ages 50 and above. Many private insurance and Medicare Part D plans cover Shingrix. However, not all insurance carriers cover the entire cost of the Shingrix vaccine if the vaccine is administered at your primary care clinic. The clinic cannot determine your insurance benefits.  Please call your insurance carrier prior. The vaccine comes in two doses. Your second dose should be 2-6 months from your first dose.       Prior to receiving the vaccine, we recommend that you call your insurance carrier and ask them the following questions:            1. Is there a cost difference if I receive the vaccine at my doctor's office or a pharmacy?          2. Does my insurance cover the Shingrix Vaccine and administration of the vaccine?          3. What is my co-pay or deductible for the vaccine?        Please call to schedule a Nurse-Visit only at [phone].  Nurse Visit  hours are available Monday, Wednesday, and Friday from 9:00 AM-11:00 AM and 1:00 PM-3:00 PM.       Patient Education     Exercises to Strengthen Your Lower Back  Strong lower back and abdominal muscles work together to support your spine. The exercises below will help strengthen the lower back. It's important that you start exercising slowly and increase levels gradually.   Always start any exercise program with stretching. If you feel pain while doing any of these exercises, stop and talk to your doctor about a more specific exercise program that better suits your condition.    Low back stretch  The point of stretching is to make you more flexible and increase your range of motion. Stretch only as much as you are able. Stretch slowly. Don't push your stretch to the limit. If at any point you feel pain while stretching, this is your (temporary) limit.     Lie on your back with your knees bent and both feet on the ground.    Slowly raise your left knee to your chest as you flatten your lower back against the floor. Hold for 5 seconds.    Relax and repeat the exercise with your right knee.    Do 10 of these exercises for each leg.    Repeat hugging both knees to your chest at the same time.  Building lower back strength  Start your exercise routine with 10 to 30 minutes a day, 1 to 3 times a day.  Initial exercises  Lying on your back:  1. Ankle pumps. Move your foot up and down, towards your head, and then away. Repeat 10 times with each foot.  2. Heel slides. Slowly bend your knee, drawing the heel of your foot towards you. Then slide your heel/foot from you, straightening your knee. Don't lift your foot off the floor (this is not a leg lift).  3. Abdominal contraction. Bend your knees and put your hands on your stomach. Tighten your stomach muscles. Hold for 5 seconds, then relax. Repeat 10 times.  4. Straight leg raise. Bend one leg at the knee and keep the other leg straight. Tighten your stomach muscles. Slowly  lift your straight leg 6 to 12 inches off the floor and hold for up to 5 seconds. Repeat 10 times on each side.  Standin. Wall squats. Stand with your back against the wall. Move your feet about 12 inches away from the wall. Tighten your stomach muscles, and slowly bend your knees until they are at about a 45 degree angle. Don't go down too far. Hold about 5 seconds. Then slowly return to your starting position. Repeat 10 times.  2. Heel raises. Stand facing the wall. Slowly raise the heels of your feet up and down, while keeping your toes on the floor. If you have trouble balancing, you can touch the wall with your hands. Repeat 10 times.  More advanced exercises  When you feel comfortable enough, try these exercises.  1. Kneeling lumbar extension. Start on your hands and knees. At the same time, raise and straighten your right arm and left leg until they are parallel to the ground. Hold for 2 seconds and come back slowly to a starting position. Repeat with left arm and right leg, alternating 10 times.  2. Prone lumbar extension. Lie face down, arms extended overhead, palms on the floor. At the same time, raise your right arm and left leg as high as comfortably possible. Hold for 10 seconds and slowly return to start. Repeat with left arm and right leg, alternating 10 times. Gradually build up to 20 times. (Advanced: Repeat this exercise raising both arms and both legs a few inches off the floor at the same time. Hold for 5 seconds and release.)  3. Pelvic tilt. Lie on the floor on your back with your knees bent at 90 degrees. Your feet should be flat on the floor. Inhale, exhale, then slowly contract your abdominal muscles bringing your navel toward your spine. Let your pelvis rock back until your lower back is flat on the floor. Hold for 10 seconds while breathing smoothly.  4. Abdominal crunch. Perform a pelvic tilt (above) flattening your lower back against the floor. Holding the tension in your abdominal  muscles, take another breath and raise your shoulder blades off the ground (this is not a full sit-up). Keep your head in line with your body (don t bend your neck forward). Hold for 2 seconds, then slowly lower.  [employer] last reviewed this educational content on 8/1/2019 2000-2020 [employer]. [address] [place] [address], [place] [address]. All rights reserved. This information is not intended as a substitute for professional medical care. Always follow your healthcare professional's instructions.         Breast Center ([facility]) [phone] (2nd Floor [facility]) CompleteChecklist in [employer] Text   Breast Center ([facility]) [phone] ([address])

## 2020-11-24 NOTE — NURSING NOTE
Chief Complaint   Patient presents with     Establish Care     pt comes in to establish care today      LORA Xiao at 8:52 AM sign on 11/24/2020

## 2021-04-08 ENCOUNTER — TELEPHONE (OUTPATIENT)
Dept: FAMILY MEDICINE | Facility: CLINIC | Age: 56
End: 2021-04-08

## 2021-04-08 NOTE — TELEPHONE ENCOUNTER
Patient Quality Outreach      Summary:    Patient has the following on her problem list/HM: None    Patient is due/failing the following:   Cervical Cancer Screening - PAP Needed and Annual wellness, date due: 05/24/2018    Type of outreach:    Sent [employer] message. Also called and left a voicemail for the patient to call and scheduled an exam.    Questions for provider review:    None                                                                                                                                     Suzanne Gonzales on 4/8/2021 at 3:03 PM

## 2021-05-19 ENCOUNTER — OFFICE VISIT (OUTPATIENT)
Dept: FAMILY MEDICINE | Facility: CLINIC | Age: 56
End: 2021-05-19
Payer: COMMERCIAL

## 2021-05-19 VITALS
SYSTOLIC BLOOD PRESSURE: 138 MMHG | HEART RATE: 69 BPM | DIASTOLIC BLOOD PRESSURE: 80 MMHG | OXYGEN SATURATION: 97 % | WEIGHT: 141 LBS | TEMPERATURE: 97.9 F | BODY MASS INDEX: 24.98 KG/M2

## 2021-05-19 DIAGNOSIS — N95.1 MENOPAUSAL SYNDROME (HOT FLASHES): ICD-10-CM

## 2021-05-19 DIAGNOSIS — Z82.49 FAMILY HISTORY OF ISCHEMIC HEART DISEASE: ICD-10-CM

## 2021-05-19 DIAGNOSIS — E78.2 MIXED HYPERLIPIDEMIA: Primary | ICD-10-CM

## 2021-05-19 DIAGNOSIS — R03.0 PREHYPERTENSION: ICD-10-CM

## 2021-05-19 LAB
ANION GAP SERPL CALCULATED.3IONS-SCNC: 2 MMOL/L (ref 3–14)
BUN SERPL-MCNC: 12 MG/DL (ref 7–30)
CALCIUM SERPL-MCNC: 9.5 MG/DL (ref 8.5–10.1)
CHLORIDE SERPL-SCNC: 106 MMOL/L (ref 94–109)
CHOLEST SERPL-MCNC: 304 MG/DL
CO2 SERPL-SCNC: 30 MMOL/L (ref 20–32)
CREAT SERPL-MCNC: 0.76 MG/DL (ref 0.52–1.04)
GFR SERPL CREATININE-BSD FRML MDRD: 88 ML/MIN/{1.73_M2}
GLUCOSE SERPL-MCNC: 90 MG/DL (ref 70–99)
HDLC SERPL-MCNC: 72 MG/DL
LDLC SERPL CALC-MCNC: 209 MG/DL
NONHDLC SERPL-MCNC: 232 MG/DL
POTASSIUM SERPL-SCNC: 4.2 MMOL/L (ref 3.4–5.3)
SODIUM SERPL-SCNC: 138 MMOL/L (ref 133–144)
TRIGL SERPL-MCNC: 115 MG/DL

## 2021-05-19 PROCEDURE — 80061 LIPID PANEL: CPT | Performed by: INTERNAL MEDICINE

## 2021-05-19 PROCEDURE — 36415 COLL VENOUS BLD VENIPUNCTURE: CPT | Performed by: INTERNAL MEDICINE

## 2021-05-19 PROCEDURE — 99214 OFFICE O/P EST MOD 30 MIN: CPT | Performed by: INTERNAL MEDICINE

## 2021-05-19 PROCEDURE — 80048 BASIC METABOLIC PNL TOTAL CA: CPT | Performed by: INTERNAL MEDICINE

## 2021-05-19 RX ORDER — MULTIPLE VITAMINS W/ MINERALS TAB 9MG-400MCG
1 TAB ORAL DAILY
COMMUNITY
End: 2021-12-17

## 2021-05-19 RX ORDER — VENLAFAXINE HYDROCHLORIDE 37.5 MG/1
37.5 TABLET, EXTENDED RELEASE ORAL DAILY
Qty: 60 TABLET | Refills: 3 | Status: SHIPPED | OUTPATIENT
Start: 2021-05-19 | End: 2021-12-17

## 2021-05-19 ASSESSMENT — PAIN SCALES - GENERAL: PAINLEVEL: MILD PAIN (2)

## 2021-05-19 NOTE — PROGRESS NOTES
Assessment & Plan     Mixed hyperlipidemia  Her latest LDL was 180.  Her ASCVD score is still low, but her LDL is concerning. We will plan to repeat the lipid panel today (not fasting) and get a CT calcium score.  Reviewed what this is looking for   - CT Coronary Calcium Scan; Future  - Lipid panel reflex to direct LDL Fasting    **LDL is > 200, advised starting crestor 5mg*     Family history of ischemic heart disease  - CT Coronary Calcium Scan; Future    Prehypertension  She will likely need a medication for HTN at some point given her numbers and FH.  She would prefer to not start one now, and will work on diet and exercise over the summer.    - Basic metabolic panel  (Ca, Cl, CO2, Creat, Gluc, K, Na, BUN)    Menopausal syndrome (hot flashes)  Agree that HRT is not a great option for her.  Will try effexor, reviewed importance of taking the same time every day.   - venlafaxine (EFFEXOR-ER) 37.5 MG 24 hr tablet; Take 1 tablet (37.5 mg) by mouth daily Can increase to 75mg after 2 weeks if tolerating      Return in about 6 months (around 11/19/2021) for Physical Exam.    Carlota Polanco MD  [facility]    Anthony Long is a 56 year old who presents for the following health issues     HPI     Mercedes is here to follow-up on hyperlipidemia and elevated blood pressure.  Her LDL at her physical in November was 180.  Her blood pressure at that visit was 152/101.    She has been checking her blood pressure at home, typically gets 130s/80s.  Has not really seen any above 144 systolic.  She has been trying to eat more fruits and vegetables and healthy proteins.  She has not been doing much recently for exercise aside from exercises for her back, but does plan to start biking more regularly now that the weather is nice.  She denies any new headaches, chest pain or pressure, shortness of breath, palpitations, or leg swelling.  She has some intermittent tingling in both of her legs and a  couple times in her left arm down to the fingers.  Not sure if this is related.    Both of her parents have hypertension.  Her father had a CABG in his 80s.      Hypertension Follow-up    Do you check your blood pressure regularly outside of the clinic? Yes     Are you following a low salt diet? Yes    Are your blood pressures ever more than 140 on the top number (systolic) OR more   than 90 on the bottom number (diastolic), for example 140/90? No    How many servings of fruits and vegetables do you eat daily?  2-3    On average, how many sweetened beverages do you drink each day (Examples: soda, juice, sweet tea, etc.  Do NOT count diet or artificially sweetened beverages)?   0    How many days per week do you exercise enough to make your heart beat faster? 7    How many minutes a day do you exercise enough to make your heart beat faster? 30 - 60    How many days per week do you miss taking your medication? 0      She also mentions hot flashes.  She has had them since menopause, but recently they have been getting worse.  She is bothered by hot flashes during the day and the night, and is having difficulty sleeping.  She is not really interested in hormone replacement therapy especially given some of her risk factors, but history of the antidepressants can help with hot flashes.      Review of Systems   Const, cv, pulm, endo, neuro reviewed,  otherwise negative unless noted above.        Objective    /80   Pulse 69   Temp 97.9  F (36.6  C) (Tympanic)   Wt 64 kg (141 lb)   SpO2 97%   BMI 24.98 kg/m    Body mass index is 24.98 kg/m .  Physical Exam   Gen: well appearing, pleasant woman, no distress  Pulm: breathing comfortably, CTAB, no wheezes or rales  CV: RRR, normal S1 and S2, no murmurs  Ext: 2+ distal pulses, no LE edema

## 2021-05-20 ENCOUNTER — MYC MEDICAL ADVICE (OUTPATIENT)
Dept: FAMILY MEDICINE | Facility: CLINIC | Age: 56
End: 2021-05-20

## 2021-05-20 RX ORDER — ROSUVASTATIN CALCIUM 5 MG/1
5 TABLET, COATED ORAL DAILY
Qty: 90 TABLET | Refills: 2 | Status: SHIPPED | OUTPATIENT
Start: 2021-05-20 | End: 2022-02-01

## 2021-05-20 NOTE — TELEPHONE ENCOUNTER
Please review my chart message and advise when pt should have lipids checked again?    Nhung BERMUDEZ RN  EP Triage

## 2021-07-19 ENCOUNTER — PATIENT OUTREACH (OUTPATIENT)
Dept: FAMILY MEDICINE | Facility: CLINIC | Age: 56
End: 2021-07-19

## 2021-07-19 NOTE — TELEPHONE ENCOUNTER
Patient Quality Outreach      Summary:    Patient has the following on her problem list/HM:   Immunizations       Health Maintenance Due   Topic     Diptheria Tetanus Pertussis (DTAP/TDAP/TD) Vaccine (3 - Td or Tdap)         Patient is due/failing the following:   Breast Cancer Screening - Mammogram and Cervical Cancer Screening - PAP Needed    Type of outreach:    Sent letter.    Questions for provider review:    None                                                                                                                                     Mary MARTINEZ CMA       Postponed for 2 weeks.

## 2021-08-05 ENCOUNTER — MYC MEDICAL ADVICE (OUTPATIENT)
Dept: FAMILY MEDICINE | Facility: CLINIC | Age: 56
End: 2021-08-05

## 2021-08-05 DIAGNOSIS — I25.810 CORONARY ARTERY DISEASE INVOLVING AUTOLOGOUS ARTERY CORONARY BYPASS GRAFT WITHOUT ANGINA PECTORIS: Primary | ICD-10-CM

## 2021-08-06 NOTE — TELEPHONE ENCOUNTER
There is a future order signed by Dr Polanco on 2021 and doesn't  until 2022. Please contact [facility] to direct them toward order.

## 2021-08-06 NOTE — TELEPHONE ENCOUNTER
Called [facility], spoke with Mirta, they were able to see order for CT Calcium screening but could not schedule scan based on how it was ordered.     Reordered CT Calcium screening. Mirta confirmed they could see and schedule patient. Mirta will reach out to patient to schedule scan.     Nicole Marks RN

## 2021-09-20 NOTE — TELEPHONE ENCOUNTER
Patient Quality Outreach 2nd Attempt      Summary:    Type of outreach:    Patient is scheduled with provider    Next Steps:  None needed    Max number of attempts reached: Yes. Will try again in 90 days if patient still on fail list.    Questions for provider review:    None                                                                                                                    Mary MARTINEZ [facility]       Closing encounter.

## 2021-10-05 ENCOUNTER — IMMUNIZATION (OUTPATIENT)
Dept: NURSING | Facility: CLINIC | Age: 56
End: 2021-10-05
Payer: COMMERCIAL

## 2021-10-05 PROCEDURE — 0004A PR COVID VAC PFIZER DIL RECON 30 MCG/0.3 ML IM: CPT

## 2021-10-05 PROCEDURE — 91300 PR COVID VAC PFIZER DIL RECON 30 MCG/0.3 ML IM: CPT

## 2021-10-24 ENCOUNTER — HEALTH MAINTENANCE LETTER (OUTPATIENT)
Age: 56
End: 2021-10-24

## 2021-11-01 ENCOUNTER — E-VISIT (OUTPATIENT)
Dept: URGENT CARE | Facility: URGENT CARE | Age: 56
End: 2021-11-01
Payer: COMMERCIAL

## 2021-11-01 DIAGNOSIS — N39.0 ACUTE UTI (URINARY TRACT INFECTION): Primary | ICD-10-CM

## 2021-11-01 PROCEDURE — 99421 OL DIG E/M SVC 5-10 MIN: CPT | Performed by: PHYSICIAN ASSISTANT

## 2021-11-01 RX ORDER — NITROFURANTOIN 25; 75 MG/1; MG/1
100 CAPSULE ORAL 2 TIMES DAILY
Qty: 10 CAPSULE | Refills: 0 | Status: SHIPPED | OUTPATIENT
Start: 2021-11-01 | End: 2021-11-06

## 2021-11-01 NOTE — PATIENT INSTRUCTIONS
Dear Mercedes Patel    After reviewing your responses, I've been able to diagnose you with a urinary tract infection, which is a common infection of the bladder with bacteria.  This is not a sexually transmitted infection, though urinating immediately after intercourse can help prevent infections.  Drinking lots of fluids is also helpful to clear your current infection and prevent the next one.      I have sent a prescription for antibiotics to your pharmacy to treat this infection.    It is important that you take all of your prescribed medication even if your symptoms are improving after a few doses.  Taking all of your medicine helps prevent the symptoms from returning.     If your symptoms worsen, you develop pain in your back or stomach, develop fevers, or are not improving in 5 days, please contact your primary care provider for an appointment or visit any of our convenient Walk-in or Urgent Care Centers to be seen, which can be found on our website here.    Thanks again for choosing us as your health care partner,    Ayanna Prakash

## 2021-12-07 ENCOUNTER — HOSPITAL ENCOUNTER (OUTPATIENT)
Dept: CT IMAGING | Facility: CLINIC | Age: 56
Discharge: HOME | End: 2021-12-07
Attending: INTERNAL MEDICINE | Admitting: INTERNAL MEDICINE
Payer: COMMERCIAL

## 2021-12-07 DIAGNOSIS — I25.810 CORONARY ARTERY DISEASE INVOLVING AUTOLOGOUS ARTERY CORONARY BYPASS GRAFT WITHOUT ANGINA PECTORIS: ICD-10-CM

## 2021-12-07 PROCEDURE — 75571 CT HRT W/O DYE W/CA TEST: CPT | Mod: 26 | Performed by: INTERNAL MEDICINE

## 2021-12-07 PROCEDURE — 75571 CT HRT W/O DYE W/CA TEST: CPT

## 2021-12-16 ASSESSMENT — ENCOUNTER SYMPTOMS
DIZZINESS: 0
DIARRHEA: 0
MYALGIAS: 0
HEMATOCHEZIA: 0
HEMATURIA: 0
HEARTBURN: 1
NAUSEA: 0
WEAKNESS: 0
PALPITATIONS: 0
JOINT SWELLING: 0
HEADACHES: 0
ARTHRALGIAS: 1
NERVOUS/ANXIOUS: 0
SHORTNESS OF BREATH: 0
FREQUENCY: 0
CONSTIPATION: 1
PARESTHESIAS: 0
DYSURIA: 0
COUGH: 0
SORE THROAT: 0
FEVER: 0
CHILLS: 0
BREAST MASS: 0
EYE PAIN: 0
ABDOMINAL PAIN: 0

## 2021-12-17 ENCOUNTER — OFFICE VISIT (OUTPATIENT)
Dept: FAMILY MEDICINE | Facility: CLINIC | Age: 56
End: 2021-12-17
Payer: COMMERCIAL

## 2021-12-17 VITALS
SYSTOLIC BLOOD PRESSURE: 110 MMHG | HEART RATE: 78 BPM | TEMPERATURE: 96.8 F | WEIGHT: 138 LBS | OXYGEN SATURATION: 97 % | BODY MASS INDEX: 24.45 KG/M2 | HEIGHT: 63 IN | DIASTOLIC BLOOD PRESSURE: 62 MMHG

## 2021-12-17 DIAGNOSIS — Z12.4 SCREENING FOR CERVICAL CANCER: ICD-10-CM

## 2021-12-17 DIAGNOSIS — Z12.31 ENCOUNTER FOR SCREENING MAMMOGRAM FOR MALIGNANT NEOPLASM OF BREAST: ICD-10-CM

## 2021-12-17 DIAGNOSIS — Z00.00 ROUTINE GENERAL MEDICAL EXAMINATION AT A HEALTH CARE FACILITY: Primary | ICD-10-CM

## 2021-12-17 DIAGNOSIS — N95.1 MENOPAUSAL SYNDROME (HOT FLASHES): ICD-10-CM

## 2021-12-17 DIAGNOSIS — Z13.0 SCREENING FOR DEFICIENCY ANEMIA: ICD-10-CM

## 2021-12-17 DIAGNOSIS — E78.2 MIXED HYPERLIPIDEMIA: ICD-10-CM

## 2021-12-17 DIAGNOSIS — E04.2 NONTOXIC MULTINODULAR GOITER: ICD-10-CM

## 2021-12-17 PROBLEM — J30.89 PERENNIAL ALLERGIC RHINITIS: Status: RESOLVED | Noted: 2017-02-27 | Resolved: 2021-12-17

## 2021-12-17 LAB
ERYTHROCYTE [DISTWIDTH] IN BLOOD BY AUTOMATED COUNT: 12.4 % (ref 10–15)
HCT VFR BLD AUTO: 46 % (ref 35–47)
HGB BLD-MCNC: 15 G/DL (ref 11.7–15.7)
MCH RBC QN AUTO: 30.2 PG (ref 26.5–33)
MCHC RBC AUTO-ENTMCNC: 32.6 G/DL (ref 31.5–36.5)
MCV RBC AUTO: 93 FL (ref 78–100)
PLATELET # BLD AUTO: 280 10E3/UL (ref 150–450)
RBC # BLD AUTO: 4.97 10E6/UL (ref 3.8–5.2)
WBC # BLD AUTO: 4.1 10E3/UL (ref 4–11)

## 2021-12-17 PROCEDURE — 87624 HPV HI-RISK TYP POOLED RSLT: CPT | Performed by: INTERNAL MEDICINE

## 2021-12-17 PROCEDURE — 99396 PREV VISIT EST AGE 40-64: CPT | Performed by: INTERNAL MEDICINE

## 2021-12-17 PROCEDURE — 84460 ALANINE AMINO (ALT) (SGPT): CPT | Performed by: INTERNAL MEDICINE

## 2021-12-17 PROCEDURE — 99213 OFFICE O/P EST LOW 20 MIN: CPT | Mod: 25 | Performed by: INTERNAL MEDICINE

## 2021-12-17 PROCEDURE — 36415 COLL VENOUS BLD VENIPUNCTURE: CPT | Performed by: INTERNAL MEDICINE

## 2021-12-17 PROCEDURE — 80061 LIPID PANEL: CPT | Performed by: INTERNAL MEDICINE

## 2021-12-17 PROCEDURE — 85027 COMPLETE CBC AUTOMATED: CPT | Performed by: INTERNAL MEDICINE

## 2021-12-17 PROCEDURE — 84443 ASSAY THYROID STIM HORMONE: CPT | Performed by: INTERNAL MEDICINE

## 2021-12-17 PROCEDURE — G0145 SCR C/V CYTO,THINLAYER,RESCR: HCPCS | Performed by: INTERNAL MEDICINE

## 2021-12-17 RX ORDER — LOTEPREDNOL ETABONATE 5 MG/G
GEL OPHTHALMIC
COMMUNITY
Start: 2021-02-15 | End: 2022-10-03

## 2021-12-17 RX ORDER — VENLAFAXINE HYDROCHLORIDE 37.5 MG/1
37.5 CAPSULE, EXTENDED RELEASE ORAL DAILY
Qty: 90 CAPSULE | Refills: 1 | Status: SHIPPED | OUTPATIENT
Start: 2021-12-17 | End: 2021-12-27

## 2021-12-17 ASSESSMENT — ENCOUNTER SYMPTOMS
DYSURIA: 0
NAUSEA: 0
SORE THROAT: 0
HEARTBURN: 1
COUGH: 0
CONSTIPATION: 1
FREQUENCY: 0
EYE PAIN: 0
PALPITATIONS: 0
BREAST MASS: 0
PARESTHESIAS: 0
HEADACHES: 0
ARTHRALGIAS: 1
WEAKNESS: 0
HEMATURIA: 0
ABDOMINAL PAIN: 0
DIZZINESS: 0
SHORTNESS OF BREATH: 0
MYALGIAS: 0
JOINT SWELLING: 0
FEVER: 0
DIARRHEA: 0
CHILLS: 0
HEMATOCHEZIA: 0
NERVOUS/ANXIOUS: 0

## 2021-12-17 ASSESSMENT — PAIN SCALES - GENERAL: PAINLEVEL: NO PAIN (0)

## 2021-12-17 ASSESSMENT — MIFFLIN-ST. JEOR: SCORE: 1178.7

## 2021-12-17 NOTE — PROGRESS NOTES
SUBJECTIVE:   CC: Mercedes Patel is an 56 year old woman who presents for preventive health visit.     Mercedes lives with her  and youngest son, he just graduated from college and is looking for a job.  Her daughter and older son live in the [facility]. She has a 2 1/2 year old granddaughter.  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     We started rosuvastatin about 6 months ago.  She is tolerating it well.  Did have a CT calcium scan done which showed some plaque in the LAD.    Has always tended toward constipation, but it seems to be worse lately.  She is taking MiraLAX pretty much every day.    We started Effexor at her visit about 6 months ago as well.  She is noticing that she is sleeping better and having less hot flashes.      Patient has been advised of split billing requirements and indicates understanding: Yes  Healthy Habits:     Getting at least 3 servings of Calcium per day:  NO    Bi-annual eye exam:  Yes    Dental care twice a year:  Yes    Sleep apnea or symptoms of sleep apnea:  None    Diet:  Low salt    Frequency of exercise:  1 day/week    Duration of exercise:  15-30 minutes    Taking medications regularly:  Yes    Medication side effects:  Not applicable    PHQ-2 Total Score: 0    Additional concerns today:  No    Today's PHQ-2 Score:   PHQ-2 ( 1999 Pfizer) 12/16/2021   Q1: Little interest or pleasure in doing things 0   Q2: Feeling down, depressed or hopeless 0   PHQ-2 Score 0   PHQ-2 Total Score (12-17 Years)- Positive if 3 or more points; Administer PHQ-A if positive -   Q1: Little interest or pleasure in doing things Not at all   Q2: Feeling down, depressed or hopeless Not at all   PHQ-2 Score 0       Abuse: Current or Past (Physical, Sexual or Emotional) - No  Do you feel safe in your environment? Yes    Have you ever done Advance Care Planning? (For example, a Health Directive, POLST, or a discussion with a medical provider or your loved  ones about your wishes): No    Social History     Tobacco Use     Smoking status: Never Smoker     Smokeless tobacco: Never Used   Substance Use Topics     Alcohol use: Yes     Comment: rarely 1/month         Alcohol Use 12/16/2021   Prescreen: >3 drinks/day or >7 drinks/week? No   Prescreen: >3 drinks/day or >7 drinks/week? -       Reviewed orders with patient.  Reviewed health maintenance and updated orders accordingly - Yes  Patient Active Problem List   Diagnosis     Nontoxic multinodular goiter     Leiomyoma of uterus     History of chronic sinusitis     Chronic right-sided low back pain with right-sided sciatica     Adult acne     Diverticulosis of large intestine     Mixed hyperlipidemia     Menopausal syndrome (hot flashes)     Past Surgical History:   Procedure Laterality Date     ENDOSCOPY       HC CT MAXILLOFACIAL W/O CONTRAST  01/01/2006    chronic sinus disease     HC MRI BRAIN W/O CONTRAST  01/01/2004    MRI brain pos sinusitis     HC TOOTH EXTRACTION W/FORCEP  1984     HC US SOFT TISSUE HEAD/NECK  2/08, 3/10    thyroid 3 nodules unchanged, f/b endocrine, s/p FNA #4 isthmus nodule benign 9/10     HYSTEROSCOPY,ABLATION ENDOMETRIUM  01/01/2008         SONO PELVIS COMPLETE  2/06, 3/08,12/11    fibroids     TONSILLECTOMY       US ABDOMEN LIMITED PORTABLE  01/01/2011    nl       Social History     Tobacco Use     Smoking status: Never Smoker     Smokeless tobacco: Never Used   Substance Use Topics     Alcohol use: Yes     Comment: rarely 1/month     Family History   Problem Relation Age of Onset     Lipids Mother      Hypertension Mother      Hypertension Father      Lipids Father      Blood Disease Father         clotting d/o with factor V leiden-pt negative     Coronary Artery Disease Father         s/p CABG     Cerebrovascular Disease Maternal Grandfather      Cancer - colorectal Maternal Grandfather            Breast Cancer Screening:    Breast CA Risk Assessment (FHS-7) 12/16/2021   Do you  "have a family history of breast, colon, or ovarian cancer? No / Unknown         Mammogram Screening: Recommended mammography every 1-2 years with patient discussion and risk factor consideration  Pertinent mammograms are reviewed under the imaging tab.    History of abnormal Pap smear: NO - age 30-65 PAP every 5 years with negative HPV co-testing recommended  PAP / HPV 5/9/2014 11/30/2011 9/23/2009   PAP (Historical) NIL NIL NIL     Reviewed and updated as needed this visit by clinical staff  Tobacco  Allergies  Meds             Reviewed and updated as needed this visit by Provider                   Review of Systems   Constitutional: Negative for chills and fever.   HENT: Negative for congestion, ear pain, hearing loss and sore throat.    Eyes: Negative for pain and visual disturbance.   Respiratory: Negative for cough and shortness of breath.    Cardiovascular: Negative for chest pain, palpitations and peripheral edema.   Gastrointestinal: Positive for constipation and heartburn. Negative for abdominal pain, diarrhea, hematochezia and nausea.   Breasts:  Negative for tenderness, breast mass and discharge.   Genitourinary: Negative for dysuria, frequency, genital sores, hematuria, pelvic pain, urgency, vaginal bleeding and vaginal discharge.   Musculoskeletal: Positive for arthralgias. Negative for joint swelling and myalgias.   Skin: Negative for rash.   Neurological: Negative for dizziness, weakness, headaches and paresthesias.   Psychiatric/Behavioral: Negative for mood changes. The patient is not nervous/anxious.           OBJECTIVE:   /62   Pulse 78   Temp 96.8  F (36  C) (Tympanic)   Ht 1.59 m (5' 2.6\")   Wt 62.6 kg (138 lb)   SpO2 97%   BMI 24.76 kg/m    Physical Exam  GENERAL APPEARANCE: healthy, alert and no distress  EYES: Eyes grossly normal to inspection, PERRL and conjunctivae and sclerae normal  HENT: ear canals and TM's normal, mouth without ulcers or lesions, oropharynx clear and oral " mucous membranes moist  NECK: no adenopathy, no asymmetry, masses, or scars and thyroid normal to palpation  RESP: lungs clear to auscultation - no rales, rhonchi or wheezes  BREAST: normal without masses, tenderness or nipple discharge and no palpable axillary masses or adenopathy  CV: regular rate and rhythm, normal S1 S2, no S3 or S4, no murmur, click or rub, no peripheral edema and peripheral pulses strong  ABDOMEN: soft, nontender, no hepatosplenomegaly, no masses and bowel sounds normal   (female): normal female external genitalia, normal urethral meatus, vaginal mucosal atrophy noted, normal cervix  MS: no musculoskeletal defects are noted and gait is age appropriate without ataxia  SKIN: no suspicious lesions or rashes  NEURO: Normal strength and tone, sensory exam grossly normal, mentation intact and speech normal  PSYCH: mentation appears normal and affect normal/bright        ASSESSMENT/PLAN:   (Z00.00) Routine general medical examination at a health care facility  (primary encounter diagnosis)  Comment:   Due for mammogram   Pap done today  She would prefer to get shingrix another time   colonoscopy due 2026  Glucose checked in May, wnl.     (E78.2) Mixed hyperlipidemia  Comment: will see where her LDL is today since starting crestor about 6 months ago   Plan: Lipid panel reflex to direct LDL Fasting, ALT            (N95.1) Menopausal syndrome (hot flashes)  Comment: improved with effexor, but when asked she does think the worsening constipation may have correlated with starting this medication.  Will try a lower dose to see if that improves constipation and still is effective for hot flashes  Plan: venlafaxine (EFFEXOR-XR) 37.5 MG 24 hr capsule            (E04.2) Nontoxic multinodular goiter  Comment: likes to monitor every year or two   Plan: TSH with free T4 reflex            (Z12.31) Encounter for screening mammogram for malignant neoplasm of breast  Comment:   Plan: MA SCREENING DIGITAL BILAT -  "Future  (s+30)            (Z13.0) Screening for deficiency anemia  Comment:   Plan: CBC with platelets            (Z12.4) Screening for cervical cancer  Comment:   Plan: Pap Screen with HPV - recommended age 30 - 65         years                  COUNSELING:  Reviewed preventive health counseling, as reflected in patient instructions       Regular exercise       Healthy diet/nutrition       Osteoporosis prevention/bone health       (Nilsa)menopause management    Estimated body mass index is 24.76 kg/m  as calculated from the following:    Height as of this encounter: 1.59 m (5' 2.6\").    Weight as of this encounter: 62.6 kg (138 lb).        She reports that she has never smoked. She has never used smokeless tobacco.      Counseling Resources:  ATP IV Guidelines  Pooled Cohorts Equation Calculator  Breast Cancer Risk Calculator  BRCA-Related Cancer Risk Assessment: FHS-7 Tool  FRAX Risk Assessment  ICSI Preventive Guidelines  Dietary Guidelines for Americans, 2010  USDA's MyPlate  ASA Prophylaxis  Lung CA Screening    Carlota Polanco MD  [facility]  "

## 2021-12-18 LAB
ALT SERPL W P-5'-P-CCNC: 35 U/L (ref 0–50)
TSH SERPL DL<=0.005 MIU/L-ACNC: 0.88 MU/L (ref 0.4–4)

## 2021-12-21 LAB
CHOLEST SERPL-MCNC: 165 MG/DL
FASTING STATUS PATIENT QL REPORTED: YES
HDLC SERPL-MCNC: 83 MG/DL
LDLC SERPL CALC-MCNC: 64 MG/DL
NONHDLC SERPL-MCNC: 82 MG/DL
TRIGL SERPL-MCNC: 90 MG/DL

## 2021-12-22 LAB
BKR LAB AP GYN ADEQUACY: NORMAL
BKR LAB AP GYN INTERPRETATION: NORMAL
BKR LAB AP HPV REFLEX: NORMAL
BKR LAB AP PREVIOUS ABNORMAL: NORMAL
PATH REPORT.COMMENTS IMP SPEC: NORMAL
PATH REPORT.COMMENTS IMP SPEC: NORMAL
PATH REPORT.RELEVANT HX SPEC: NORMAL

## 2021-12-24 LAB
HUMAN PAPILLOMA VIRUS 16 DNA: NEGATIVE
HUMAN PAPILLOMA VIRUS 18 DNA: NEGATIVE
HUMAN PAPILLOMA VIRUS FINAL DIAGNOSIS: NORMAL
HUMAN PAPILLOMA VIRUS OTHER HR: NEGATIVE

## 2022-01-29 DIAGNOSIS — E78.2 MIXED HYPERLIPIDEMIA: ICD-10-CM

## 2022-02-01 RX ORDER — ROSUVASTATIN CALCIUM 5 MG/1
5 TABLET, COATED ORAL DAILY
Qty: 90 TABLET | Refills: 2 | Status: SHIPPED | OUTPATIENT
Start: 2022-02-01 | End: 2022-10-03

## 2022-04-10 ENCOUNTER — HEALTH MAINTENANCE LETTER (OUTPATIENT)
Age: 57
End: 2022-04-10

## 2022-07-01 DIAGNOSIS — N95.1 MENOPAUSAL SYNDROME (HOT FLASHES): ICD-10-CM

## 2022-07-05 RX ORDER — VENLAFAXINE HYDROCHLORIDE 75 MG/1
75 CAPSULE, EXTENDED RELEASE ORAL DAILY
Qty: 90 CAPSULE | Refills: 0 | Status: SHIPPED | OUTPATIENT
Start: 2022-07-05 | End: 2022-10-03

## 2022-07-05 NOTE — TELEPHONE ENCOUNTER
Routing refill request to provider for review/approval because:  Labs not current:    Creatinine   Date Value Ref [place] Status   05/19/2021 0.76 0.52 - 1.04 mg/dL Final     Previous Dr. Polanco patient-patient needs to establish care with new PCP    Irma Valladares RN

## 2022-07-05 NOTE — TELEPHONE ENCOUNTER
Please have her to set up and establish new PCP within next 3 months. 90 days supplies sent    thx

## 2022-07-06 NOTE — TELEPHONE ENCOUNTER
Spoke to pt, states she has moved and is not as close to [place] as she use to be.  She has been advised that appt is needed prior to more refills.Mihir STEWART, CMA

## 2022-10-03 ENCOUNTER — OFFICE VISIT (OUTPATIENT)
Dept: FAMILY MEDICINE | Facility: CLINIC | Age: 57
End: 2022-10-03
Payer: COMMERCIAL

## 2022-10-03 VITALS
SYSTOLIC BLOOD PRESSURE: 134 MMHG | BODY MASS INDEX: 23.21 KG/M2 | HEIGHT: 63 IN | HEART RATE: 70 BPM | WEIGHT: 131 LBS | TEMPERATURE: 97.3 F | DIASTOLIC BLOOD PRESSURE: 88 MMHG | OXYGEN SATURATION: 97 %

## 2022-10-03 DIAGNOSIS — E78.2 MIXED HYPERLIPIDEMIA: ICD-10-CM

## 2022-10-03 DIAGNOSIS — N95.1 MENOPAUSAL SYNDROME (HOT FLASHES): Primary | ICD-10-CM

## 2022-10-03 DIAGNOSIS — Z23 NEED FOR VACCINATION: ICD-10-CM

## 2022-10-03 PROCEDURE — 99214 OFFICE O/P EST MOD 30 MIN: CPT | Performed by: PHYSICIAN ASSISTANT

## 2022-10-03 PROCEDURE — 91312 COVID-19,PF,PFIZER BOOSTER BIVALENT: CPT | Performed by: PHYSICIAN ASSISTANT

## 2022-10-03 PROCEDURE — 0124A COVID-19,PF,PFIZER BOOSTER BIVALENT: CPT | Performed by: PHYSICIAN ASSISTANT

## 2022-10-03 RX ORDER — VENLAFAXINE HYDROCHLORIDE 75 MG/1
75 CAPSULE, EXTENDED RELEASE ORAL DAILY
Qty: 90 CAPSULE | Refills: 3 | Status: SHIPPED | OUTPATIENT
Start: 2022-10-03 | End: 2023-09-29

## 2022-10-03 RX ORDER — ROSUVASTATIN CALCIUM 5 MG/1
5 TABLET, COATED ORAL DAILY
Qty: 90 TABLET | Refills: 3 | Status: SHIPPED | OUTPATIENT
Start: 2022-10-03 | End: 2023-09-29

## 2022-10-03 ASSESSMENT — PAIN SCALES - GENERAL: PAINLEVEL: NO PAIN (0)

## 2022-10-03 NOTE — PROGRESS NOTES
"  Assessment & Plan       ICD-10-CM    1. Menopausal syndrome (hot flashes)  N95.1 venlafaxine (EFFEXOR XR) 75 MG 24 hr capsule   2. Mixed hyperlipidemia  E78.2 rosuvastatin (CRESTOR) 5 MG tablet   3. Need for vaccination  Z23 COVID-19,PF,PFIZER BOOSTER BIVALENT 12+Yrs     Patient is tolerating current medication without any major side effects or concerns. Continue current treatment without any changes. Refills as above. No longer living in  area, so plans to establish care with provider in [facility] at next OV.  Vaccines updated as above. Due for mammogram, patient reminded to schedule ASAP.    Return in about 1 year (around 10/3/2023) for Annual exam.    [facility]    Anthony Long is a 57 year old, presenting for the following health issues:  Medication Refill      History of Present Illness       Reason for visit:  Medication follow up Venlafexinr    She eats 4 or more servings of fruits and vegetables daily.She consumes 0 sweetened beverage(s) daily.She exercises with enough effort to increase her heart rate 9 or less minutes per day.  She exercises with enough effort to increase her heart rate 3 or less days per week.   She is taking medications regularly.     - Previously seen by Dr Polanco, who has left our practice. Takes venlafaxine for hot flashes, insomnia; working well. Also on daily statin for HLP; lipid panel from 12/2021 reviewed, excellent control.    Review of Systems   Constitutional, HEENT, cardiovascular, pulmonary, GI, , musculoskeletal, neuro, skin, endocrine and psych systems are negative, except as otherwise noted.      Objective    /88   Pulse 70   Temp 97.3  F (36.3  C) (Temporal)   Ht 1.59 m (5' 2.6\")   Wt 59.4 kg (131 lb)   SpO2 97%   BMI 23.50 kg/m    Body mass index is 23.5 kg/m .  Physical Exam   GENERAL:  WDWN, no acute distress  PSYCH: pleasant, cooperative  EYES: no discharge, no injection  HENT:  Normocephalic. " Moist mucus membranes.  NECK:  Supple, symmetric  LUNGS:  Clear to auscultation bilaterally without rhonchi, rales, or wheeze. Chest rise symmetric and no tenderness to palpation.  HEART:  Regular rate & rhythm. No murmur, gallop, or rub.  EXTREMITIES:  No gross deformities, moves all 4 limbs spontaneously, no peripheral edema  SKIN:  Warm and dry, no rash or suspicious lesions    NEUROLOGIC: alert, sensation grossly intact.

## 2023-03-23 ENCOUNTER — TELEPHONE (OUTPATIENT)
Dept: FAMILY MEDICINE | Facility: CLINIC | Age: 58
End: 2023-03-23
Payer: COMMERCIAL

## 2023-03-23 NOTE — TELEPHONE ENCOUNTER
Patient Quality Outreach    Patient is due for the following:   Breast Cancer Screening - Mammogram  Physical Preventive Adult Physical    Next Steps:   Schedule a Adult Preventative    Type of outreach:    Sent [employer] message.      Questions for provider review:    Pt is aware of scheduling     Josephine Cuevas

## 2023-03-26 ENCOUNTER — HEALTH MAINTENANCE LETTER (OUTPATIENT)
Age: 58
End: 2023-03-26

## 2023-08-13 ENCOUNTER — E-VISIT (OUTPATIENT)
Dept: URGENT CARE | Facility: CLINIC | Age: 58
End: 2023-08-13
Payer: COMMERCIAL

## 2023-08-13 DIAGNOSIS — Z20.822 SUSPECTED COVID-19 VIRUS INFECTION: Primary | ICD-10-CM

## 2023-08-13 PROCEDURE — 99207 PR NON-BILLABLE SERV PER CHARTING: CPT | Performed by: NURSE PRACTITIONER

## 2023-08-13 NOTE — PATIENT INSTRUCTIONS
Dear Mercedes,    Based on your responses, you may have COVID-19. This illness can cause fever, cough and trouble breathing. Many people get a mild case and get better on their own. Some people can get very sick.    Will I be tested for COVID-19?  We would like to test you for COVID-19 virus. I have placed orders for this test.     For all employees or close contacts (except [place] and [place] - see below), go to your [employer] home page and scroll down to the section that says  You have an appointment that needs to be scheduled  and click the large green button that says  Schedule Now  and follow the steps to find the next available opening.     If you are unable to complete these steps or if you cannot find any available times, please call [phone] to schedule employee testing.     [place] employees or close contacts, please call [phone].   [place] ([employer]) employees or close contacts call [phone].    Return to work guidance:  Please let your workplace manager and staffing office know when your isolation ends. Note: if you tested through [facility], there is no need to report to EOHS. If you did not test through EOHS, send a copy of your results to [email or web address]. [place] call [phone], [place] call [phone].     Please visit the Employee COVID-19 Testing Information page on the COVID-19 SharePoint site. Here you will find return to work and testing guidance, high and low risk exposure definitions, and frequently asked questions.   [employer] URL: [email or web address]     How can I take care of myself?  Over the counter medications may help with your symptoms such as runny or stuffy nose, cough, chills, or fever.  Talk to your care team about your options.     Some people are at high risk of severe illness (for example, you have a weak immune system, you re 65 years or older, or you  have certain medical problems). If your risk is high and your symptoms started in the last 5 days, we strongly recommend for you to get COVID treatment as soon as possible. Paxlovid and Molnupiravir are proven safe and effective, make you feel better faster, and prevent hospitalization and death.       To schedule an appointment to discuss COVID treatment, request an appointment on [employer] (select  COVID-19 Treatment ) or call [address] [place] [address] ([phone])      Get lots of rest. Drink extra fluids (unless a doctor has told you not to)    Take Tylenol (acetaminophen) or ibuprofen for fever or pain. If you have liver or kidney problems, ask your family doctor if it's okay to take Tylenol o ibuprofen    Take over the counter medications for your symptoms, as directed by your doctor. You may also talk to your pharmacist.      If you have other health problems (like cancer, heart failure, an organ transplant or severe kidney disease): Call your specialty clinic if you don't feel better in the next 2 days.    Know when to call 911. Emergency warning signs include:  o Trouble breathing or shortness of breath  o Pain or pressure in the chest that doesn't go away  o Feeling confused like you haven't felt before, or not being able to wake up  o Bluish-colored lips or face    Where can I get more information?    [facility] - About COVID-19: [email or web address]     CDC - What to Do If You're Sick: www.cdc.gov/coronavirus/2019-ncov/about/steps-when-sick.html    CDC -  Isolation https://www.cdc.gov/coronavirus/2019-ncov/your-health/isolation.html

## 2023-08-14 ENCOUNTER — LAB (OUTPATIENT)
Dept: URGENT CARE | Facility: URGENT CARE | Age: 58
End: 2023-08-14
Attending: NURSE PRACTITIONER
Payer: COMMERCIAL

## 2023-08-14 DIAGNOSIS — Z20.822 SUSPECTED COVID-19 VIRUS INFECTION: ICD-10-CM

## 2023-08-14 PROCEDURE — 87635 SARS-COV-2 COVID-19 AMP PRB: CPT

## 2023-08-15 LAB — SARS-COV-2 RNA RESP QL NAA+PROBE: NEGATIVE

## 2023-08-16 ENCOUNTER — NURSE TRIAGE (OUTPATIENT)
Dept: FAMILY MEDICINE | Facility: CLINIC | Age: 58
End: 2023-08-16
Payer: COMMERCIAL

## 2023-08-16 ENCOUNTER — MYC MEDICAL ADVICE (OUTPATIENT)
Dept: FAMILY MEDICINE | Facility: CLINIC | Age: 58
End: 2023-08-16
Payer: COMMERCIAL

## 2023-08-16 DIAGNOSIS — U07.1 INFECTION DUE TO 2019 NOVEL CORONAVIRUS: Primary | ICD-10-CM

## 2023-08-16 NOTE — TELEPHONE ENCOUNTER
Positive COVID tests should be sent to RN pool to determine eligibility for PAXLOVID, please forward

## 2023-08-16 NOTE — TELEPHONE ENCOUNTER
RN COVID TREATMENT VISIT  08/16/23      The patient has been triaged and does not require a higher level of care.    Mercedes Patel  58 year old  Current weight? 131 lbs    Has the patient been seen by a primary care provider at an [facility] or [facility] within the past two years? Yes.   Have you been in close proximity to/do you have a known exposure to a person with a confirmed case of influenza? No.     General treatment eligibility:  Date of positive COVID test (PCR or at home)?  8/16/23    Are you or have you been hospitalized for this COVID-19 infection? No.   Have you received monoclonal antibodies or antiviral treatment for COVID-19 since this positive test? No.   Do you have any of the following conditions that place you at risk of being very sick from COVID-19?   - Age 50 years or older  Yes, patient has at least one high risk condition as noted above.     Current COVID symptoms:   - headache  - sore throat  Yes. Patient has at least one symptom as selected.     How many days since symptoms started? 5 days or less. Established patient, 12 years or older weighing at least 88.2 lbs, who has symptoms that started in the past 5 days, has not been hospitalized nor received treatment already, and is at risk for being very sick from COVID-19.     Treatment eligibility by RN:  Are you currently pregnant or nursing? No  Do you have a clinically significant hypersensitivity to nirmatrelvir or ritonavir, or toxic epidermal necrolysis (TEN) or Law-Jd Syndrome? No  Do you have a history of hepatitis, any hepatic impairment on the Problem List (such as Child-Marcano Class C, cirrhosis, fatty liver disease, alcoholic liver disease), or was the last liver lab (hepatic panel, ALT, AST, ALK Phos, bilirubin) elevated in the past 6 months? No  Do you have any history of severe renal impairment (eGFR < 30mL/min)? No    Is patient eligible to continue? Yes, patient meets all eligibility  requirements for the RN COVID treatment (as denoted by all no responses above).     Current Outpatient Medications   Medication Sig Dispense Refill    Cholecalciferol (VITAMIN D) 1000 UNITS capsule Take 2 capsules by mouth daily       rosuvastatin (CRESTOR) 5 MG tablet Take 1 tablet (5 mg) by mouth daily 90 tablet 3    venlafaxine (EFFEXOR XR) 75 MG 24 hr capsule Take 1 capsule (75 mg) by mouth daily 90 capsule 3       Medications from List 1 of the standing order (on medications that exclude the use of Paxlovid) that patient is taking: NONE. Is patient taking [place]'s Wort? No  Is patient taking [place]'s Wort or any meds from List 1? No.   Medications from List 2 of the standing order (on meds that provider needs to adjust) that patient is taking: NONE. Is patient on any of the meds from List 2? No.   Medications from List 3 of standing order (on meds that a RN needs to adjust) that patient is taking: rosuvastatin (Crestor): Instructed patient to stop rosuvastatin while taking Paxlovid and restart rosuvastatin 1 day after the completion of Paxlovid.  Is patient on any meds from List 3? Yes. Patient is on meds from list 3. No meds require a provider visit and at least one med required RN to adjust.     Paxlovid has an approximate 90% reduction in hospitalization. Paxlovid can possibly cause altered sense of taste, diarrhea (loose, watery stools), high blood pressure, muscle aches.     Would patient like a Paxlovid prescription?   Yes.   Lab Results   Component Value Date    GFRESTIMATED 88 05/19/2021       Was last eGFR reduced? No, eGFR 60 or greater/ No Result on record. Patient can receive the normal renal function dose. Paxlovid Rx sent to [facility]   [facility]   [phone]  [address] [place] [address]    Hours:  Mon-Fri: 8:30a - 6:00p  Sat-Sun: 8:30a - 12:30p    Curbside Delivery: Patient to call [phone] to set up and  at door 2 of [facility]    Temporary change to  home medications: See above    All medication adjustments (holds, etc) were discussed with the patient and patient was asked to repeat back (teachback) their med adjustment.  Did patient understand med adjustment? Yes, patient repeated back and understood correctly.        Reviewed the following instructions with the patient:    Paxlovid (nimatrelvir and ritonavir)    How it works  Two medicines (nirmatrelvir and ritonavir) are taken together. They stop the virus from growing. Less amount of virus is easier for your body to fight.    How to take  Medicine comes in a daily container with both medicine tablets. Take by mouth twice daily (once in the morning, once at night) for 5 days.  The number of tablets to take varies by patient.  Don't chew or break capsules. Swallow whole.    When to take  Take as soon as possible after positive COVID-19 test result, and within 5 days of your first symptoms.    Possible side effects  Can cause altered sense of taste, diarrhea (loose, watery stools), high blood pressure, muscle aches.    Amanda Guaman RN          Reason for Disposition   [1] HIGH RISK for severe COVID complications (e.g., weak immune system, age > 64 years, obesity with BMI > 25, pregnant, chronic lung disease or other chronic medical condition) AND [2] COVID symptoms (e.g., cough, fever)  (Exceptions: Already seen by PCP and no new or worsening symptoms.)    Additional Information   Negative: SEVERE difficulty breathing (e.g., struggling for each breath, speaks in single words)   Negative: Difficult to awaken or acting confused (e.g., disoriented, slurred speech)   Negative: Bluish (or gray) lips or face now   Negative: Shock suspected (e.g., cold/pale/clammy skin, too weak to stand, low BP, rapid pulse)   Negative: Sounds like a life-threatening emergency to the triager   Negative: SEVERE or constant chest pain or pressure  (Exception: Mild central chest pain, present only when coughing.)   Negative: MODERATE  difficulty breathing (e.g., speaks in phrases, SOB even at rest, pulse 100-120)   Negative: [1] Headache AND [2] stiff neck (can't touch chin to chest)   Negative: Oxygen level (e.g., pulse oximetry) 90 percent or lower   Negative: Chest pain or pressure   Negative: Patient sounds very sick or weak to the triager   Negative: MILD difficulty breathing (e.g., minimal/no SOB at rest, SOB with walking, pulse <100)   Negative: Fever > 103 F (39.4 C)   Negative: [1] Fever > 101 F (38.3 C) AND [2] age > 60 years   Negative: [1] Fever > 100.0 F (37.8 C) AND [2] bedridden (e.g., nursing home patient, CVA, chronic illness, recovering from surgery)    Protocols used: Coronavirus (COVID-19) Diagnosed or [id number]

## 2023-08-16 NOTE — TELEPHONE ENCOUNTER
Provider, please read the patient My Chart message and advise the triage staff.       Patient has Covid e-visit 8/13/2023. Tested positive today on home test.       Anjelica Mittal RN  [facility]

## 2023-09-19 RX ORDER — LOTEPREDNOL ETABONATE 5 MG/ML
SUSPENSION/ DROPS OPHTHALMIC
COMMUNITY
Start: 2023-03-06

## 2023-09-24 ASSESSMENT — ENCOUNTER SYMPTOMS
CONSTIPATION: 1
SORE THROAT: 1
BREAST MASS: 0

## 2023-09-29 ENCOUNTER — OFFICE VISIT (OUTPATIENT)
Dept: FAMILY MEDICINE | Facility: CLINIC | Age: 58
End: 2023-09-29
Payer: COMMERCIAL

## 2023-09-29 VITALS
DIASTOLIC BLOOD PRESSURE: 96 MMHG | WEIGHT: 133 LBS | BODY MASS INDEX: 23.57 KG/M2 | RESPIRATION RATE: 15 BRPM | OXYGEN SATURATION: 96 % | HEART RATE: 73 BPM | TEMPERATURE: 97.4 F | SYSTOLIC BLOOD PRESSURE: 153 MMHG | HEIGHT: 63 IN

## 2023-09-29 DIAGNOSIS — Z00.00 ROUTINE GENERAL MEDICAL EXAMINATION AT A HEALTH CARE FACILITY: Primary | ICD-10-CM

## 2023-09-29 DIAGNOSIS — Z23 NEED FOR TDAP VACCINATION: ICD-10-CM

## 2023-09-29 DIAGNOSIS — I10 BENIGN ESSENTIAL HYPERTENSION WITH TARGET BLOOD PRESSURE BELOW 140/90: ICD-10-CM

## 2023-09-29 DIAGNOSIS — N95.1 MENOPAUSAL SYNDROME (HOT FLASHES): ICD-10-CM

## 2023-09-29 DIAGNOSIS — K59.09 CHRONIC CONSTIPATION: ICD-10-CM

## 2023-09-29 DIAGNOSIS — E78.2 MIXED HYPERLIPIDEMIA: ICD-10-CM

## 2023-09-29 DIAGNOSIS — Z23 NEED FOR HEPATITIS A VACCINATION: ICD-10-CM

## 2023-09-29 DIAGNOSIS — Z12.31 VISIT FOR SCREENING MAMMOGRAM: ICD-10-CM

## 2023-09-29 LAB
ALBUMIN SERPL BCG-MCNC: 4.8 G/DL (ref 3.5–5.2)
ALP SERPL-CCNC: 95 U/L (ref 35–104)
ALT SERPL W P-5'-P-CCNC: 12 U/L (ref 0–50)
ANION GAP SERPL CALCULATED.3IONS-SCNC: 11 MMOL/L (ref 7–15)
AST SERPL W P-5'-P-CCNC: 23 U/L (ref 0–45)
BILIRUB SERPL-MCNC: 0.4 MG/DL
BUN SERPL-MCNC: 14 MG/DL (ref 6–20)
CALCIUM SERPL-MCNC: 9.9 MG/DL (ref 8.6–10)
CHLORIDE SERPL-SCNC: 102 MMOL/L (ref 98–107)
CHOLEST SERPL-MCNC: 173 MG/DL
CREAT SERPL-MCNC: 0.76 MG/DL (ref 0.51–0.95)
CREAT UR-MCNC: 29.9 MG/DL
DEPRECATED HCO3 PLAS-SCNC: 25 MMOL/L (ref 22–29)
EGFRCR SERPLBLD CKD-EPI 2021: 90 ML/MIN/1.73M2
GLUCOSE SERPL-MCNC: 98 MG/DL (ref 70–99)
HDLC SERPL-MCNC: 84 MG/DL
LDLC SERPL CALC-MCNC: 71 MG/DL
MICROALBUMIN UR-MCNC: <12 MG/L
MICROALBUMIN/CREAT UR: NORMAL MG/G{CREAT}
NONHDLC SERPL-MCNC: 89 MG/DL
POTASSIUM SERPL-SCNC: 4.7 MMOL/L (ref 3.4–5.3)
PROT SERPL-MCNC: 7.5 G/DL (ref 6.4–8.3)
SODIUM SERPL-SCNC: 138 MMOL/L (ref 135–145)
TRIGL SERPL-MCNC: 91 MG/DL
TSH SERPL DL<=0.005 MIU/L-ACNC: 1.56 UIU/ML (ref 0.3–4.2)

## 2023-09-29 PROCEDURE — 80061 LIPID PANEL: CPT | Performed by: FAMILY MEDICINE

## 2023-09-29 PROCEDURE — 80053 COMPREHEN METABOLIC PANEL: CPT | Performed by: FAMILY MEDICINE

## 2023-09-29 PROCEDURE — 84443 ASSAY THYROID STIM HORMONE: CPT | Performed by: FAMILY MEDICINE

## 2023-09-29 PROCEDURE — 90715 TDAP VACCINE 7 YRS/> IM: CPT | Performed by: FAMILY MEDICINE

## 2023-09-29 PROCEDURE — 90632 HEPA VACCINE ADULT IM: CPT | Performed by: FAMILY MEDICINE

## 2023-09-29 PROCEDURE — 90472 IMMUNIZATION ADMIN EACH ADD: CPT | Performed by: FAMILY MEDICINE

## 2023-09-29 PROCEDURE — 82570 ASSAY OF URINE CREATININE: CPT | Performed by: FAMILY MEDICINE

## 2023-09-29 PROCEDURE — 99396 PREV VISIT EST AGE 40-64: CPT | Mod: 25 | Performed by: FAMILY MEDICINE

## 2023-09-29 PROCEDURE — 82043 UR ALBUMIN QUANTITATIVE: CPT | Performed by: FAMILY MEDICINE

## 2023-09-29 PROCEDURE — 36415 COLL VENOUS BLD VENIPUNCTURE: CPT | Performed by: FAMILY MEDICINE

## 2023-09-29 PROCEDURE — 90471 IMMUNIZATION ADMIN: CPT | Performed by: FAMILY MEDICINE

## 2023-09-29 PROCEDURE — 99214 OFFICE O/P EST MOD 30 MIN: CPT | Mod: 25 | Performed by: FAMILY MEDICINE

## 2023-09-29 RX ORDER — ROSUVASTATIN CALCIUM 5 MG/1
5 TABLET, COATED ORAL DAILY
Qty: 90 TABLET | Refills: 3 | Status: SHIPPED | OUTPATIENT
Start: 2023-09-29

## 2023-09-29 RX ORDER — LISINOPRIL 10 MG/1
10 TABLET ORAL DAILY
Qty: 30 TABLET | Refills: 1 | Status: SHIPPED | OUTPATIENT
Start: 2023-09-29 | End: 2023-11-21

## 2023-09-29 RX ORDER — VENLAFAXINE HYDROCHLORIDE 75 MG/1
75 CAPSULE, EXTENDED RELEASE ORAL DAILY
Qty: 90 CAPSULE | Refills: 3 | Status: SHIPPED | OUTPATIENT
Start: 2023-09-29

## 2023-09-29 ASSESSMENT — ENCOUNTER SYMPTOMS
SORE THROAT: 1
BREAST MASS: 0
CONSTIPATION: 1

## 2023-09-29 ASSESSMENT — PAIN SCALES - GENERAL: PAINLEVEL: NO PAIN (0)

## 2023-09-29 NOTE — PROGRESS NOTES
SUBJECTIVE:   CC: Mercedes is an 58 year old who presents for preventive health visit, acute concern and chronic disease management.    Hypertension Follow-up    Do you check your blood pressure regularly outside of the clinic? No   Are you following a low salt diet? Yes  Are your blood pressures ever more than 140 on the top number (systolic) OR more   than 90 on the bottom number (diastolic), for example 140/90? Doesn't know    Constipation    Duration: at last 10 years long, constipation with lower abdominal pain  She uses metamucil and miralax which helps but can have uncontrollable stools with too much miralax  She also describes a feeling of incomplete stooling  She reports eating a healthy diet with plenty of fiber and whole grains, vegetables and fluids  Description:       Frequency of bowel movements: 24 hours       Consistency of stool: regular formed stool  Intensity:  moderate            Rectal pain: no        Blood in stool: no        Nausea/vomitting: no     Hyperlipidemia Follow-Up    Are you regularly taking any medication or supplement to lower your cholesterol?   Yes- Crestor  Are you having muscle aches or other side effects that you think could be caused by your cholesterol lowering medication?  No        9/29/2023    10:35 AM   Additional Questions   Roomed by Ofelia Martinez     Healthy Habits:     Getting at least 3 servings of Calcium per day:  NO    Bi-annual eye exam:  Yes    Dental care twice a year:  Yes    Sleep apnea or symptoms of sleep apnea:  None    Diet:  Regular (no restrictions)    Frequency of exercise:  2-3 days/week    Duration of exercise:  N/A    Taking medications regularly:  Yes    Medication side effects:  None    Additional concerns today:  Yes    Today's PHQ-2 Score:       9/29/2023    10:26 AM   PHQ-2 ( 1999 [employer])   Q1: Little interest or pleasure in doing things 0   Q2: Feeling down, depressed or hopeless 0   PHQ-2 Score 0   Q1: Little interest or pleasure in doing  things Not at all   Q2: Feeling down, depressed or hopeless Not at all   PHQ-2 Score 0     Hyperlipidemia Follow-Up    Are you regularly taking any medication or supplement to lower your cholesterol?   Yes- Crestor  Are you having muscle aches or other side effects that you think could be caused by your cholesterol lowering medication?  No  Have you ever done Advance Care Planning? (For example, a Health Directive, POLST, or a discussion with a medical provider or your loved ones about your wishes): No, advance care planning information given to patient to review.  Patient declined advance care planning discussion at this time.    The 10-year ASCVD risk score (Marcy PHELPS, et al., 2019) is: 2.3%    Values used to calculate the score:      Age: 58 years      Sex: Female      Is Non- : No      Diabetic: No      Tobacco smoker: No      Systolic Blood Pressure: 153 mmHg      Is BP treated: No      HDL Cholesterol: 83 mg/dL      Total Cholesterol: 165 mg/dL     Social History     Tobacco Use    Smoking status: Never    Smokeless tobacco: Never   Substance Use Topics    Alcohol use: Yes     Comment: minimal             9/24/2023     7:12 PM   Alcohol Use   Prescreen: >3 drinks/day or >7 drinks/week? No          No data to display              Reviewed orders with patient.  Reviewed health maintenance and updated orders accordingly - Yes  BP Readings from Last 3 Encounters:   09/29/23 (!) 153/96   10/03/22 134/88   12/17/21 110/62    Wt Readings from Last 3 Encounters:   09/29/23 60.3 kg (133 lb)   10/03/22 59.4 kg (131 lb)   12/17/21 62.6 kg (138 lb)                  Patient Active Problem List   Diagnosis    Nontoxic multinodular goiter    Leiomyoma of uterus    History of chronic sinusitis    Chronic right-sided low back pain with right-sided sciatica    Adult acne    Diverticulosis of large intestine    Mixed hyperlipidemia    Menopausal syndrome (hot flashes)     Past Surgical History:    Procedure Laterality Date    BIOPSY      thyroid    COLONOSCOPY  16    ENDOSCOPY      HC CT MAXILLOFACIAL W/O CONTRAST  2006    chronic sinus disease    HC MRI BRAIN W/O CONTRAST  2004    MRI brain pos sinusitis    HC TOOTH EXTRACTION W/FORCEP      HC US SOFT TISSUE HEAD/NECK  , 3/10    thyroid 3 nodules unchanged, f/b endocrine, s/p FNA #4 isthmus nodule benign 9/10    HYSTEROSCOPY,ABLATION ENDOMETRIUM  2008        SONO PELVIS COMPLETE  , 3/08,    fibroids    TONSILLECTOMY      US ABDOMEN LIMITED PORTABLE  2011    nl       Social History     Tobacco Use    Smoking status: Never    Smokeless tobacco: Never   Substance Use Topics    Alcohol use: Yes     Comment: minimal     Family History   Problem Relation Age of Onset    Lipids Mother     Hypertension Mother     Hyperlipidemia Mother     Hypertension Father     Lipids Father     Blood Disease Father         clotting d/o with factor V leiden-pt negative    Coronary Artery Disease Father         s/p CABG    Hyperlipidemia Father     Genetic Disorder Father         Clotting disorder    Cerebrovascular Disease Maternal Grandfather     Cancer - colorectal Maternal Grandfather     Colon Cancer Maternal Grandfather          Current Outpatient Medications   Medication Sig Dispense Refill    Cholecalciferol (VITAMIN D) 1000 UNITS capsule Take 2 capsules by mouth daily       lisinopril (ZESTRIL) 10 MG tablet Take 1 tablet (10 mg) by mouth daily 30 tablet 1    loteprednol (LOTEMAX) 0.5 % ophthalmic suspension       rosuvastatin (CRESTOR) 5 MG tablet Take 1 tablet (5 mg) by mouth daily 90 tablet 3    venlafaxine (EFFEXOR XR) 75 MG 24 hr capsule Take 1 capsule (75 mg) by mouth daily 90 capsule 3     Allergies   Allergen Reactions    Erythromycin Rash    Penicillins Rash    Seasonal Allergies     Sulfa Antibiotics Rash       Breast Cancer Screenin/16/2021     5:56 PM   Breast CA Risk Assessment (FHS-7)   Do you  have a family history of breast, colon, or ovarian cancer? No / Unknown       Mammogram Screening: Recommended mammography every 1-2 years with patient discussion and risk factor consideration  Pertinent mammograms are reviewed under the imaging tab.    History of abnormal Pap smear: NO - age 30-65 PAP every 5 years with negative HPV co-testing recommended      Latest Ref Rng & Units 12/17/2021    11:27 AM 5/9/2014    12:00 AM 11/30/2011    11:08 AM   PAP / HPV   PAP  Negative for Intraepithelial Lesion or Malignancy (NILM)      PAP (Historical)   NIL  NIL    HPV 16 DNA Negative Negative      HPV 18 DNA Negative Negative      Other HR HPV Negative Negative        Reviewed and updated as needed this visit by clinical staff   Tobacco  Allergies  Meds              Reviewed and updated as needed this visit by Provider                 Past Medical History:   Diagnosis Date    Acne     Allergic rhinitis     Benign essential hypertension     Chronic fatigue     Constipation     Disorders of bursae and tendons in shoulder region, unspecified     Lt shoulder s/p steroid injection 7/02    GERD (gastroesophageal reflux disease) 04/2009    Headache(784.0)     with recurrent sinusitis and allergies,Dr. Traore ENT     Insomnia     Leiomyoma of uterus, unspecified 02/2006    2 small fibroids noted on pelvic us    Low back pain     Menorrhagia 2008    tx'd with ablation    Nausea     weight loss / endoscopy    Nontoxic multinodular goiter     followed by Dr. Raymundo.  Biopsy nl    Other acne     derm Dr. Ventura, started spironolactone 3/08    Perennial allergic rhinitis 2006    gets allergy injections    Unspecified sinusitis (chronic)     takes probiotic- [employer] FOR[employer] chronic, recurrent sinusitis, sinus disease noted on ct, mri      Past Surgical History:   Procedure Laterality Date    BIOPSY      thyroid    COLONOSCOPY  7/1/16    ENDOSCOPY      HC CT MAXILLOFACIAL W/O CONTRAST  01/01/2006    chronic sinus disease    HC MRI BRAIN  "W/O CONTRAST  2004    MRI brain pos sinusitis    HC TOOTH EXTRACTION W/FORCEP      HC US SOFT TISSUE HEAD/NECK  , 3/10    thyroid 3 nodules unchanged, f/b endocrine, s/p FNA #4 isthmus nodule benign 9/10    HYSTEROSCOPY,ABLATION ENDOMETRIUM  2008        SONO PELVIS COMPLETE  , 3/08,    fibroids    TONSILLECTOMY      US ABDOMEN LIMITED PORTABLE  2011    nl     OB History    Para Term  AB Living   3 3 3 0 0 3   SAB IAB Ectopic Multiple Live Births   0 0 0 0 0      # Outcome Date GA Lbr Perry/2nd Weight Sex Delivery Anes PTL Lv   3 Term            2 Term            1 Term                Review of Systems   HENT:  Positive for sore throat.    Gastrointestinal:  Positive for constipation.   Breasts:  Negative for tenderness, breast mass and discharge.   Genitourinary:  Negative for pelvic pain, vaginal bleeding and vaginal discharge.      OBJECTIVE:   BP (!) 153/96 (BP Location: Right arm, Patient Position: Sitting, Cuff Size: Adult Regular)   Pulse 73   Temp 97.4  F (36.3  C) (Temporal)   Resp 15   Ht 1.6 m (5' 3\")   Wt 60.3 kg (133 lb)   SpO2 96%   BMI 23.56 kg/m    Physical Exam  GENERAL: healthy, alert and no distress  EYES: Eyes grossly normal to inspection, PERRL and conjunctivae and sclerae normal  HENT: ear canals and TM's normal, nose and mouth without ulcers or lesions  NECK: no adenopathy, no asymmetry, masses, or scars and thyroid normal to palpation  RESP: lungs clear to auscultation - no rales, rhonchi or wheezes  BREAST: normal without masses, tenderness or nipple discharge and no palpable axillary masses or adenopathy  CV: regular rate and rhythm, normal S1 S2, no S3 or S4, no murmur, click or rub, no peripheral edema and peripheral pulses strong  ABDOMEN: soft, nontender, no hepatosplenomegaly, no masses and bowel sounds normal  MS: no gross musculoskeletal defects noted, no edema  SKIN: no suspicious lesions or rashes  NEURO: Normal strength " and tone, mentation intact and speech normal  PSYCH: mentation appears normal, affect normal/bright    ASSESSMENT/PLAN:   (Z00.00) Routine general medical examination at a health care facility  (primary encounter diagnosis)    (I10) Hypertension  Probably chronic but not previously on medication, will start lisinipril 10 mg today, monitor blood pressure at home, and follow up virtual visit one month.    (E78.2) Mixed hyperlipidemia  Comment:   LDL Cholesterol Calculated   Date Value Ref Range Status   12/17/2021 64 <=100 mg/dL Final   05/19/2021 209 (H) <100 mg/dL Final     Comment:     Above desirable:  100-129 mg/dl  Borderline High:  130-159 mg/dL  High:             160-189 mg/dL  Very high:       >189 mg/dl        Plan: Lipid panel reflex to direct LDL Fasting,         rosuvastatin (CRESTOR) 5 MG tablet        At goal  The current medical regimen is effective;  continue present plan and medications.      (N95.1) Menopausal syndrome (hot flashes)  Comment: At goal  The current medical regimen is effective;  continue present plan and medications.    Plan: venlafaxine (EFFEXOR XR) 75 MG 24 hr capsule,         Comprehensive metabolic panel (BMP + Alb, Alk         Phos, ALT, AST, Total. Bili, TP)          (Z12.31) Visit for screening mammogram  Plan: MA SCREENING DIGITAL BILAT - Future  (s+30)          Patient has been advised of split billing requirements and indicates understanding: Yes    COUNSELING:  Reviewed preventive health counseling, as reflected in patient instructions    She reports that she has never smoked. She has never used smokeless tobacco.          Harika Montoya MD  [facility]

## 2023-09-29 NOTE — NURSING NOTE
Prior to immunization administration, verified patients identity using patient s name and date of birth. Please see Immunization Activity for additional information.     Screening Questionnaire for Adult Immunization    Are you sick today?   No   Do you have allergies to medications, food, a vaccine component or latex?   Yes  Erythromycin  Penicillins  Sulfa  Seasonal allergies    Have you ever had a serious reaction after receiving a vaccination?   No   Do you have a long-term health problem with heart, lung, kidney, or metabolic disease (e.g., diabetes), asthma, a blood disorder, no spleen, complement component deficiency, a cochlear implant, or a spinal fluid leak?  Are you on long-term aspirin therapy?   No   Do you have cancer, leukemia, HIV/AIDS, or any other immune system problem?   No   Do you have a parent, brother, or sister with an immune system problem?   No   In the past 3 months, have you taken medications that affect  your immune system, such as prednisone, other steroids, or anticancer drugs; drugs for the treatment of rheumatoid arthritis, Crohn s disease, or psoriasis; or have you had radiation treatments?   No   Have you had a seizure, or a brain or other nervous system problem?   No   During the past year, have you received a transfusion of blood or blood    products, or been given immune (gamma) globulin or antiviral drug?   No   For women: Are you pregnant or is there a chance you could become       pregnant during the next month?   No   Have you received any vaccinations in the past 4 weeks?   Flu shot     Immunization questionnaire was positive for at least one answer.      Patient instructed to remain in clinic for 15 minutes afterwards, and to report any adverse reactions.     Screening performed by Caity Henderson on 9/29/2023 at 11:47 AM.

## 2023-09-29 NOTE — PATIENT INSTRUCTIONS
Preventive Health Recommendations  Female Ages 50 - 64    Yearly exam: See your health care provider every year in order to  Review health changes.   Discuss preventive care.    Review your medicines if your doctor has prescribed any.    Get a Pap test with HPV screening every 5 years.   You should be tested each year for STDs (sexually transmitted diseases) if you're at risk.   Have a mammogram every 1 to 2 years.  Have a colonoscopy at age 50, or have a yearly FIT test (stool test). These exams screen for colon cancer.    Have a cholesterol test every 5 years, or more often if advised.  Have a diabetes test (fasting glucose) every three years. If you are at risk for diabetes, you should have this test more often.   If you are at risk for osteoporosis (brittle bone disease), think about having a bone density scan (DEXA).    Shots: Get a flu shot each year. Get a tetanus shot every 10 years.    Nutrition:   Eat at least 5 servings of fruits and vegetables each day.  Eat whole-grain bread, whole-wheat pasta and brown rice instead of white grains and rice.  Get adequate Calcium and Vitamin D.     Lifestyle  Exercise at least 150 minutes a week (30 minutes a day, 5 days a week). This will help you control your weight and prevent disease.  Limit alcohol to one drink per day.  No smoking.   Wear sunscreen to prevent skin cancer.   See your dentist every six months for an exam and cleaning.  See your eye doctor every 1 to 2 years.

## 2023-10-05 NOTE — RESULT ENCOUNTER NOTE
Thank you very much for getting labs done! Everything looks normal/stable.    Sincerely,  10/5/2023

## 2023-11-19 NOTE — TELEPHONE ENCOUNTER
REFERRAL INFORMATION:  Referring Provider:  Dr. Harika Montoya  Referring Clinic:  [facility]  Reason for Visit/Diagnosis: Chronic constipation      FUTURE VISIT INFORMATION:  Appointment Date: 12/1/23  Appointment Time: 11:00 am     NOTES STATUS DETAILS   OFFICE NOTE from Referring Provider Internal Dr Harika Montoya @ [facility]:  9/29/23   MEDICATION LIST Internal         COLONOSCOPY Received [facility]:  7/1/16   PERTINENT LABS Internal Internal labs

## 2023-11-21 ENCOUNTER — VIRTUAL VISIT (OUTPATIENT)
Dept: FAMILY MEDICINE | Facility: CLINIC | Age: 58
End: 2023-11-21
Attending: FAMILY MEDICINE
Payer: COMMERCIAL

## 2023-11-21 DIAGNOSIS — I10 BENIGN ESSENTIAL HYPERTENSION WITH TARGET BLOOD PRESSURE BELOW 140/90: Primary | ICD-10-CM

## 2023-11-21 PROCEDURE — 99213 OFFICE O/P EST LOW 20 MIN: CPT | Mod: VID | Performed by: FAMILY MEDICINE

## 2023-11-21 RX ORDER — LISINOPRIL 10 MG/1
10 TABLET ORAL DAILY
Qty: 90 TABLET | Refills: 3 | Status: SHIPPED | OUTPATIENT
Start: 2023-11-21

## 2023-11-21 NOTE — PROGRESS NOTES
Mercedes is a 58 year old who is being evaluated via a billable video visit.      How would you like to obtain your AVS? MyChart  If the video visit is dropped, the invitation should be resent by: Send to e-mail at: [email or web address]  Will anyone else be joining your video visit? No        Assessment & Plan     (I10) Benign essential hypertension with target blood pressure below 140/90  (primary encounter diagnosis)  At goal  The current medical regimen is effective;  continue present plan and medications.      Harika Montoya MD  [facility]   Mercedes is a 58 year old, presenting for the following health issues:  Am 109/79, 120/80's at bedtime  She had sbp in the 90's when she started lisinopril    Follow Up (bp)      11/21/2023     7:40 AM   Additional Questions   Roomed by Luke   Accompanied by self       History of Present Illness       Hypertension: She presents for follow up of hypertension.  She does check blood pressure  regularly outside of the clinic. Outpatient blood pressures have not been over 140/90. She follows a low salt diet.     She eats 4 or more servings of fruits and vegetables daily.She consumes 0 sweetened beverage(s) daily.She exercises with enough effort to increase her heart rate 9 or less minutes per day.  She exercises with enough effort to increase her heart rate 3 or less days per week.   She is taking medications regularly.     Review of Systems   Constitutional, HEENT, cardiovascular, pulmonary, gi and gu systems are negative, except as otherwise noted.      Objective           Vitals:  No vitals were obtained today due to virtual visit.    Physical Exam   GENERAL: Healthy, alert and no distress  EYES: Eyes grossly normal to inspection.  No discharge or erythema, or obvious scleral/conjunctival abnormalities.  RESP: No audible wheeze, cough, or visible cyanosis.  No visible retractions or increased work of breathing.    SKIN: Visible  skin clear. No significant rash, abnormal pigmentation or lesions.  NEURO: Cranial nerves grossly intact.  Mentation and speech appropriate for age.  PSYCH: Mentation appears normal, affect normal/bright, judgement and insight intact, normal speech and appearance well-groomed.          Video-Visit Details    Type of service:  Video Visit     Originating Location (pt. Location): Home    Distant Location (provider location):  Off-site  Platform used for Video Visit: Comp[employer]

## 2023-11-27 ENCOUNTER — HOSPITAL ENCOUNTER (OUTPATIENT)
Dept: MAMMOGRAPHY | Facility: CLINIC | Age: 58
Discharge: HOME | End: 2023-11-27
Admitting: FAMILY MEDICINE
Payer: COMMERCIAL

## 2023-11-27 DIAGNOSIS — Z12.31 VISIT FOR SCREENING MAMMOGRAM: ICD-10-CM

## 2023-11-27 PROCEDURE — 77067 SCR MAMMO BI INCL CAD: CPT

## 2023-11-29 ENCOUNTER — NURSE TRIAGE (OUTPATIENT)
Dept: FAMILY MEDICINE | Facility: CLINIC | Age: 58
End: 2023-11-29
Payer: COMMERCIAL

## 2023-11-29 ENCOUNTER — MYC MEDICAL ADVICE (OUTPATIENT)
Dept: FAMILY MEDICINE | Facility: CLINIC | Age: 58
End: 2023-11-29
Payer: COMMERCIAL

## 2023-11-29 DIAGNOSIS — U07.1 INFECTION DUE TO 2019 NOVEL CORONAVIRUS: Primary | ICD-10-CM

## 2023-11-29 NOTE — TELEPHONE ENCOUNTER
RN COVID TREATMENT VISIT  11/29/23      The patient has been triaged and does not require a higher level of care.    Mercedes Patel  58 year old  Current weight? 133 lbs    Has the patient been seen by a primary care provider at an [facility] or [facility] within the past two years? Yes.   Have you been in close proximity to/do you have a known exposure to a person with a confirmed case of influenza? No.     General treatment eligibility:  Date of positive COVID test (PCR or at home)?  11/29/23    Are you or have you been hospitalized for this COVID-19 infection? No.   Have you received monoclonal antibodies or antiviral treatment for COVID-19 since this positive test? No.   Do you have any of the following conditions that place you at risk of being very sick from COVID-19?   - Age 50 years or older  Yes, patient has at least one high risk condition as noted above.     Current COVID symptoms:   - headache  - congestion or runny nose  Yes. Patient has at least one symptom as selected.     How many days since symptoms started? 5 days or less. Established patient, 12 years or older weighing at least 88.2 lbs, who has symptoms that started in the past 5 days, has not been hospitalized nor received treatment already, and is at risk for being very sick from COVID-19.     Treatment eligibility by RN:  Are you currently pregnant or nursing? No  Do you have a clinically significant hypersensitivity to nirmatrelvir or ritonavir, or toxic epidermal necrolysis (TEN) or Law-Jd Syndrome? No  Do you have a history of hepatitis, any hepatic impairment on the Problem List (such as Child-Marcano Class C, cirrhosis, fatty liver disease, alcoholic liver disease), or was the last liver lab (hepatic panel, ALT, AST, ALK Phos, bilirubin) elevated in the past 6 months? No  Do you have any history of severe renal impairment (eGFR < 30mL/min)? No    Is patient eligible to continue? Yes, patient meets all  eligibility requirements for the RN COVID treatment (as denoted by all no responses above).     Current Outpatient Medications   Medication Sig Dispense Refill    Cholecalciferol (VITAMIN D) 1000 UNITS capsule Take 2 capsules by mouth daily       lisinopril (ZESTRIL) 10 MG tablet Take 1 tablet (10 mg) by mouth daily 90 tablet 3    loteprednol (LOTEMAX) 0.5 % ophthalmic suspension       rosuvastatin (CRESTOR) 5 MG tablet Take 1 tablet (5 mg) by mouth daily 90 tablet 3    venlafaxine (EFFEXOR XR) 75 MG 24 hr capsule Take 1 capsule (75 mg) by mouth daily 90 capsule 3       Medications from List 1 of the standing order (on medications that exclude the use of Paxlovid) that patient is taking: NONE. Is patient taking Stearns's Wort? No  Is patient taking Stearns's Wort or any meds from List 1? No.   Medications from List 2 of the standing order (on meds that provider needs to adjust) that patient is taking: NONE. Is patient on any of the meds from List 2? No.   Medications from List 3 of standing order (on meds that a RN needs to adjust) that patient is taking: rosuvastatin (Crestor): Instructed patient to stop rosuvastatin while taking Paxlovid and restart rosuvastatin 1 day after the completion of Paxlovid.  Is patient on any meds from List 3? Yes. Patient is on meds from list 3. No meds require a provider visit and at least one med required RN to adjust.     Paxlovid has an approximate 90% reduction in hospitalization. Paxlovid can possibly cause altered sense of taste, diarrhea (loose, watery stools), high blood pressure, muscle aches.     Would patient like a Paxlovid prescription?   Yes.   Lab Results   Component Value Date    GFRESTIMATED 90 09/29/2023       Was last eGFR reduced? No, eGFR 60 or greater/ No Result on record. Patient can receive the normal renal function dose. Paxlovid Rx sent to [place] pharmacy   prefferred    Temporary change to home medications: see above    All medication adjustments (holds,  etc) were discussed with the patient and patient was asked to repeat back (teachback) their med adjustment.  Did patient understand med adjustment? Yes, patient repeated back and understood correctly.        Reviewed the following instructions with the patient:    Paxlovid (nimatrelvir and ritonavir)    How it works  Two medicines (nirmatrelvir and ritonavir) are taken together. They stop the virus from growing. Less amount of virus is easier for your body to fight.    How to take  Medicine comes in a daily container with both medicine tablets. Take by mouth twice daily (once in the morning, once at night) for 5 days.  The number of tablets to take varies by patient.  Don't chew or break capsules. Swallow whole.    When to take  Take as soon as possible after positive COVID-19 test result, and within 5 days of your first symptoms.    Possible side effects  Can cause altered sense of taste, diarrhea (loose, watery stools), high blood pressure, muscle aches.    Amanda Guaman RN     Reason for Disposition   [1] HIGH RISK patient (e.g., weak immune system, age > 64 years, obesity with BMI 30 or higher, pregnant, chronic lung disease or other chronic medical condition) AND [2] COVID symptoms (e.g., cough, fever)  (Exceptions: Already seen by PCP and no new or worsening symptoms.)    Additional Information   Negative: SEVERE difficulty breathing (e.g., struggling for each breath, speaks in single words)   Negative: Difficult to awaken or acting confused (e.g., disoriented, slurred speech)   Negative: Bluish (or gray) lips or face now   Negative: Shock suspected (e.g., cold/pale/clammy skin, too weak to stand, low BP, rapid pulse)   Negative: Sounds like a life-threatening emergency to the triager   Negative: SEVERE or constant chest pain or pressure  (Exception: Mild central chest pain, present only when coughing.)   Negative: MODERATE difficulty breathing (e.g., speaks in phrases, SOB even at rest, pulse 100-120)    Negative: [1] Headache AND [2] stiff neck (can't touch chin to chest)   Negative: Oxygen level (e.g., pulse oximetry) 90 percent or lower   Negative: Chest pain or pressure  (Exception: MILD central chest pain, present only when coughing.)   Negative: [1] Drinking very little AND [2] dehydration suspected (e.g., no urine > 12 hours, very dry mouth, very lightheaded)   Negative: Patient sounds very sick or weak to the triager   Negative: Oxygen level (e.g., pulse oximetry) 91 to 94 percent    Protocols used: Coronavirus (COVID-19) Diagnosed or [id number]

## 2023-12-01 ENCOUNTER — PRE VISIT (OUTPATIENT)
Dept: GASTROENTEROLOGY | Facility: CLINIC | Age: 58
End: 2023-12-01

## 2024-05-17 ENCOUNTER — OFFICE VISIT (OUTPATIENT)
Dept: GASTROENTEROLOGY | Facility: CLINIC | Age: 59
End: 2024-05-17
Attending: FAMILY MEDICINE
Payer: COMMERCIAL

## 2024-05-17 VITALS
WEIGHT: 138 LBS | BODY MASS INDEX: 24.45 KG/M2 | HEIGHT: 63 IN | SYSTOLIC BLOOD PRESSURE: 123 MMHG | HEART RATE: 90 BPM | OXYGEN SATURATION: 99 % | DIASTOLIC BLOOD PRESSURE: 85 MMHG

## 2024-05-17 DIAGNOSIS — R14.2 BELCHING: ICD-10-CM

## 2024-05-17 DIAGNOSIS — K59.09 CHRONIC CONSTIPATION: ICD-10-CM

## 2024-05-17 DIAGNOSIS — K21.9 GASTROESOPHAGEAL REFLUX DISEASE WITHOUT ESOPHAGITIS: Primary | ICD-10-CM

## 2024-05-17 PROCEDURE — 99205 OFFICE O/P NEW HI 60 MIN: CPT | Performed by: STUDENT IN AN ORGANIZED HEALTH CARE EDUCATION/TRAINING PROGRAM

## 2024-05-17 RX ORDER — OMEPRAZOLE 40 MG/1
40 CAPSULE, DELAYED RELEASE ORAL DAILY
Qty: 60 CAPSULE | Refills: 0 | Status: SHIPPED | OUTPATIENT
Start: 2024-05-17 | End: 2024-08-21

## 2024-05-17 ASSESSMENT — PAIN SCALES - GENERAL: PAINLEVEL: NO PAIN (0)

## 2024-05-17 NOTE — PATIENT INSTRUCTIONS
"It was a pleasure taking care of you today.  I've included a brief summary of our discussion and care plan from today's visit below.  Please review this information with your primary care provider.  _______________________________________________________________________     My recommendations are summarized as follows:  -- Start on daily miralax again, 1/2 capful to 1 capful daily   -- After two weeks on daily miralax, add in fiber as below:  -- Recommend starting supplementation with a powdered soluble fiber. When used on a daily basis, this can help regulate the consistency of your stools. Generally, the powdered variations often work best. There are many different varieties out there, with the following general recommendations:  1) Metamucil (psyllium) and Citrucel. These are \"gel-forming\" fibers, which make a gel-like substance when mixed water. Make sure to mix well and drink promptly. You can start with 1-2 teaspoons per day, with goal to gradually increase to 1 tablespoon daily. You can increase up to 1 tablespoon three times daily if needed. It is important to stay well-hydrated with use of fiber supplementation and to make sure that the fiber powder is well mixed with water as directed on the label. You may experience some bloating with initiation of fiber, which will improve over the first few weeks. A good trial to evaluate the effect is 3-6 months. Of note, many of the fiber products contain artificial sweeteners, which can cause bloating, gas, and diarrhea in those who may be sensitive to artificial sweeteners. If this is the case, recommend trying Metamucil Premium Blend (with Stevia), Metamucil 4-in-1 without Added Sweeteners, or Bellway (sweetened with Monk fruit extract).  2)  If you cannot tolerate the gel-forming fibers, or would prefer to use a product without added sweeteners, you can try Benefiber. The dosing is the same, with the recommendation to start with 1-2 teaspoons daily and gradually " increase to 1 tablespoon daily, up to three times daily if needed.  3)You can also use plain, pure psyllium husk powder. This has no additives. For this, start with 1/2 teaspoon daily at first, then gradually increase to 1-2 teaspoons daily.     -- Start 40 mg omeprazole daily, 30-60 minutes before breakfast daily for 2 months, then discontinue  -- EGD ordered -- you should get a call to schedule within 1 week. If not, call us at [phone]  -- Follow up with me in 3 months or so    ______________________________________________________________________     How do I schedule labs, imaging studies, or procedures that were ordered in clinic today?      Labs: To schedule lab appointment you can contact your local [facility] or call [phone] to schedule at any convenient [facility] location.      Procedures: If a colonoscopy, upper endoscopy, breath test, esophageal manometry, or pH impedence was ordered today, our endoscopy team will call you to schedule this. If you have not heard from our endoscopy team within a week, please call [phone] to schedule.      Imaging Studies: If you were scheduled for a CT scan, X-ray, MRI, ultrasound, HIDA scan or other imaging study, please call [phone] to have this scheduled.      Referral: If a referral to another specialty was ordered, expect a phone call or follow instructions above. If you have not heard from anyone regarding your referral in a week, please call our clinic to check the status.      Who do I call with any questions after my visit?  Please be in touch if there are any further questions that arise following today's visit.  There are multiple ways to contact your gastroenterology care team.       During business hours, you may reach a Gastroenterology nurse at [phone]     To schedule or reschedule an appointment, please call [phone].      You can always send a secure message through [employer].  [employer] messages are answered by  your nurse or doctor typically within 24 hours.  Please allow extra time on weekends and holidays.       For urgent/emergent questions after business hours, you may reach the on-call GI Fellow by contacting the [facility]  at [phone].     How will I get the results of any tests ordered?    You will receive all of your results.  If you have signed up for [facility], any tests ordered at your visit will be available to you after your physician reviews them.  Typically this takes 1-2 weeks.  If there are urgent results that require a change in your care plan, your physician or nurse will call you to discuss the next steps.       What is [employer]?  [employer]v is a secure way for you to access all of your healthcare records from the [facility].  It is a web based computer program, so you can sign on to it from any location.  It also allows you to send secure messages to your care team.  I recommend signing up for [employer] access if you have not already done so and are comfortable with using a computer.       How to I schedule a follow-up visit?  If you did not schedule a follow-up visit today, please call [phone] to schedule a follow-up office visit.

## 2024-05-17 NOTE — PROGRESS NOTES
GI CLINIC VISIT    CC/REFERRING MD:  Harika Montoya  REASON FOR CONSULTATION:   Mercedes Patel is a 59 year old female who I was asked to see in consultation at the request of Dr. Harika Montoya for   Chief Complaint   Patient presents with    New Patient       ASSESSMENT/PLAN:  1. Chronic constipation  Tends towards bristol type 1 stools and will skip days. Uses miralax 1 cap daily as needed. Tried fiber alone which made symptoms worse. Does not typically feel she is completely evacuating. Discussed ways constipation can contribute to upper GI symptoms.     -- Start on daily miralax again, 1/2 capful to 1 capful daily   -- After two weeks on daily miralax, add in fiber supplement   -- Future considerations could include alternative secretogogue       - Adult GI  Referral - Consult Only    2. Gastroesophageal reflux disease without esophagitis  3. Belching  Known small hiatal hernia with some acid reflux at times. New symptom of air trapping sensation within the esophagus both occurring during the day and night, both fasting and non fasting. This is a daily occurrence. Describes as an annoying sensation in her upper chest that makes a sound that her  can hear too. Notes it is most prominent at night when she lays down on her left side. When she rolls to her right, it goes away, and she is eventually able to roll to her left without the sound ro sensation. During these episodes she cannot belch, but other times she is able to belch. She does drink some carbonation at times.     -- Discussed aerophagia and recommend trying 2 weeks off all carbonation, avoid straws, chewing gum.  -- Will obtain EGD to assess hiatal hernia, signs of reflux, structural abnormalities. Ordered today   -- In the meantime, start on omeprazole 40 mg daily x 2 months, then discontinue   -- Next steps will likely include pH impedence manometry testing      - omeprazole (PRILOSEC) 40 MG DR capsule; Take 1 capsule  "(40 mg) by mouth daily  Dispense: 60 capsule; Refill: 0  - Adult GI  Referral - Procedure Only; Future     Specialty Problems          Gastroenterology Diagnoses    Diverticulosis of large intestine           RTC 3 months      Thank you for this consultation. It was a pleasure to participate in the care of this patient; please contact us with any further questions.  A total of 43 face-to-face minutes was spent with this patient, >50% of which was counseling regarding the above delineated issues. An additional 21 minutes was spent on the date of the encounter doing chart review, documentation, and further activities as noted above.    Carlota Rodriguez PA-C  Division of Gastroenterology, Hepatology and Nutrition  [facility]    ---------------------------------------------------------------------------------------------------  HPI:  Mercedes Patel is a 59 year old female with past medical history significant for hypertension, uterine leiomyoma, nontoxic multinodular goiter, history of chronic sinusitis who presents for chronic constipation and sensation of air trapping in the esophagus.     She has had constipation for 10+ years with lower abdominal pain. Uses metamucil and miralax. Feels incomplete. Has daily bowel movements. There is a CT abd/pelvis from 2012 showing large amount of stool throughout the colon.     She reports that 10 years ago had a 20 lb unexplained wt loss with normal EGD.     More recently, she has a sensation like she \"needs to belch but it won't come out\" intermittently. States it makes a constant sound when lays on left side at night, but when she rolls to her right it goes away. Eventually she is able to roll back to her left without producing the sound. It is not painful, but is annoying. She feels the sensation in her upper central chest. She states it will happen when she is at work upright at times too. This has been a daily occurrence for the last 6 months. She tries " to stop eating at 7 pm before bed. Her  is able to hear it when it occurs. It does not seem correlated with meals. She does intermittent fasting and it will happen both nonfasting and fasting. She is otherwise able to belch, but during the times that it comes on, she cannot.     She does have occasional acid reflux, no heartburn and rare regurgitation. Several months ago she does remember waking in the night with acid and choked on the acid. This only happened once. She denies any dysphagia to solids or liquids. Denies odynophagia, globus sensation, nausea, vomiting, or any chest or abdominal pain.     She does have a chronic cough after eating. Feels an irritation in her throat that brings on the cough. It is a dry cough. Does not occur much at night. History of chronic sinusitis, not so bad now that she has been treated for environmental allergies. No history of asthma or eczema.     She reports constipation has been worse in the last year. States she has always had constipation. She tried fiber but felt it made symptoms worse. Tried both the powders and the tablets of citrucel, metamucil, flax seeds. Gets relief from miralax daily prn - typically takes for a week when constipated 1 cap per day. Did take daily for couple months, was having small bowel movements type 5, soft, incomplete sensation and some fecal incontinence a few times in the last year. When she takes miralax, bowel movements are daily. Off miralax, she will skip a few days without a bowel movement. Typically bristol type 1 stools.     Alleviating/treatments tried -- tried avoiding different foods, avoids spicy, avoids lots of carbs     Family history -- mom with chronic constipation and had 6 feet of bowel removed (elective), maternal grandfather in 60s-70s had colon cancer, no celiac or crohns or celiac  NSAID use-- ibuprofen for aches and pains, not daily but somewhat regularly  No recent antibiotics in last 3 years, but in the past for  "recurrent sinusitis would be on abx every few months   Hx abd surgeries-- no   Hx colonoscopy -- 2016 at age 51, for screening. Prep was good. There was one polyp (hyperplastic) and diverticulosis present. Recommended to repeat in 2026.   EGD 2012      ROS:    A 10 point review of systems was performed and is negative except as noted in the HPI.     PREVIOUS ENDOSCOPY:  2016 colonoscopy for screening   - hyperplastic polyp, diverticulosis otherwise normal     2012 EGD  - normal, negative H pylori     ALLERGIES:     Allergies   Allergen Reactions    Erythromycin Rash    Penicillins Rash    Seasonal Allergies     Sulfa Antibiotics Rash       PERTINENT MEDICATIONS:  Current Outpatient Medications   Medication Sig Dispense Refill    Cholecalciferol (VITAMIN D) 1000 UNITS capsule Take 2 capsules by mouth daily       lisinopril (ZESTRIL) 10 MG tablet Take 1 tablet (10 mg) by mouth daily 90 tablet 3    loteprednol (LOTEMAX) 0.5 % ophthalmic suspension       rosuvastatin (CRESTOR) 5 MG tablet Take 1 tablet (5 mg) by mouth daily 90 tablet 3    venlafaxine (EFFEXOR XR) 75 MG 24 hr capsule Take 1 capsule (75 mg) by mouth daily 90 capsule 3     No current facility-administered medications for this visit.       FAMILY HISTORY:  Family History   Problem Relation Age of Onset    Lipids Mother     Hypertension Mother     Hyperlipidemia Mother     Hypertension Father     Lipids Father     Blood Disease Father         clotting d/o with factor V leiden-pt negative    Coronary Artery Disease Father 85        2 vessel, d age 90    Hyperlipidemia Father     Genetic Disorder Father         Clotting disorder    Cerebrovascular Disease Maternal Grandfather     Cancer - colorectal Maternal Grandfather     Colon Cancer Maternal Grandfather        Past/family/social history reviewed and no changes    PHYSICAL EXAMINATION:  Vitals /85   Pulse 90   Ht 1.6 m (5' 3\")   Wt 62.6 kg (138 lb)   SpO2 99%   BMI 24.45 kg/m     Wt   Wt Readings " from Last 2 Encounters:   05/17/24 62.6 kg (138 lb)   09/29/23 60.3 kg (133 lb)      Gen: Pt sitting up on exam table in NAD, interactive and cooperative on exam  Eyes: sclerae anicteric, no injection  ENT:  OP clear, MMM, no thyromegaly or thyroid nodules palpated   Cardiac: RRR, nl S1, S2, no murmurs, rubs or gallops  Resp: Clear on anterior exam  GI: Normoactive BS, abd soft, flat, nontender throughout all quadrants, no organomegaly or masses palpated  Skin: Warm, dry, no jaundice, nails appear healthy  Lymph: no submandibular, no cervical, and no supraclavicular LAD  Neuro: alert, oriented, answers questions appropriately

## 2024-05-17 NOTE — NURSING NOTE
"chief complaint    Vitals:    05/17/24 1049   BP: 123/85   Pulse: 90   SpO2: 99%   Weight: 62.6 kg (138 lb)   Height: 1.6 m (5' 3\")       Body mass index is 24.45 kg/m .    Tia Jones MA    "

## 2024-05-17 NOTE — LETTER
5/17/2024         RE: Mercedes Patel  [address]  [facility] [address]        Dear Colleague,    Thank you for referring your patient, Mercedes Patel, to the [facility] [place]. Please see a copy of my visit note below.    GI CLINIC VISIT    CC/REFERRING MD:  Harika Montoya  REASON FOR CONSULTATION:   Mercedes Patel is a 59 year old female who I was asked to see in consultation at the request of Dr. Harika Montoya for   Chief Complaint   Patient presents with    New Patient       ASSESSMENT/PLAN:  1. Chronic constipation  Tends towards bristol type 1 stools and will skip days. Uses miralax 1 cap daily as needed. Tried fiber alone which made symptoms worse. Does not typically feel she is completely evacuating. Discussed ways constipation can contribute to upper GI symptoms.     -- Start on daily miralax again, 1/2 capful to 1 capful daily   -- After two weeks on daily miralax, add in fiber supplement   -- Future considerations could include alternative secretogogue       - Adult GI  Referral - Consult Only    2. Gastroesophageal reflux disease without esophagitis  3. Belching  Known small hiatal hernia with some acid reflux at times. New symptom of air trapping sensation within the esophagus both occurring during the day and night, both fasting and non fasting. This is a daily occurrence. Describes as an annoying sensation in her upper chest that makes a sound that her  can hear too. Notes it is most prominent at night when she lays down on her left side. When she rolls to her right, it goes away, and she is eventually able to roll to her left without the sound ro sensation. During these episodes she cannot belch, but other times she is able to belch. She does drink some carbonation at times.     -- Discussed aerophagia and recommend trying 2 weeks off all carbonation, avoid straws, chewing gum.  -- Will obtain EGD to assess  "hiatal hernia, signs of reflux, structural abnormalities. Ordered today   -- In the meantime, start on omeprazole 40 mg daily x 2 months, then discontinue   -- Next steps will likely include pH impedence manometry testing      - omeprazole (PRILOSEC) 40 MG DR capsule; Take 1 capsule (40 mg) by mouth daily  Dispense: 60 capsule; Refill: 0  - Adult GI  Referral - Procedure Only; Future     Specialty Problems          Gastroenterology Diagnoses    Diverticulosis of large intestine           RTC 3 months      Thank you for this consultation. It was a pleasure to participate in the care of this patient; please contact us with any further questions.  A total of 43 face-to-face minutes was spent with this patient, >50% of which was counseling regarding the above delineated issues. An additional 21 minutes was spent on the date of the encounter doing chart review, documentation, and further activities as noted above.    Carlota Rodriguez PA-C  Division of Gastroenterology, Hepatology and Nutrition  [facility]    ---------------------------------------------------------------------------------------------------  HPI:  Mercedes Patel is a 59 year old female with past medical history significant for hypertension, uterine leiomyoma, nontoxic multinodular goiter, history of chronic sinusitis who presents for chronic constipation and sensation of air trapping in the esophagus.     She has had constipation for 10+ years with lower abdominal pain. Uses metamucil and miralax. Feels incomplete. Has daily bowel movements. There is a CT abd/pelvis from 2012 showing large amount of stool throughout the colon.     She reports that 10 years ago had a 20 lb unexplained wt loss with normal EGD.     More recently, she has a sensation like she \"needs to belch but it won't come out\" intermittently. States it makes a constant sound when lays on left side at night, but when she rolls to her right it goes away. Eventually she " is able to roll back to her left without producing the sound. It is not painful, but is annoying. She feels the sensation in her upper central chest. She states it will happen when she is at work upright at times too. This has been a daily occurrence for the last 6 months. She tries to stop eating at 7 pm before bed. Her  is able to hear it when it occurs. It does not seem correlated with meals. She does intermittent fasting and it will happen both nonfasting and fasting. She is otherwise able to belch, but during the times that it comes on, she cannot.     She does have occasional acid reflux, no heartburn and rare regurgitation. Several months ago she does remember waking in the night with acid and choked on the acid. This only happened once. She denies any dysphagia to solids or liquids. Denies odynophagia, globus sensation, nausea, vomiting, or any chest or abdominal pain.     She does have a chronic cough after eating. Feels an irritation in her throat that brings on the cough. It is a dry cough. Does not occur much at night. History of chronic sinusitis, not so bad now that she has been treated for environmental allergies. No history of asthma or eczema.     She reports constipation has been worse in the last year. States she has always had constipation. She tried fiber but felt it made symptoms worse. Tried both the powders and the tablets of citrucel, metamucil, flax seeds. Gets relief from miralax daily prn - typically takes for a week when constipated 1 cap per day. Did take daily for couple months, was having small bowel movements type 5, soft, incomplete sensation and some fecal incontinence a few times in the last year. When she takes miralax, bowel movements are daily. Off miralax, she will skip a few days without a bowel movement. Typically bristol type 1 stools.     Alleviating/treatments tried -- tried avoiding different foods, avoids spicy, avoids lots of carbs     Family history -- mom with  chronic constipation and had 6 feet of bowel removed (elective), maternal grandfather in 60s-70s had colon cancer, no celiac or crohns or celiac  NSAID use-- ibuprofen for aches and pains, not daily but somewhat regularly  No recent antibiotics in last 3 years, but in the past for recurrent sinusitis would be on abx every few months   Hx abd surgeries-- no   Hx colonoscopy -- 2016 at age 51, for screening. Prep was good. There was one polyp (hyperplastic) and diverticulosis present. Recommended to repeat in 2026.   EGD 2012      ROS:    A 10 point review of systems was performed and is negative except as noted in the HPI.     PREVIOUS ENDOSCOPY:  2016 colonoscopy for screening   - hyperplastic polyp, diverticulosis otherwise normal     2012 EGD  - normal, negative H pylori     ALLERGIES:     Allergies   Allergen Reactions    Erythromycin Rash    Penicillins Rash    Seasonal Allergies     Sulfa Antibiotics Rash       PERTINENT MEDICATIONS:  Current Outpatient Medications   Medication Sig Dispense Refill    Cholecalciferol (VITAMIN D) 1000 UNITS capsule Take 2 capsules by mouth daily       lisinopril (ZESTRIL) 10 MG tablet Take 1 tablet (10 mg) by mouth daily 90 tablet 3    loteprednol (LOTEMAX) 0.5 % ophthalmic suspension       rosuvastatin (CRESTOR) 5 MG tablet Take 1 tablet (5 mg) by mouth daily 90 tablet 3    venlafaxine (EFFEXOR XR) 75 MG 24 hr capsule Take 1 capsule (75 mg) by mouth daily 90 capsule 3     No current facility-administered medications for this visit.       FAMILY HISTORY:  Family History   Problem Relation Age of Onset    Lipids Mother     Hypertension Mother     Hyperlipidemia Mother     Hypertension Father     Lipids Father     Blood Disease Father         clotting d/o with factor V leiden-pt negative    Coronary Artery Disease Father 85        2 vessel, d age 90    Hyperlipidemia Father     Genetic Disorder Father         Clotting disorder    Cerebrovascular Disease Maternal Grandfather      "Cancer - colorectal Maternal Grandfather     Colon Cancer Maternal Grandfather        Past/family/social history reviewed and no changes    PHYSICAL EXAMINATION:  Vitals /85   Pulse 90   Ht 1.6 m (5' 3\")   Wt 62.6 kg (138 lb)   SpO2 99%   BMI 24.45 kg/m     Wt   Wt Readings from Last 2 Encounters:   05/17/24 62.6 kg (138 lb)   09/29/23 60.3 kg (133 lb)      Gen: Pt sitting up on exam table in NAD, interactive and cooperative on exam  Eyes: sclerae anicteric, no injection  ENT:  OP clear, MMM, no thyromegaly or thyroid nodules palpated   Cardiac: RRR, nl S1, S2, no murmurs, rubs or gallops  Resp: Clear on anterior exam  GI: Normoactive BS, abd soft, flat, nontender throughout all quadrants, no organomegaly or masses palpated  Skin: Warm, dry, no jaundice, nails appear healthy  Lymph: no submandibular, no cervical, and no supraclavicular LAD  Neuro: alert, oriented, answers questions appropriately      Again, thank you for allowing me to participate in the care of your patient.      Sincerely,    Carlota Rodriguez PA-C  "

## 2024-05-21 ENCOUNTER — TELEPHONE (OUTPATIENT)
Dept: GASTROENTEROLOGY | Facility: CLINIC | Age: 59
End: 2024-05-21
Payer: COMMERCIAL

## 2024-05-21 NOTE — TELEPHONE ENCOUNTER
Left Voicemail (1st Attempt) for the patient to call back and schedule the following:    Appointment type: RTN GI  Provider: Carlota Rodriguez  Return date: 8/2024  Specialty phone number: [phone]   Additional appointment(s) needed:   Additional Notes: 3 month follow up. Attach follow up order if able

## 2024-05-31 ENCOUNTER — TELEPHONE (OUTPATIENT)
Dept: GASTROENTEROLOGY | Facility: CLINIC | Age: 59
End: 2024-05-31
Payer: COMMERCIAL

## 2024-05-31 NOTE — TELEPHONE ENCOUNTER
Pre assessment completed for upcoming procedure.   (Please see previous telephone encounter notes for complete details)    Patient  returned call.       Procedure details:    Arrival time and facility location reviewed.    Pre op exam needed? N/A    Designated  policy reviewed. Instructed to have someone stay 6  hours post procedure.       Medication review:    Medications reviewed. Please see supporting documentation below. Holding recommendations discussed (if applicable).       Prep for procedure:     Procedure prep instructions reviewed.        Any additional information needed:  N/A      Patient  verbalized understanding and had no questions or concerns at this time.      Irena Lopez RN  Endoscopy Procedure Pre Assessment   [phone] option 4

## 2024-05-31 NOTE — TELEPHONE ENCOUNTER
"Endoscopy Scheduling Screen    Have you had a positive Covid test in the last 14 days?  No      What is your communication preference for Instructions and/or Bowel Prep?   MyChart      What insurance is in the chart?  Other:  [facility] umr    Ordering/Referring Provider: ALEX DAWSON   (If ordering provider performs procedure, schedule with ordering provider unless otherwise instructed. )    BMI: Estimated body mass index is 24.45 kg/m  as calculated from the following:    Height as of 5/17/24: 1.6 m (5' 3\").    Weight as of 5/17/24: 62.6 kg (138 lb).     Sedation Ordered  moderate sedation.   If patient BMI > 50 do not schedule in [facility].    If patient BMI > 45 do not schedule at [facility].    Are you taking methadone or Suboxone?  No    Have you had difficulties, pain, or discomfort during past endoscopy procedures?  No    Are you taking any prescription medications for pain 3 or more times per week?   NO, No RN review required.      Do you have a history of malignant hyperthermia?  No      (Females) Are you currently pregnant?          Have you been diagnosed or told you have pulmonary hypertension?   No      Do you have an LVAD?  No      Have you been told you have moderate to severe sleep apnea?  No    Have you been told you have COPD, asthma, or any other lung disease?  No      Do you have any heart conditions?  No       Have you ever had or are you waiting for an organ transplant?  No. Continue scheduling, no site restrictions.      Have you had a stroke or transient ischemic attack (TIA aka \"mini stroke\" in the last 6 months?   No      Have you been diagnosed with or been told you have cirrhosis of the liver?   No      Are you currently on dialysis?   No      Do you need assistance transferring?   No    BMI: Estimated body mass index is 24.45 kg/m  as calculated from the following:    Height as of 5/17/24: 1.6 m (5' 3\").    Weight as of 5/17/24: 62.6 kg (138 lb).     Is patients BMI > 40 and scheduling location " [facility]?  No      Do you take an injectable medication for weight loss or diabetes (excluding insulin)?  No    Do you take the medication Naltrexone?  No    Do you take blood thinners?  No       Prep   Are you currently on dialysis or do you have chronic kidney disease?  No    Do you have a diagnosis of diabetes?  No    Do you have a diagnosis of cystic fibrosis (CF)?  No    On a regular basis do you go 3 -5 days between bowel movements?  N/A    BMI > 40?      Preferred Pharmacy:      Final Scheduling Details     Procedure scheduled  Upper endoscopy (EGD)    Surgeon:  ALVAREZ     Date of procedure:  6/4/24     Pre-OP / PAC:   No - Not required for this site.    Location  [facility] - [facility] - Patient preference.    Sedation   Moderate Sedation - Per order.      Patient Reminders:   You will receive a call from a Nurse to review instructions and health history.  This assessment must be completed prior to your procedure.  Failure to complete the Nurse assessment may result in the procedure being cancelled.      On the day of your procedure, please designate an adult(s) who can drive you home stay with you for the next 24 hours. The medicines used in the exam will make you sleepy. You will not be able to drive.      You cannot take public transportation, ride share services, or non-medical taxi service without a responsible caregiver.  Medical transport services are allowed with the requirement that a responsible caregiver will receive you at your destination.  We require that drivers and caregivers are confirmed prior to your procedure.

## 2024-05-31 NOTE — TELEPHONE ENCOUNTER
ADD ON    Attempted to contact patient in order to complete pre assessment questions.     No answer. Left message to return call to [phone] option 4    Callback required communication sent via emids.      Procedure details:    Patient scheduled for Upper endoscopy (EGD) on 6/4/24.     Arrival time: 0930. Procedure time 1030    Facility location: [facility]; [address] [place] [address], [address] Floor, [place] [address]. Check in location: 5th Floor.    Sedation type: Conscious sedation     Pre op exam needed? N/A    Indication for procedure:   Gastroesophageal reflux disease without esophagitis [K21.9]  - Primary      Belching            Chart review:     Electronic implanted devices? No    Recent diagnosis of diverticulitis within the last 6 weeks? No    Diabetic? No      Medication review:    Anticoagulants? No    NSAIDS? No    Other medication HOLDING recommendations:  N/A      Prep for procedure:     Prep instructions sent via emids.       Pallavi Hernandez RN  Endoscopy Procedure Pre Assessment

## 2024-06-04 ENCOUNTER — HOSPITAL ENCOUNTER (OUTPATIENT)
Facility: AMBULATORY SURGERY CENTER | Age: 59
Discharge: HOME | End: 2024-06-04
Attending: INTERNAL MEDICINE | Admitting: INTERNAL MEDICINE
Payer: COMMERCIAL

## 2024-06-04 VITALS
SYSTOLIC BLOOD PRESSURE: 111 MMHG | OXYGEN SATURATION: 94 % | HEIGHT: 63 IN | TEMPERATURE: 98 F | BODY MASS INDEX: 23.74 KG/M2 | WEIGHT: 134 LBS | HEART RATE: 84 BPM | DIASTOLIC BLOOD PRESSURE: 68 MMHG | RESPIRATION RATE: 16 BRPM

## 2024-06-04 LAB — UPPER GI ENDOSCOPY: NORMAL

## 2024-06-04 PROCEDURE — 43239 EGD BIOPSY SINGLE/MULTIPLE: CPT | Performed by: INTERNAL MEDICINE

## 2024-06-04 PROCEDURE — 88305 TISSUE EXAM BY PATHOLOGIST: CPT | Mod: 26 | Performed by: PATHOLOGY

## 2024-06-04 PROCEDURE — 88305 TISSUE EXAM BY PATHOLOGIST: CPT | Mod: TC | Performed by: INTERNAL MEDICINE

## 2024-06-04 RX ORDER — NALOXONE HYDROCHLORIDE 0.4 MG/ML
0.4 INJECTION, SOLUTION INTRAMUSCULAR; INTRAVENOUS; SUBCUTANEOUS
Status: DISCONTINUED | OUTPATIENT
Start: 2024-06-04 | End: 2024-06-05 | Stop reason: HOSPADM

## 2024-06-04 RX ORDER — ONDANSETRON 2 MG/ML
4 INJECTION INTRAMUSCULAR; INTRAVENOUS
Status: DISCONTINUED | OUTPATIENT
Start: 2024-06-04 | End: 2024-06-05 | Stop reason: HOSPADM

## 2024-06-04 RX ORDER — NALOXONE HYDROCHLORIDE 0.4 MG/ML
0.2 INJECTION, SOLUTION INTRAMUSCULAR; INTRAVENOUS; SUBCUTANEOUS
Status: DISCONTINUED | OUTPATIENT
Start: 2024-06-04 | End: 2024-06-05 | Stop reason: HOSPADM

## 2024-06-04 RX ORDER — ONDANSETRON 4 MG/1
4 TABLET, ORALLY DISINTEGRATING ORAL EVERY 6 HOURS PRN
Status: DISCONTINUED | OUTPATIENT
Start: 2024-06-04 | End: 2024-06-05 | Stop reason: HOSPADM

## 2024-06-04 RX ORDER — LIDOCAINE 40 MG/G
CREAM TOPICAL
Status: DISCONTINUED | OUTPATIENT
Start: 2024-06-04 | End: 2024-06-05 | Stop reason: HOSPADM

## 2024-06-04 RX ORDER — PROCHLORPERAZINE MALEATE 10 MG
10 TABLET ORAL EVERY 6 HOURS PRN
Status: DISCONTINUED | OUTPATIENT
Start: 2024-06-04 | End: 2024-06-05 | Stop reason: HOSPADM

## 2024-06-04 RX ORDER — ONDANSETRON 2 MG/ML
4 INJECTION INTRAMUSCULAR; INTRAVENOUS EVERY 6 HOURS PRN
Status: DISCONTINUED | OUTPATIENT
Start: 2024-06-04 | End: 2024-06-05 | Stop reason: HOSPADM

## 2024-06-04 RX ORDER — AZITHROMYCIN 500 MG/1
250 TABLET, FILM COATED ORAL
COMMUNITY

## 2024-06-04 RX ORDER — SODIUM CHLORIDE, SODIUM LACTATE, POTASSIUM CHLORIDE, CALCIUM CHLORIDE 600; 310; 30; 20 MG/100ML; MG/100ML; MG/100ML; MG/100ML
INJECTION, SOLUTION INTRAVENOUS CONTINUOUS
Status: DISCONTINUED | OUTPATIENT
Start: 2024-06-04 | End: 2024-06-05 | Stop reason: HOSPADM

## 2024-06-04 RX ORDER — FLUMAZENIL 0.1 MG/ML
0.2 INJECTION, SOLUTION INTRAVENOUS
Status: ACTIVE | OUTPATIENT
Start: 2024-06-04 | End: 2024-06-04

## 2024-06-04 RX ORDER — FENTANYL CITRATE 50 UG/ML
INJECTION, SOLUTION INTRAMUSCULAR; INTRAVENOUS PRN
Status: DISCONTINUED | OUTPATIENT
Start: 2024-06-04 | End: 2024-06-04 | Stop reason: HOSPADM

## 2024-06-04 NOTE — H&P
Mercedes TERRAZAS [facility]  [id number]  female  59 year old      Reason for procedure/surgery: chest pressure, evaluate for HH    Patient Active Problem List   Diagnosis    Nontoxic multinodular goiter    Leiomyoma of uterus    History of chronic sinusitis    Chronic right-sided low back pain with right-sided sciatica    Adult acne    Diverticulosis of large intestine    Mixed hyperlipidemia    Menopausal syndrome (hot flashes)    Benign essential hypertension with target blood pressure below 140/90       Past Surgical History:    Past Surgical History:   Procedure Laterality Date    BIOPSY      thyroid    COLONOSCOPY  7/1/16    ENDOSCOPY      HC CT MAXILLOFACIAL W/O CONTRAST  01/01/2006    chronic sinus disease    [facility] MRI BRAIN W/O CONTRAST  01/01/2004    MRI brain pos sinusitis    [facility] TOOTH EXTRACTION W/FORCEP  1984    [facility] US SOFT TISSUE HEAD/NECK  2/08, 3/10    thyroid 3 nodules unchanged, f/b endocrine, s/p FNA #4 isthmus nodule benign 9/10    HYSTEROSCOPY,ABLATION ENDOMETRIUM  01/01/2008        SONO PELVIS COMPLETE  2/06, 3/08,12/11    fibroids    TONSILLECTOMY      US ABDOMEN LIMITED PORTABLE  01/01/2011    nl       Past Medical History:   Past Medical History:   Diagnosis Date    Acne     Allergic rhinitis     Benign essential hypertension     Chronic fatigue     Constipation     Disorders of bursae and tendons in shoulder region, unspecified     Lt shoulder s/p steroid injection 7/02    GERD (gastroesophageal reflux disease) 04/2009    Headache(784.0)     with recurrent sinusitis and allergies,Dr. Traore ENT     Insomnia     Leiomyoma of uterus, unspecified 02/2006    2 small fibroids noted on pelvic us    Low back pain     Menorrhagia 2008    tx'd with ablation    Nausea     weight loss / endoscopy    Nontoxic multinodular goiter     followed by Dr. Raymundo.  Biopsy nl    Other acne     derm Dr. Ventura, started spironolactone 3/08    Perennial allergic rhinitis 2006    gets allergy injections    Unspecified  "sinusitis (chronic)     takes probiotic- [employer] FOR[employer] chronic, recurrent sinusitis, sinus disease noted on ct, mri       Social History:   Social History     Tobacco Use    Smoking status: Never    Smokeless tobacco: Never   Substance Use Topics    Alcohol use: Yes     Comment: minimal       Family History:   Family History   Problem Relation Age of Onset    Lipids Mother     Hypertension Mother     Hyperlipidemia Mother     Hypertension Father     Lipids Father     Blood Disease Father         clotting d/o with factor V leiden-pt negative    Coronary Artery Disease Father 85        2 vessel, d age 90    Hyperlipidemia Father     Genetic Disorder Father         Clotting disorder    Cerebrovascular Disease Maternal Grandfather     Cancer - colorectal Maternal Grandfather     Colon Cancer Maternal Grandfather        Allergies:   Allergies   Allergen Reactions    Erythromycin Rash    Penicillins Rash    Seasonal Allergies     Sulfa Antibiotics Rash       Active Medications:   Current Outpatient Medications   Medication Sig Dispense Refill    azithromycin (ZITHROMAX) 500 MG tablet Take 500 mg by mouth three times a week For patient's eye allergies.      Cholecalciferol (VITAMIN D) 1000 UNITS capsule Take 2 capsules by mouth daily       lisinopril (ZESTRIL) 10 MG tablet Take 1 tablet (10 mg) by mouth daily 90 tablet 3    loteprednol (LOTEMAX) 0.5 % ophthalmic suspension       omeprazole (PRILOSEC) 40 MG DR capsule Take 1 capsule (40 mg) by mouth daily 60 capsule 0    rosuvastatin (CRESTOR) 5 MG tablet Take 1 tablet (5 mg) by mouth daily 90 tablet 3    venlafaxine (EFFEXOR XR) 75 MG 24 hr capsule Take 1 capsule (75 mg) by mouth daily 90 capsule 3       Systemic Review:   ROS otherwise negative    Physical Examination:   Vital Signs: /80   Temp 98.7  F (37.1  C) (Temporal)   Resp 16   Ht 1.6 m (5' 3\")   Wt 60.8 kg (134 lb)   SpO2 100%   BMI 23.74 kg/m    GENERAL: healthy, alert and no distress  HENT: oropharynx " clear and oral mucous membranes moist, Mallampati II  NECK: Normal ROM  RESP: lungs clear to auscultation - no rales, rhonchi or wheezes  CV: regular rate and rhythm, normal S1 S2,   ABDOMEN: soft, nontender, and bowel sounds normal  MS: no gross musculoskeletal defects noted, no edema      Plan: Appropriate to proceed as scheduled.      Melisa Reynoso MD  6/4/2024    PCP:  Harika Montoya

## 2024-06-04 NOTE — DISCHARGE INSTRUCTIONS
Discharge Instructions after  Upper Endoscopy (EGD)    Activity and Diet  You were given medicine for pain. You may be dizzy or sleepy.  For 24 hours:   Do not drive or use heavy equipment.   Do not make important decisions.   Do not drink any alcohol.  ___ You may return to your regular diet.    Discomfort  You may have a sore throat for 2 to 3 days. It may help to:   Avoid hot liquids for 24 hours.   Use sore throat lozenges.   Gargle as needed with salt water up to 4 times a day. Mix 1 cup of warm water  with 1 teaspoon of salt. Do not swallow.  ___ Your esophagus was dilated (opened) or banded during the exam:   Drink only cool liquids for the rest of the day. Eat a soft diet for the next few days.   You may have a sore chest for 2 to 3 days.    You may take Tylenol (acetaminophen) for pain unless your doctor has told you not to.    Do not take aspirin or ibuprofen (Advil, Motrin) or other NSAIDS  (anti-inflammatory drugs) for ___ days.    Follow-up  ___ We took small tissue samples for study. If you do not have a follow-up visit scheduled,  call your provider s office in 2 weeks for the results.    Other instructions________________________________________________________    When to call us:  Problems are rare. Call right away if you have:   Unusual throat pain or trouble swallowing   Unusual pain in belly or chest that is not relieved by belching or passing air   Black stools (tar-like looking bowel movement)   Temperature above 100.6  F. (37.5  C).    If you vomit blood or have severe pain, go to an emergency room.    If you have questions, call:  Monday to Friday, 8 a.m. to 4:30 p.m.: Central Scheduling Department:[phone]    After hours: Hospital: [phone] (Ask for the GI fellow on call)

## 2024-06-05 LAB
PATH REPORT.COMMENTS IMP SPEC: NORMAL
PATH REPORT.COMMENTS IMP SPEC: NORMAL
PATH REPORT.FINAL DX SPEC: NORMAL
PATH REPORT.GROSS SPEC: NORMAL
PATH REPORT.MICROSCOPIC SPEC OTHER STN: NORMAL
PATH REPORT.RELEVANT HX SPEC: NORMAL
PHOTO IMAGE: NORMAL

## 2024-08-20 NOTE — PROGRESS NOTES
GI CLINIC VISIT    CC: follow up constipation and belching symptom      ASSESSMENT/PLAN:  1. Chronic constipation  Tends towards bristol type 1 stools and will skip days. Uses miralax 1 cap daily as needed.  When she tried to increase this she had intermittent fecal incontinence when she would go on walks.  She started on Benefiber 2 teaspoons daily which has seemed to help a little bit.  Tried other fibers in the past alone which made bloating worse. Does not typically feel she is completely evacuating.  Suspect she may have a large stool burden contributing to overflow diarrhea when she uses MiraLAX on a more regular basis.  She has had improvement with Benefiber so we will have her go up on this to 3 teaspoons daily and gently increase from there as needed.  Recommend starting on magnesium oxide 400 to 500 mg nightly initially and we will obtain an abdominal x-ray to evaluate stool burden.  If a bowel cleanse is necessary we may want to consider half cleanse or utilizing magnesium citrate since she has had difficulty with MiraLAX in the past. Future considerations could include alternative secretogogue.  She wonders if pelvic floor could be playing a role here.  She has had longstanding constipation and she has had 3 vaginal deliveries.  She is also experiencing some urinary continence as below.  We discussed the option of [facility] pelvic floor PT versus pelvic floor center.  She would prefer to start with pelvic floor PT here and referral is placed today.     2. Urinary incontinence  She has been experiencing intermittent urinary incontinence.  Referral to pelvic floor PT for this as well.    3.Chronic cough  4. Belching  Known small hiatal hernia with some acid reflux at times. New symptom of air trapping sensation within the esophagus both occurring during the day and night, both fasting and non fasting. This is a daily occurrence.  Describes as an annoying sensation in her upper chest that makes a sound that  her  can hear too. Notes it is most prominent at night when she lays down on her left side. When she rolls to her right, it goes away, and she is eventually able to roll to her left without the sound ro sensation. During these episodes she cannot belch, but other times she is able to belch. She does drink some carbonation at times. Over the last month it has subsided a bit.  She did not feel 2 months of omeprazole made any difference with this.  She has also had a chronic cough intermittently with a globus sensation that feels like a liquid sitting in the base of her neck.  She has a history of chronic sinusitis.  We will continue the workup with a pH impedance manometry study off PPI.  I will reach out to ENT to see if this could be retrograde cricopharyngeal dysfunction.       Specialty Problems          Gastroenterology Diagnoses    Diverticulosis of large intestine           RTC 3 months    It was a pleasure to participate in the care of this patient; please contact us with any further questions.  A total of 34 face-to-face minutes was spent with this patient, >50% of which was counseling regarding the above delineated issues. An additional 14 minutes was spent on the date of the encounter doing chart review, documentation, and further activities as noted above.    Carlota Rodriguez PA-C  Division of Gastroenterology, Hepatology and Nutrition  [facility]    ---------------------------------------------------------------------------------------------------  HPI:  Mercedes Patel is a 59 year old female with past medical history significant for hypertension, uterine leiomyoma, nontoxic multinodular goiter, history of chronic sinusitis who presents for chronic constipation and sensation of air trapping in the esophagus.      Initial history 5/2024  She has had constipation for 10+ years with lower abdominal pain. Uses metamucil and miralax. Feels incomplete. Has daily bowel movements. There is a CT  "abd/pelvis from 2012 showing large amount of stool throughout the colon.      She reports that 10 years ago had a 20 lb unexplained wt loss with normal EGD.      More recently, she has a sensation like she \"needs to belch but it won't come out\" intermittently. States it makes a constant sound when lays on left side at night, but when she rolls to her right it goes away. Eventually she is able to roll back to her left without producing the sound. It is not painful, but is annoying. She feels the sensation in her upper central chest. She states it will happen when she is at work upright at times too. This has been a daily occurrence for the last 6 months. She tries to stop eating at 7 pm before bed. Her  is able to hear it when it occurs. It does not seem correlated with meals. She does intermittent fasting and it will happen both nonfasting and fasting. She is otherwise able to belch, but during the times that it comes on, she cannot.      She does have occasional acid reflux, no heartburn and rare regurgitation. Several months ago she does remember waking in the night with acid and choked on the acid. This only happened once. She denies any dysphagia to solids or liquids. Denies odynophagia, globus sensation, nausea, vomiting, or any chest or abdominal pain.      She does have a chronic cough after eating. Feels an irritation in her throat that brings on the cough. It is a dry cough. Does not occur much at night. History of chronic sinusitis, not so bad now that she has been treated for environmental allergies. No history of asthma or eczema.      She reports constipation has been worse in the last year. States she has always had constipation. She tried fiber but felt it made symptoms worse. Tried both the powders and the tablets of citrucel, metamucil, flax seeds. Gets relief from miralax daily prn - typically takes for a week when constipated 1 cap per day. Did take daily for couple months, was having small " bowel movements type 5, soft, incomplete sensation and some fecal incontinence a few times in the last year. When she takes miralax, bowel movements are daily. Off miralax, she will skip a few days without a bowel movement. Typically bristol type 1 stools.      Alleviating/treatments tried -- tried avoiding different foods, avoids spicy, avoids lots of carbs      Family history -- mom with chronic constipation and had 6 feet of bowel removed (elective), maternal grandfather in 60s-70s had colon cancer, no celiac or crohns or celiac  NSAID use-- ibuprofen for aches and pains, not daily but somewhat regularly  No recent antibiotics in last 3 years, but in the past for recurrent sinusitis would be on abx every few months   Hx abd surgeries-- no   Hx colonoscopy -- 2016 at age 51, for screening. Prep was good. There was one polyp (hyperplastic) and diverticulosis present. Recommended to repeat in 2026.   EGD 2012     Interval history 8/2024:   EGD was done in June showing 2 cm HH, hill grade IV flap valve, fundic gland polyp in the stomach.  Esophagus, stomach and duodenum were normal.    She reports she has been doing better over the last month.  The only thing she changed with her routine is that she stopped eating yogurt.  She was on omeprazole 40 mg daily for 2 months but this did not seem to change anything with the belching sound.  She reports it stopped as mysteriously as it started.  She was still experiencing this noise when she would lay on her left side or in the morning when she got to work.    Does have a chronic cough intermitently. The omeprazole doesn't help with this. It comes on suddenly. Usually once per day will have a coughing spell. A lot of times feels there is some liquid sitting at the base of the neck.  She feels she may have some postnasal drip.  She denies any runny nose.  Has a history of chronic sinusitis and has been on multiple rounds of antibiotics in the past.  States she only has  "regurgitation or heartburn when she eats pizza with onions.  She takes omeprazole once as needed intermittently for this which is effective.    Bowel movements are still irregular.  She tried 1 capful of MiraLAX as needed but reports she would have incontinence on a walk and it did not seem to make her any more regular.  She has been taking 2 teaspoons of Benefiber daily which does seem to help a little bit but she has still been constipated with [place] type I stools.  She has some bloating and lower abdominal discomfort but not any worse with the Benefiber.  She has had some gas pains and tried Gas-X which was minimally helpful.    ROS:    A 10 point review of systems was performed and is negative except as noted in the HPI.       PHYSICAL EXAMINATION:  Vitals /88 (BP Location: Left arm)   Pulse 74   Resp 12   Ht 1.626 m (5' 4\")   Wt 62.7 kg (138 lb 4.8 oz)   SpO2 98%   BMI 23.74 kg/m     Wt   Wt Readings from Last 2 Encounters:   08/21/24 62.7 kg (138 lb 4.8 oz)   06/04/24 60.8 kg (134 lb)      Gen: Pt sitting up in NAD, interactive and cooperative on exam  Eyes: sclerae anicteric, no injection  Resp: Unlabored breathing  Skin: Warm, dry, no jaundice, nails appear healthy  Neuro: alert, oriented, answers questions appropriately    "

## 2024-08-21 ENCOUNTER — OFFICE VISIT (OUTPATIENT)
Dept: GASTROENTEROLOGY | Facility: CLINIC | Age: 59
End: 2024-08-21
Attending: STUDENT IN AN ORGANIZED HEALTH CARE EDUCATION/TRAINING PROGRAM
Payer: COMMERCIAL

## 2024-08-21 VITALS
HEIGHT: 64 IN | OXYGEN SATURATION: 98 % | SYSTOLIC BLOOD PRESSURE: 125 MMHG | BODY MASS INDEX: 23.61 KG/M2 | RESPIRATION RATE: 12 BRPM | DIASTOLIC BLOOD PRESSURE: 88 MMHG | WEIGHT: 138.3 LBS | HEART RATE: 74 BPM

## 2024-08-21 DIAGNOSIS — R05.3 CHRONIC COUGH: ICD-10-CM

## 2024-08-21 DIAGNOSIS — R14.2 BELCHING SYMPTOM: ICD-10-CM

## 2024-08-21 DIAGNOSIS — R32 URINARY INCONTINENCE, UNSPECIFIED TYPE: ICD-10-CM

## 2024-08-21 DIAGNOSIS — K59.09 CHRONIC CONSTIPATION: Primary | ICD-10-CM

## 2024-08-21 PROCEDURE — 99215 OFFICE O/P EST HI 40 MIN: CPT | Performed by: STUDENT IN AN ORGANIZED HEALTH CARE EDUCATION/TRAINING PROGRAM

## 2024-08-21 ASSESSMENT — PAIN SCALES - GENERAL: PAINLEVEL: NO PAIN (0)

## 2024-08-21 NOTE — NURSING NOTE
"Chief Complaint   Patient presents with    Gastrointestinal Problem       Vitals:    08/21/24 0701   BP: 125/88   BP Location: Left arm   Pulse: 74   Resp: 12   SpO2: 98%   Weight: 62.7 kg (138 lb 4.8 oz)   Height: 1.626 m (5' 4\")       Body mass index is 23.74 kg/m .    Debra Kaur RN   "

## 2024-08-21 NOTE — LETTER
8/21/2024      Mercedes Patel  [address]  [facility] [address]      Dear Colleague,    Thank you for referring your patient, Mercedes Patel, to the [facility] [place]. Please see a copy of my visit note below.    GI CLINIC VISIT    CC: follow up constipation and belching symptom      ASSESSMENT/PLAN:  1. Chronic constipation  Tends towards bristol type 1 stools and will skip days. Uses miralax 1 cap daily as needed.  When she tried to increase this she had intermittent fecal incontinence when she would go on walks.  She started on Benefiber 2 teaspoons daily which has seemed to help a little bit.  Tried other fibers in the past alone which made bloating worse. Does not typically feel she is completely evacuating.  Suspect she may have a large stool burden contributing to overflow diarrhea when she uses MiraLAX on a more regular basis.  She has had improvement with Benefiber so we will have her go up on this to 3 teaspoons daily and gently increase from there as needed.  Recommend starting on magnesium oxide 400 to 500 mg nightly initially and we will obtain an abdominal x-ray to evaluate stool burden.  If a bowel cleanse is necessary we may want to consider half cleanse or utilizing magnesium citrate since she has had difficulty with MiraLAX in the past. Future considerations could include alternative secretogogue.  She wonders if pelvic floor could be playing a role here.  She has had longstanding constipation and she has had 3 vaginal deliveries.  She is also experiencing some urinary continence as below.  We discussed the option of [facility] pelvic floor PT versus pelvic floor center.  She would prefer to start with pelvic floor PT here and referral is placed today.     2. Urinary incontinence  She has been experiencing intermittent urinary incontinence.  Referral to pelvic floor PT for this as well.    3.Chronic cough  4. Belching  Known small hiatal hernia  with some acid reflux at times. New symptom of air trapping sensation within the esophagus both occurring during the day and night, both fasting and non fasting. This is a daily occurrence.  Describes as an annoying sensation in her upper chest that makes a sound that her  can hear too. Notes it is most prominent at night when she lays down on her left side. When she rolls to her right, it goes away, and she is eventually able to roll to her left without the sound ro sensation. During these episodes she cannot belch, but other times she is able to belch. She does drink some carbonation at times. Over the last month it has subsided a bit.  She did not feel 2 months of omeprazole made any difference with this.  She has also had a chronic cough intermittently with a globus sensation that feels like a liquid sitting in the base of her neck.  She has a history of chronic sinusitis.  We will continue the workup with a pH impedance manometry study off PPI.  I will reach out to ENT to see if this could be retrograde cricopharyngeal dysfunction.       Specialty Problems          Gastroenterology Diagnoses    Diverticulosis of large intestine           RTC 3 months    It was a pleasure to participate in the care of this patient; please contact us with any further questions.  A total of 34 face-to-face minutes was spent with this patient, >50% of which was counseling regarding the above delineated issues. An additional 14 minutes was spent on the date of the encounter doing chart review, documentation, and further activities as noted above.    Carlota Rodriguez PA-C  Division of Gastroenterology, Hepatology and Nutrition  [facility]    ---------------------------------------------------------------------------------------------------  HPI:  Mercedes Patel is a 59 year old female with past medical history significant for hypertension, uterine leiomyoma, nontoxic multinodular goiter, history of chronic sinusitis  "who presents for chronic constipation and sensation of air trapping in the esophagus.      Initial history 5/2024  She has had constipation for 10+ years with lower abdominal pain. Uses metamucil and miralax. Feels incomplete. Has daily bowel movements. There is a CT abd/pelvis from 2012 showing large amount of stool throughout the colon.      She reports that 10 years ago had a 20 lb unexplained wt loss with normal EGD.      More recently, she has a sensation like she \"needs to belch but it won't come out\" intermittently. States it makes a constant sound when lays on left side at night, but when she rolls to her right it goes away. Eventually she is able to roll back to her left without producing the sound. It is not painful, but is annoying. She feels the sensation in her upper central chest. She states it will happen when she is at work upright at times too. This has been a daily occurrence for the last 6 months. She tries to stop eating at 7 pm before bed. Her  is able to hear it when it occurs. It does not seem correlated with meals. She does intermittent fasting and it will happen both nonfasting and fasting. She is otherwise able to belch, but during the times that it comes on, she cannot.      She does have occasional acid reflux, no heartburn and rare regurgitation. Several months ago she does remember waking in the night with acid and choked on the acid. This only happened once. She denies any dysphagia to solids or liquids. Denies odynophagia, globus sensation, nausea, vomiting, or any chest or abdominal pain.      She does have a chronic cough after eating. Feels an irritation in her throat that brings on the cough. It is a dry cough. Does not occur much at night. History of chronic sinusitis, not so bad now that she has been treated for environmental allergies. No history of asthma or eczema.      She reports constipation has been worse in the last year. States she has always had constipation. " She tried fiber but felt it made symptoms worse. Tried both the powders and the tablets of citrucel, metamucil, flax seeds. Gets relief from miralax daily prn - typically takes for a week when constipated 1 cap per day. Did take daily for couple months, was having small bowel movements type 5, soft, incomplete sensation and some fecal incontinence a few times in the last year. When she takes miralax, bowel movements are daily. Off miralax, she will skip a few days without a bowel movement. Typically bristol type 1 stools.      Alleviating/treatments tried -- tried avoiding different foods, avoids spicy, avoids lots of carbs      Family history -- mom with chronic constipation and had 6 feet of bowel removed (elective), maternal grandfather in 60s-70s had colon cancer, no celiac or crohns or celiac  NSAID use-- ibuprofen for aches and pains, not daily but somewhat regularly  No recent antibiotics in last 3 years, but in the past for recurrent sinusitis would be on abx every few months   Hx abd surgeries-- no   Hx colonoscopy -- 2016 at age 51, for screening. Prep was good. There was one polyp (hyperplastic) and diverticulosis present. Recommended to repeat in 2026.   EGD 2012     Interval history 8/2024:   EGD was done in June showing 2 cm HH, hill grade IV flap valve, fundic gland polyp in the stomach.  Esophagus, stomach and duodenum were normal.    She reports she has been doing better over the last month.  The only thing she changed with her routine is that she stopped eating yogurt.  She was on omeprazole 40 mg daily for 2 months but this did not seem to change anything with the belching sound.  She reports it stopped as mysteriously as it started.  She was still experiencing this noise when she would lay on her left side or in the morning when she got to work.    Does have a chronic cough intermitently. The omeprazole doesn't help with this. It comes on suddenly. Usually once per day will have a coughing spell.  "A lot of times feels there is some liquid sitting at the base of the neck.  She feels she may have some postnasal drip.  She denies any runny nose.  Has a history of chronic sinusitis and has been on multiple rounds of antibiotics in the past.  States she only has regurgitation or heartburn when she eats pizza with onions.  She takes omeprazole once as needed intermittently for this which is effective.    Bowel movements are still irregular.  She tried 1 capful of MiraLAX as needed but reports she would have incontinence on a walk and it did not seem to make her any more regular.  She has been taking 2 teaspoons of Benefiber daily which does seem to help a little bit but she has still been constipated with [place] type I stools.  She has some bloating and lower abdominal discomfort but not any worse with the Benefiber.  She has had some gas pains and tried Gas-X which was minimally helpful.    ROS:    A 10 point review of systems was performed and is negative except as noted in the HPI.       PHYSICAL EXAMINATION:  Vitals /88 (BP Location: Left arm)   Pulse 74   Resp 12   Ht 1.626 m (5' 4\")   Wt 62.7 kg (138 lb 4.8 oz)   SpO2 98%   BMI 23.74 kg/m     Wt   Wt Readings from Last 2 Encounters:   08/21/24 62.7 kg (138 lb 4.8 oz)   06/04/24 60.8 kg (134 lb)      Gen: Pt sitting up in NAD, interactive and cooperative on exam  Eyes: sclerae anicteric, no injection  Resp: Unlabored breathing  Skin: Warm, dry, no jaundice, nails appear healthy  Neuro: alert, oriented, answers questions appropriately      Again, thank you for allowing me to participate in the care of your patient.        Sincerely,        Carlota Rodriguez PA-C  "

## 2024-08-21 NOTE — PATIENT INSTRUCTIONS
It was a pleasure meeting with you today and discussing your healthcare plan. Below is a summary of what we covered:    - Start Magnesium oxide 400 mg daily at bedtime   - Benefiber - go up to 3 tsp daily, ok to take all at once   - Abdominal xray ordered: you can call [phone] to schedule  - Manometry with pH ordered: you should get a call to schedule this. Don't take any omeprazole for 7 days prior to this test or while the catheter is in place for 24 hours   - I will reach out to ENT to see if they would like to see you in clinic right away  - PT referral placed: you should get a call to schedule this   - Follow up in 3 months     Please see below for any additional questions and scheduling guidelines.    Sign up for [employer]: [employer] patient portal serves as a secure platform for accessing your medical records from the [facility]. Additionally, [employer] facilitates easy, timely, and secure messaging with your care team. If you have not signed up, you may do so by using the provided code or calling [phone].    Coordinating your care after your visit:  There are multiple options for scheduling your follow-up care based on your provider's recommendation.    How do I schedule a follow-up clinic appointment:   After your appointment, you may receive scheduling assistance with the Clinic Coordinators by having a seat in the waiting room and a Clinic Coordinator will call you up to schedule.  Virtual visits or after you leave the clinic:  Your provider has placed a follow-up order in the [employer] portal for scheduling your return appointment. A member of the scheduling team will contact you to schedule.  [facility] Scheduling: Timely scheduling through [employer] is advised to ensure appointment availability.   Call to schedule: You may schedule your follow-up appointment(s) by calling [phone], option 1.    How do I schedule my endoscopy or colonoscopy procedure:  If a procedure, such as a colonoscopy  or upper endoscopy was ordered by your provider, the scheduling team will contact you to schedule this procedure. Or you may choose to call to schedule at   [phone], option 2.  Please allow 20-30 minutes when scheduling a procedure.    How do I get my blood work done? To get your blood work done, you need to schedule a lab appointment at an [facility]. There are multiple ways to schedule:   At the clinic: The Clinic Coordinator you meet after your visit can help you schedule a lab appointment.   [email or web address] scheduling: [email or web address] offers online lab scheduling at all [facility] laboratory locations.   Call to schedule: You can call [phone] to schedule your lab appointment.    How do I schedule my imaging study: To schedule imaging studies, such as CT scans, ultrasounds, MRIs, or X-rays, contact Imaging Services at [phone].    How do I schedule a referral to another doctor: If your provider recommended a referral to another specialist(s), the referral order was placed by your provider. You will receive a phone call to schedule this referral, or you may choose to call the number attached to the referral to self-schedule.    For Post-Visit Question(s):  For any inquiries following today's visit:  Please utilize [email or web address] messaging and allow 48 hours for reply or contact the Call Center during normal business hours at [phone], option 3.  For Emergent After-hours questions, contact the On-Call GI Fellow through the [facility] at [phone].  In addition, you may contact your Nurse directly using the provided contact information.    Test Results:  Test results will be accessible via [email or web address] in compliance with the 21st Century Cures Act. This means that your results will be available to you at the same time as your provider. Often you may see your results before your provider does. Results are reviewed by staff within two weeks with communication follow-up. Results  may be released in the patient portal prior to your care team review.    Prescription Refill(s):  Medication prescribed by your provider will be addressed during your visit. For future refills, please coordinate with your pharmacy. If you have not had a recent clinic visit or routine labs, for your safety, your provider may not be able to refill your prescription.

## 2024-08-22 ENCOUNTER — MYC MEDICAL ADVICE (OUTPATIENT)
Dept: OTOLARYNGOLOGY | Facility: CLINIC | Age: 59
End: 2024-08-22
Payer: COMMERCIAL

## 2024-08-23 ENCOUNTER — ANCILLARY PROCEDURE (OUTPATIENT)
Dept: GENERAL RADIOLOGY | Facility: CLINIC | Age: 59
End: 2024-08-23
Attending: STUDENT IN AN ORGANIZED HEALTH CARE EDUCATION/TRAINING PROGRAM
Payer: COMMERCIAL

## 2024-08-23 DIAGNOSIS — K59.09 CHRONIC CONSTIPATION: ICD-10-CM

## 2024-08-23 PROCEDURE — 74018 RADEX ABDOMEN 1 VIEW: CPT | Performed by: RADIOLOGY

## 2024-08-29 ENCOUNTER — TELEPHONE (OUTPATIENT)
Dept: GASTROENTEROLOGY | Facility: CLINIC | Age: 59
End: 2024-08-29
Payer: COMMERCIAL

## 2024-08-29 NOTE — TELEPHONE ENCOUNTER
Left Voicemail (1st Attempt) and Sent Mychart (1st Attempt) for the patient to call back and schedule the following:    Appointment type: Return GI  Provider: Carlota Rodriguez   Return date: 3 mo (around 11/21/24)  Specialty phone number: [phone]  Additional appointment(s) needed: NA  Additonal Notes: LVM/Chantelle x1

## 2024-09-12 ENCOUNTER — TELEPHONE (OUTPATIENT)
Dept: GASTROENTEROLOGY | Facility: CLINIC | Age: 59
End: 2024-09-12
Payer: COMMERCIAL

## 2024-09-12 NOTE — TELEPHONE ENCOUNTER
Non-Invasive GI Procedure Scheduling Questionnaire    Scheduling Reminders:  Add-on within 2 weeks require clinic approval (Manometry & HBT)  Manometry requires an in-person interrupter (not needed for HBT)      Have you had a positive Covid test in the last 14 days?  No    Are you active on MyChart?   Yes    What insurance is in the chart?  Other:  [facility]    Ordering/Referring Provider:     ALEX DAWSON      (If ordering provider performs procedure, schedule with ordering provider unless otherwise instructed. )    (Females) Are you currently pregnant?     Have you ever had or are you awaiting a heart or lung transplant? No - Schedule next available.    Do you need assistance transferring? No - Continue scheduling.    Do you take acid reflux medication or proton-pump inhibitors (PPI)? No - Continue scheduling.    Patient Reminders:  Please inform patients to review instructions sent via [employer] or letter two weeks before procedure.  The Manometry exam may take up to 90 minutes.  The Breath Test exam may take up to 4 hours.

## 2024-09-19 ENCOUNTER — MYC MEDICAL ADVICE (OUTPATIENT)
Dept: FAMILY MEDICINE | Facility: CLINIC | Age: 59
End: 2024-09-19
Payer: COMMERCIAL

## 2024-09-30 SDOH — HEALTH STABILITY: PHYSICAL HEALTH: ON AVERAGE, HOW MANY DAYS PER WEEK DO YOU ENGAGE IN MODERATE TO STRENUOUS EXERCISE (LIKE A BRISK WALK)?: 6 DAYS

## 2024-09-30 SDOH — HEALTH STABILITY: PHYSICAL HEALTH: ON AVERAGE, HOW MANY MINUTES DO YOU ENGAGE IN EXERCISE AT THIS LEVEL?: 40 MIN

## 2024-09-30 ASSESSMENT — SOCIAL DETERMINANTS OF HEALTH (SDOH): HOW OFTEN DO YOU GET TOGETHER WITH FRIENDS OR RELATIVES?: ONCE A WEEK

## 2024-10-01 NOTE — TELEPHONE ENCOUNTER
"FUTURE VISIT INFORMATION      FUTURE VISIT INFORMATION:  Date: 12/17/24  Time: 3:30 PM  Location: [facility] [place] - ENT  REFERRAL INFORMATION:  Referring provider:    Referring providers clinic:    Reason for visit/diagnosis:  Evaluation     RECORDS REQUESTED FROM      Clinic name Comments Records Status Imaging Status   [facility]  8/21/24 OV notes- Carlota Rodriguez PA-C  Epic    [facility] Procedure 6/4/24 Upper GI Endoscopy [facility] 10/26/18 US thyroid Epic PACS           \"Please notify/message CSS if patient completed outside imaging prior to scheduled appointment and/or any outside records that might have been missed at pre visit -Thanks\"  "

## 2024-10-04 ENCOUNTER — OFFICE VISIT (OUTPATIENT)
Dept: FAMILY MEDICINE | Facility: CLINIC | Age: 59
End: 2024-10-04
Payer: COMMERCIAL

## 2024-10-04 VITALS
SYSTOLIC BLOOD PRESSURE: 100 MMHG | WEIGHT: 136.8 LBS | BODY MASS INDEX: 24.24 KG/M2 | OXYGEN SATURATION: 99 % | HEIGHT: 63 IN | RESPIRATION RATE: 21 BRPM | DIASTOLIC BLOOD PRESSURE: 70 MMHG | TEMPERATURE: 97.6 F | HEART RATE: 71 BPM

## 2024-10-04 DIAGNOSIS — E04.2 NONTOXIC MULTINODULAR GOITER: ICD-10-CM

## 2024-10-04 DIAGNOSIS — N95.1 MENOPAUSAL SYNDROME (HOT FLASHES): ICD-10-CM

## 2024-10-04 DIAGNOSIS — I10 BENIGN ESSENTIAL HYPERTENSION WITH TARGET BLOOD PRESSURE BELOW 140/90: ICD-10-CM

## 2024-10-04 DIAGNOSIS — Z00.00 ROUTINE GENERAL MEDICAL EXAMINATION AT A HEALTH CARE FACILITY: Primary | ICD-10-CM

## 2024-10-04 DIAGNOSIS — E78.2 MIXED HYPERLIPIDEMIA: ICD-10-CM

## 2024-10-04 LAB
ALBUMIN SERPL BCG-MCNC: 4.7 G/DL (ref 3.5–5.2)
ALP SERPL-CCNC: 93 U/L (ref 40–150)
ALT SERPL W P-5'-P-CCNC: 15 U/L (ref 0–50)
ANION GAP SERPL CALCULATED.3IONS-SCNC: 11 MMOL/L (ref 7–15)
AST SERPL W P-5'-P-CCNC: 20 U/L (ref 0–45)
BILIRUB SERPL-MCNC: 0.4 MG/DL
BUN SERPL-MCNC: 14.4 MG/DL (ref 8–23)
CALCIUM SERPL-MCNC: 9.5 MG/DL (ref 8.8–10.4)
CHLORIDE SERPL-SCNC: 102 MMOL/L (ref 98–107)
CREAT SERPL-MCNC: 0.78 MG/DL (ref 0.51–0.95)
EGFRCR SERPLBLD CKD-EPI 2021: 87 ML/MIN/1.73M2
FASTING STATUS PATIENT QL REPORTED: NORMAL
GLUCOSE SERPL-MCNC: 94 MG/DL (ref 70–99)
HCO3 SERPL-SCNC: 23 MMOL/L (ref 22–29)
POTASSIUM SERPL-SCNC: 4.8 MMOL/L (ref 3.4–5.3)
PROT SERPL-MCNC: 7.2 G/DL (ref 6.4–8.3)
SODIUM SERPL-SCNC: 136 MMOL/L (ref 135–145)
TSH SERPL DL<=0.005 MIU/L-ACNC: 1.33 UIU/ML (ref 0.3–4.2)

## 2024-10-04 PROCEDURE — 36415 COLL VENOUS BLD VENIPUNCTURE: CPT | Performed by: FAMILY MEDICINE

## 2024-10-04 PROCEDURE — 84443 ASSAY THYROID STIM HORMONE: CPT | Performed by: FAMILY MEDICINE

## 2024-10-04 PROCEDURE — 80053 COMPREHEN METABOLIC PANEL: CPT | Performed by: FAMILY MEDICINE

## 2024-10-04 PROCEDURE — 99396 PREV VISIT EST AGE 40-64: CPT | Performed by: FAMILY MEDICINE

## 2024-10-04 PROCEDURE — 99214 OFFICE O/P EST MOD 30 MIN: CPT | Mod: 25 | Performed by: FAMILY MEDICINE

## 2024-10-04 PROCEDURE — 80061 LIPID PANEL: CPT | Performed by: FAMILY MEDICINE

## 2024-10-04 RX ORDER — CALCIUM CARBONATE/VITAMIN D3 500-10/5ML
1 LIQUID (ML) ORAL DAILY
COMMUNITY

## 2024-10-04 RX ORDER — POLYETHYLENE GLYCOL 3350 17 G/17G
1 POWDER, FOR SOLUTION ORAL DAILY
COMMUNITY

## 2024-10-04 RX ORDER — LISINOPRIL 10 MG/1
10 TABLET ORAL DAILY
Qty: 90 TABLET | Refills: 3 | Status: CANCELLED | OUTPATIENT
Start: 2024-10-04

## 2024-10-04 RX ORDER — ROSUVASTATIN CALCIUM 5 MG/1
5 TABLET, COATED ORAL DAILY
Qty: 90 TABLET | Refills: 3 | Status: CANCELLED | OUTPATIENT
Start: 2024-10-04

## 2024-10-04 RX ORDER — VENLAFAXINE HYDROCHLORIDE 75 MG/1
75 CAPSULE, EXTENDED RELEASE ORAL DAILY
Qty: 90 CAPSULE | Refills: 3 | Status: CANCELLED | OUTPATIENT
Start: 2024-10-04

## 2024-10-04 RX ORDER — FEXOFENADINE HCL 180 MG/1
180 TABLET ORAL DAILY
COMMUNITY

## 2024-10-04 ASSESSMENT — PAIN SCALES - GENERAL: PAINLEVEL: MILD PAIN (3)

## 2024-10-04 NOTE — PATIENT INSTRUCTIONS
Patient Education   Preventive Care Advice   This is general advice given by our system to help you stay healthy. However, your care team may have specific advice just for you. Please talk to your care team about your preventive care needs.  Nutrition  Eat 5 or more servings of fruits and vegetables each day.  Try wheat bread, brown rice and whole grain pasta (instead of white bread, rice, and pasta).  Get enough calcium and vitamin D. Check the label on foods and aim for 100% of the RDA (recommended daily allowance).  Lifestyle  Exercise at least 150 minutes each week  (30 minutes a day, 5 days a week).  Do muscle strengthening activities 2 days a week. These help control your weight and prevent disease.  No smoking.  Wear sunscreen to prevent skin cancer.  Have a dental exam and cleaning every 6 months.  Yearly exams  See your health care team every year to talk about:  Any changes in your health.  Any medicines your care team has prescribed.  Preventive care, family planning, and ways to prevent chronic diseases.  Shots (vaccines)   HPV shots (up to age 26), if you've never had them before.  Hepatitis B shots (up to age 59), if you've never had them before.  COVID-19 shot: Get this shot when it's due.  Flu shot: Get a flu shot every year.  Tetanus shot: Get a tetanus shot every 10 years.  Pneumococcal, hepatitis A, and RSV shots: Ask your care team if you need these based on your risk.  Shingles shot (for age 50 and up)  General health tests  Diabetes screening:  Starting at age 35, Get screened for diabetes at least every 3 years.  If you are younger than age 35, ask your care team if you should be screened for diabetes.  Cholesterol test: At age 39, start having a cholesterol test every 5 years, or more often if advised.  Bone density scan (DEXA): At age 50, ask your care team if you should have this scan for osteoporosis (brittle bones).  Hepatitis C: Get tested at least once in your life.  STIs (sexually  transmitted infections)  Before age 24: Ask your care team if you should be screened for STIs.  After age 24: Get screened for STIs if you're at risk. You are at risk for STIs (including HIV) if:  You are sexually active with more than one person.  You don't use condoms every time.  You or a partner was diagnosed with a sexually transmitted infection.  If you are at risk for HIV, ask about PrEP medicine to prevent HIV.  Get tested for HIV at least once in your life, whether you are at risk for HIV or not.  Cancer screening tests  Cervical cancer screening: If you have a cervix, begin getting regular cervical cancer screening tests starting at age 21.  Breast cancer scan (mammogram): If you've ever had breasts, begin having regular mammograms starting at age 40. This is a scan to check for breast cancer.  Colon cancer screening: It is important to start screening for colon cancer at age 45.  Have a colonoscopy test every 10 years (or more often if you're at risk) Or, ask your provider about stool tests like a FIT test every year or Cologuard test every 3 years.  To learn more about your testing options, visit:   .  For help making a decision, visit:   https://bit.ly/io07047.  Prostate cancer screening test: If you have a prostate, ask your care team if a prostate cancer screening test (PSA) at age 55 is right for you.  Lung cancer screening: If you are a current or former smoker ages 50 to 80, ask your care team if ongoing lung cancer screenings are right for you.  For informational purposes only. Not to replace the advice of your health care provider. Copyright   2023 [employer]. All rights reserved. Clinically reviewed by the [facility]. [employer] [id number] - REV 01/24.

## 2024-10-04 NOTE — PROGRESS NOTES
Preventive Care Visit  [facility] [place]  Harika Montoya MD, Family Medicine  Oct 4, 2024      Assessment & Plan     (Z00.00) Routine general medical examination at a health care facility  (primary encounter diagnosis)    (I10) Benign essential hypertension with target blood pressure below 140/90  Comment: At goal  The current medical regimen is effective;  continue present plan and medications.      (E78.2) Mixed hyperlipidemia  Comment: At goal  The current medical regimen is effective;  continue present plan and medications.    LDL Cholesterol Calculated   Date Value Ref Range Status   09/29/2023 71 <=100 mg/dL Final   05/19/2021 209 (H) <100 mg/dL Final     Comment:     Above desirable:  100-129 mg/dl  Borderline High:  130-159 mg/dL  High:             160-189 mg/dL  Very high:       >189 mg/dl       (N95.1) Menopausal syndrome (hot flashes)  Hot flashes and insomnia have improved dramatically since starting Effexor. Continue current medication at same dose.    (E04.2) Nontoxic multinodular goiter  Comment: not on medication  TSH   Date Value Ref Range Status   09/29/2023 1.56 0.30 - 4.20 uIU/mL Final   12/17/2021 0.88 0.40 - 4.00 mU/L Final   11/24/2020 1.17 0.40 - 4.00 mU/L Final   Plan: At goal    Patient has been advised of split billing requirements and indicates understanding: Yes        Counseling  Appropriate preventive services were addressed with this patient via screening, questionnaire, or discussion as appropriate for fall prevention, nutrition, physical activity, Tobacco-use cessation, social engagement, weight loss and cognition.  Checklist reviewing preventive services available has been given to the patient.  Reviewed patient's diet, addressing concerns and/or questions.   She is at risk for psychosocial distress and has been provided with information to reduce risk.     Anthony Long is a 59 year old, presenting for the following:  Physical        10/4/2024     10:05 AM   Additional Questions   Roomed by THOR Cortes   Accompanied by Self        Health Care Directive  Patient does not have a Health Care Directive or Living Will: Discussed advance care planning with patient; information given to patient to review.    HPI    Hyperlipidemia Follow-Up    Are you regularly taking any medication or supplement to lower your cholesterol?   Yes- rosuvastatin 5 mg daily  Are you having muscle aches or other side effects that you think could be caused by your cholesterol lowering medication?  No    Hypertension Follow-up    Do you check your blood pressure regularly outside of the clinic? Yes   Are you following a low salt diet? Yes  Are your blood pressures ever more than 140 on the top number (systolic) OR more   than 90 on the bottom number (diastolic), for example 140/90? No    Multinodular goiter Follow-up    Since last visit, patient describes the following symptoms: Weight stable, no hair loss, no skin changes, no constipation, no loose stools  Onset in her 20's, was getting regular US of her neck, has had some biopsies, all benign, never on medication.        9/30/2024   General Health   How would you rate your overall physical health? Good   Feel stress (tense, anxious, or unable to sleep) Only a little      (!) STRESS CONCERN      9/30/2024   Nutrition   Three or more servings of calcium each day? (!) I DON'T KNOW   Diet: Low salt   How many servings of fruit and vegetables per day? (!) 2-3   How many sweetened beverages each day? 0-1            9/30/2024   Exercise   Days per week of moderate/strenous exercise 6 days   Average minutes spent exercising at this level 40 min            9/30/2024   Social Factors   Frequency of gathering with friends or relatives Once a week   Worry food won't last until get money to buy more No   Food not last or not have enough money for food? No   Do you have housing? (Housing is defined as stable permanent housing and does not include  staying ouside in a car, in a tent, in an abandoned building, in an overnight shelter, or couch-surfing.) Yes   Are you worried about losing your housing? No   Lack of transportation? No   Unable to get utilities (heat,electricity)? No            9/30/2024   Fall Risk   Fallen 2 or more times in the past year? No   Trouble with walking or balance? No             9/30/2024   Dental   Dentist two times every year? Yes            9/30/2024   TB Screening   Were you born outside of the [place]? No              Today's PHQ-2 Score:       5/17/2024    10:49 AM   PHQ-2 ( 1999 [employer])   Q1: Little interest or pleasure in doing things 0   Q2: Feeling down, depressed or hopeless 0   PHQ-2 Score 0         9/30/2024   Substance Use   Alcohol more than 3/day or more than 7/wk No   Do you use any other substances recreationally? No        Social History     Tobacco Use    Smoking status: Never    Smokeless tobacco: Never   Vaping Use    Vaping status: Never Used   Substance Use Topics    Alcohol use: Yes     Comment: minimal    Drug use: No           11/27/2023   LAST FHS-7 RESULTS   1st degree relative breast or ovarian cancer No   Any relative bilateral breast cancer No   Any male have breast cancer No   Any ONE woman have BOTH breast AND ovarian cancer No   Any woman with breast cancer before 50yrs No   2 or more relatives with breast AND/OR ovarian cancer No   2 or more relatives with breast AND/OR bowel cancer No           Mammogram Screening - Mammogram every 1-2 years updated in Health Maintenance based on mutual decision making        9/30/2024   STI Screening   New sexual partner(s) since last STI/HIV test? No        History of abnormal Pap smear: No - age 30-64 HPV with reflex Pap every 5 years recommended        Latest Ref Rng & Units 12/17/2021    11:27 AM 5/9/2014    12:00 AM 11/30/2011    11:08 AM   PAP / HPV   PAP  Negative for Intraepithelial Lesion or Malignancy (NILM)      PAP (Historical)   NIL  NIL    HPV 16 DNA  Negative Negative      HPV 18 DNA Negative Negative      Other HR HPV Negative Negative        ASCVD Risk   The 10-year ASCVD risk score (Marcy PHELPS, et al., 2019) is: 1.6%    Values used to calculate the score:      Age: 59 years      Sex: Female      Is Non- : No      Diabetic: No      Tobacco smoker: No      Systolic Blood Pressure: 100 mmHg      Is BP treated: Yes      HDL Cholesterol: 84 mg/dL      Total Cholesterol: 173 mg/dL       Reviewed and updated as needed this visit by Provider   Tobacco     Med Hx  Surg Hx  Fam Hx  Soc Hx Sexual Activity          Past Medical History:   Diagnosis Date    Acne     Allergic rhinitis     Benign essential hypertension     Chronic fatigue     Constipation     Disorders of bursae and tendons in shoulder region, unspecified     Lt shoulder s/p steroid injection 7/02    GERD (gastroesophageal reflux disease) 04/2009    Headache(784.0)     with recurrent sinusitis and allergies,Dr. Traore ENT     Insomnia     Leiomyoma of uterus, unspecified 02/2006    2 small fibroids noted on pelvic us    Menorrhagia 2008    tx'd with ablation    Nontoxic multinodular goiter     followed by Dr. Raymundo.  Biopsy nl    Other acne     derm Dr. Ventura, started spironolactone 3/08    Perennial allergic rhinitis 2006    gets allergy injections     Past Surgical History:   Procedure Laterality Date    BIOPSY      thyroid    COLONOSCOPY  7/1/16    ENDOSCOPY      ESOPHAGOSCOPY, GASTROSCOPY, DUODENOSCOPY (EGD), COMBINED N/A 6/4/2024    Procedure: ESOPHAGOGASTRODUODENOSCOPY, WITH POLYPECTOMY;  Surgeon: Melisa Reynoso MD;  Location: [facility] [place] OR     CT MAXILLOFACIAL W/O CONTRAST  01/01/2006    chronic sinus disease    HC MRI BRAIN W/O CONTRAST  01/01/2004    MRI brain pos sinusitis    HC TOOTH EXTRACTION W/FORCEP  1984    HC US SOFT TISSUE HEAD/NECK  2/08, 3/10    thyroid 3 nodules unchanged, f/b endocrine, s/p FNA #4 isthmus nodule benign 9/10     "HYSTEROSCOPY,ABLATION ENDOMETRIUM  2008        SONO PELVIS COMPLETE  , 3/08,    fibroids    TONSILLECTOMY      US ABDOMEN LIMITED PORTABLE  2011    nl     OB History    Para Term  AB Living   3 3 3 0 0 3   SAB IAB Ectopic Multiple Live Births   0 0 0 0 3      # Outcome Date GA Lbr Perry/2nd Weight Sex Type Anes PTL Lv   3 Term      Vag-Spont   LYNDSAY   2 Term      Vag-Spont   LYNDSAY   1 Term      Vag-Spont   LYNDSAY     Review of Systems  Constitutional, neuro, ENT, endocrine, pulmonary, cardiac, gastrointestinal, genitourinary, musculoskeletal, integument and psychiatric systems are negative, except as otherwise noted.     Objective    Exam  /70 (BP Location: Right arm, Patient Position: Sitting, Cuff Size: Adult Regular)   Pulse 71   Temp 97.6  F (36.4  C) (Temporal)   Resp 21   Ht 1.6 m (5' 3\")   Wt 62.1 kg (136 lb 12.8 oz)   SpO2 99%   BMI 24.23 kg/m     Estimated body mass index is 24.23 kg/m  as calculated from the following:    Height as of this encounter: 1.6 m (5' 3\").    Weight as of this encounter: 62.1 kg (136 lb 12.8 oz).    Physical Exam  GENERAL: alert and no distress  NECK: no adenopathy, no asymmetry, masses, or scars  RESP: lungs clear to auscultation - no rales, rhonchi or wheezes  CV: regular rate and rhythm, normal S1 S2, no S3 or S4, no murmur, click or rub, no peripheral edema  ABDOMEN: soft, nontender, no hepatosplenomegaly, no masses and bowel sounds normal  MS: no gross musculoskeletal defects noted, no edema  SKIN: no suspicious lesions or rashes  NEURO: Normal strength and tone, mentation intact and speech normal  BACK: no CVA tenderness, no paralumbar tenderness  PSYCH: mentation appears normal, affect normal/bright        Signed Electronically by: Harika Montoya MD    "

## 2024-10-05 LAB
CHOLEST SERPL-MCNC: 173 MG/DL
FASTING STATUS PATIENT QL REPORTED: NORMAL
HDLC SERPL-MCNC: 84 MG/DL
LDLC SERPL CALC-MCNC: 72 MG/DL
NONHDLC SERPL-MCNC: 89 MG/DL
TRIGL SERPL-MCNC: 83 MG/DL

## 2024-10-08 NOTE — RESULT ENCOUNTER NOTE
Thank you very much for getting labs done! Everything looks normal/stable.    Sincerely,  10/8/2024

## 2024-10-10 ENCOUNTER — MYC REFILL (OUTPATIENT)
Dept: FAMILY MEDICINE | Facility: CLINIC | Age: 59
End: 2024-10-10
Payer: COMMERCIAL

## 2024-10-10 DIAGNOSIS — I10 BENIGN ESSENTIAL HYPERTENSION WITH TARGET BLOOD PRESSURE BELOW 140/90: ICD-10-CM

## 2024-10-10 DIAGNOSIS — E78.2 MIXED HYPERLIPIDEMIA: ICD-10-CM

## 2024-10-10 DIAGNOSIS — N95.1 MENOPAUSAL SYNDROME (HOT FLASHES): ICD-10-CM

## 2024-10-10 RX ORDER — LISINOPRIL 10 MG/1
10 TABLET ORAL DAILY
Qty: 90 TABLET | Refills: 3 | OUTPATIENT
Start: 2024-10-10

## 2024-10-10 RX ORDER — ROSUVASTATIN CALCIUM 5 MG/1
5 TABLET, COATED ORAL DAILY
Qty: 90 TABLET | Refills: 3 | Status: SHIPPED | OUTPATIENT
Start: 2024-10-10

## 2024-10-10 RX ORDER — VENLAFAXINE HYDROCHLORIDE 75 MG/1
75 CAPSULE, EXTENDED RELEASE ORAL DAILY
Qty: 90 CAPSULE | Refills: 3 | Status: SHIPPED | OUTPATIENT
Start: 2024-10-10

## 2024-10-29 DIAGNOSIS — E78.2 MIXED HYPERLIPIDEMIA: ICD-10-CM

## 2024-10-29 DIAGNOSIS — I10 BENIGN ESSENTIAL HYPERTENSION WITH TARGET BLOOD PRESSURE BELOW 140/90: ICD-10-CM

## 2024-10-29 RX ORDER — LISINOPRIL 10 MG/1
10 TABLET ORAL DAILY
Qty: 90 TABLET | Refills: 2 | Status: SHIPPED | OUTPATIENT
Start: 2024-10-29

## 2024-10-29 RX ORDER — ROSUVASTATIN CALCIUM 5 MG/1
5 TABLET, COATED ORAL DAILY
Qty: 90 TABLET | Refills: 3 | OUTPATIENT
Start: 2024-10-29

## 2024-12-12 ENCOUNTER — OFFICE VISIT (OUTPATIENT)
Dept: GASTROENTEROLOGY | Facility: CLINIC | Age: 59
End: 2024-12-12
Attending: STUDENT IN AN ORGANIZED HEALTH CARE EDUCATION/TRAINING PROGRAM
Payer: COMMERCIAL

## 2024-12-12 VITALS — DIASTOLIC BLOOD PRESSURE: 84 MMHG | HEART RATE: 77 BPM | SYSTOLIC BLOOD PRESSURE: 123 MMHG | OXYGEN SATURATION: 98 %

## 2024-12-12 DIAGNOSIS — R05.3 CHRONIC COUGH: ICD-10-CM

## 2024-12-12 DIAGNOSIS — R14.2 BELCHING SYMPTOM: ICD-10-CM

## 2024-12-12 DIAGNOSIS — K21.9 GASTROESOPHAGEAL REFLUX DISEASE WITHOUT ESOPHAGITIS: Primary | ICD-10-CM

## 2024-12-12 PROCEDURE — 91010 ESOPHAGUS MOTILITY STUDY: CPT | Performed by: INTERNAL MEDICINE

## 2024-12-12 PROCEDURE — 91037 ESOPH IMPED FUNCTION TEST: CPT | Performed by: INTERNAL MEDICINE

## 2024-12-12 ASSESSMENT — PAIN SCALES - GENERAL: PAINLEVEL_OUTOF10: NO PAIN (0)

## 2024-12-12 NOTE — PROGRESS NOTES
Non-Invasive GI Procedure Visit    Mercedes Patel is a 59 year old female with history of    Chronic cough  Belching symptom.   Patient stated reason for procedure:  Cough/Regurgitation      COVID-19 PCR Results          8/14/2023    13:23   COVID-19 PCR Results   SARS CoV2 PCR Negative      COVID-19 Antibody Results, Testing for Immunity           No data to display                Pre-Procedure Assessment  Patient presents to clinic today for Esophageal Manometry Study with pH    Referring Provider: Carlota Rodriguez PA-C  Patient has undergone previous endoscopy.    Does patient report taking a PPI (omeprazole, pantoprazole, rabeprazole, lansoprazole, esomeprazole, dexlansoprazole)? No  Does patient report taking a H2 blocker (ranitidine, or famotidine)? No - Was previously taking OTC Omeprazole 20mg as needed  Does patient report taking opioids? No  Patient reported that last food and/or drink was last consumed 15 hours ago.  Esophageal Questionnaire(s) Completed: Yes -   Esophageal Questionnaire(s)    BEDQ Questionnaire      12/7/2024     9:18 AM   BEDQ Questionnaire: How Often Have You Had the Following?   Trouble eating solid food (meat, bread, vegetables) 0    Trouble eating soft foods (yogurt, jello, pudding) 0    Trouble swallowing liquids 0    Pain while swallowing 0    Coughing or choking while swallowing foods or liquids 2    Total Score: 2        Patient-reported         12/7/2024     9:18 AM   BEDQ Questionnaire: Discomfort/Pain Ratings   Eating solid food (meat, bread, vegetables) 0    Eating soft foods (yogurt, jello, pudding) 0    Drinking liquid 0    Total Score: 0        Patient-reported       Eckardt Questionnaire      12/7/2024     9:18 AM   Eckardt Questionnaire   Dysphagia 0    Regurgitation 1    Retrosternal Pain 0    Weight Loss (kg) 0    Total Score:  1        Patient-reported       Promis 10 Questionnaire      12/7/2024     9:19 AM   PROMIS 10 FLOWSHEET DATA   In general, would you say your  health is: 3    In general, would you say your quality of life is: 3    In general, how would you rate your physical health? 3    In general, how would you rate your mental health, including your mood and your ability to think? 3    In general, how would you rate your satisfaction with your social activities and relationships? 3    In general, please rate how well you carry out your usual social activities and roles. (This includes activities at home, at work and in your community, and responsibilities as a parent, child, spouse, employee, friend, etc.) 3    To what extent are you able to carry out your everyday physical activities such as walking, climbing stairs, carrying groceries, or moving a chair? 4    In the past 7 days, how often have you been bothered by emotional problems such as feeling anxious, depressed, or irritable? 3    In the past 7 days, how would you rate your fatigue on average? 2    In the past 7 days, how would you rate your pain on average, where 0 means no pain, and 10 means worst imaginable pain? 5    Mental health question re-calculation - no clinical value 3    Physical health question re-calculation - no clinical value 4    Pain question re-calculation - no clinical value 3    Global Mental Health Score 12    Global Physical Health Score 14    PROMIS TOTAL - SUBSCORES 26        Patient-reported   .    Patient Hx  Patient's history, medications and allergies were reviewed.     Height: Data Unavailable   Weight: 0 lbs 0 oz    Patient Active Problem List    Diagnosis Date Noted    Benign essential hypertension with target blood pressure below 140/90 11/21/2023     Priority: Medium    Mixed hyperlipidemia 12/17/2021     Priority: Medium    Menopausal syndrome (hot flashes) 12/17/2021     Priority: Medium    Diverticulosis of large intestine 11/23/2020     Priority: Medium    Adult acne 05/24/2018     Priority: Medium    Chronic right-sided low back pain with right-sided sciatica 02/27/2017      Priority: Medium    Leiomyoma of uterus      Priority: Medium     2 small fibroids noted on pelvic us  Problem list name updated by automated process. Provider to review      History of chronic sinusitis      Priority: Medium     chronic, recurrent sinusitis, sinus disease noted on ct, mri  Problem list name updated by automated process. Provider to review      Nontoxic multinodular goiter 04/21/2004     Priority: Medium     followed by Dr. Villalta s/p bx nl        Prior to Admission medications    Medication Sig Start Date End Date Taking? Authorizing Provider   azithromycin (ZITHROMAX) 500 MG tablet Take 250 mg by mouth three times a week. For patient's eye allergies. ( Dry eyes )    Reported, Patient   Cholecalciferol (VITAMIN D) 1000 UNITS capsule Take 2 capsules by mouth daily     Reported, Patient   fexofenadine (ALLEGRA) 180 MG tablet Take 180 mg by mouth daily.    Reported, Patient   lisinopril (ZESTRIL) 10 MG tablet TAKE 1 TABLET (10 MG) BY MOUTH DAILY 10/29/24   Harika Montoya MD   loteprednol (LOTEMAX) 0.5 % ophthalmic suspension  3/6/23   Reported, Patient   magnesium oxide 400 MG CAPS Take 1 capsule by mouth daily.    Reported, Patient   polyethylene glycol (MIRALAX) 17 GM/Dose powder Take 1 Capful by mouth daily.    Reported, Patient   rosuvastatin (CRESTOR) 5 MG tablet Take 1 tablet (5 mg) by mouth daily. 10/10/24   Harika Montoya MD   venlafaxine (EFFEXOR XR) 75 MG 24 hr capsule Take 1 capsule (75 mg) by mouth daily. 10/10/24   Harika Montoya MD     Allergies   Allergen Reactions    Erythromycin Rash    Penicillins Rash    Seasonal Allergies     Sulfa Antibiotics Rash     Past Medical History:   Diagnosis Date    Acne     Allergic rhinitis     Benign essential hypertension     Chronic fatigue     Constipation     Disorders of bursae and tendons in shoulder region, unspecified     Lt shoulder s/p steroid injection 7/02    GERD (gastroesophageal reflux disease) 04/2009     Headache(784.0)     with recurrent sinusitis and allergies,Dr. Traore ENT     Insomnia     Leiomyoma of uterus, unspecified 02/2006    2 small fibroids noted on pelvic us    Menorrhagia 2008    tx'd with ablation    Nontoxic multinodular goiter     followed by Dr. Raymundo.  Biopsy nl    Other acne     derm Dr. Ventura, started spironolactone 3/08    Perennial allergic rhinitis 2006    gets allergy injections     Past Surgical History:   Procedure Laterality Date    BIOPSY      thyroid    COLONOSCOPY  7/1/16    ENDOSCOPY      ESOPHAGOSCOPY, GASTROSCOPY, DUODENOSCOPY (EGD), COMBINED N/A 6/4/2024    Procedure: ESOPHAGOGASTRODUODENOSCOPY, WITH POLYPECTOMY;  Surgeon: Melisa Reynoso MD;  Location: [facility] OR    HC CT MAXILLOFACIAL W/O CONTRAST  01/01/2006    chronic sinus disease    HC MRI BRAIN W/O CONTRAST  01/01/2004    MRI brain pos sinusitis    HC TOOTH EXTRACTION W/FORCEP  1984    HC US SOFT TISSUE HEAD/NECK  2/08, 3/10    thyroid 3 nodules unchanged, f/b endocrine, s/p FNA #4 isthmus nodule benign 9/10    HYSTEROSCOPY,ABLATION ENDOMETRIUM  01/01/2008        SONO PELVIS COMPLETE  2/06, 3/08,12/11    fibroids    TONSILLECTOMY      US ABDOMEN LIMITED PORTABLE  01/01/2011    nl     Family History   Problem Relation Age of Onset    Lipids Mother     Hypertension Mother     Hyperlipidemia Mother     Hypertension Father     Lipids Father     Blood Disease Father         clotting d/o with factor V leiden-pt negative    Coronary Artery Disease Father 85        2 vessel, d age 90    Hyperlipidemia Father     Genetic Disorder Father         Clotting disorder    Cerebrovascular Disease Maternal Grandfather     Cancer - colorectal Maternal Grandfather     Colon Cancer Maternal Grandfather      Social History     Tobacco Use    Smoking status: Never    Smokeless tobacco: Never   Substance Use Topics    Alcohol use: Yes     Comment: minimal        Pre-Procedure Education & Consent  Procedure education was  provided to: Patient  Teaching method: Explanation  Barriers to learning: No Barrier    Patient indicated understanding of pre-procedure instruction and appropriate consent was obtained and documented.    ____________________________________________________________________    Post-Procedure Documentation: GI Esophageal Manometry with 24 Hour Ph    Manometry catheter was placed via right nare to 51 cm and normal saline swallows given per protocol. Manometry catheter was removed at the end of test and the pH cathter was placed via right nares to 34 cm.      Diary and discharge instructions given to patient and patent instructed to return tomorrow for catheter removal.    Notification of pending test results sent to provider for interpretation. Please reference scanned document for final interpretation of results. Patient will follow up with referring provider for test results.    Carlota Santana PA-C on 12/12/2024 at 9:07 AM

## 2024-12-12 NOTE — Clinical Note
12/12/2024      Mercedes Patel  [address]  [facility] [address]      Dear Colleague,    Thank you for referring your patient, Mercedes Patel, to the [facility] [place]. Please see a copy of my visit note below.    Non-Invasive GI Procedure Visit    Mercedes Patel is a 59 year old female with history of    Chronic cough  Belching symptom.   Patient stated reason for procedure:  Cough/Regurgitation      COVID-19 PCR Results          8/14/2023    13:23   COVID-19 PCR Results   SARS CoV2 PCR Negative      COVID-19 Antibody Results, Testing for Immunity           No data to display                Pre-Procedure Assessment  Patient presents to clinic today for Esophageal Manometry Study with pH    Referring Provider: Carlota Rodriguez PA-C  Patient has undergone previous endoscopy.    Does patient report taking a PPI (omeprazole, pantoprazole, rabeprazole, lansoprazole, esomeprazole, dexlansoprazole)? No  Does patient report taking a H2 blocker (ranitidine, or famotidine)? No - Was previously taking OTC Omeprazole 20mg as needed  Does patient report taking opioids? No  Patient reported that last food and/or drink was last consumed 15 hours ago.  Esophageal Questionnaire(s) Completed: Yes -   Esophageal Questionnaire(s)    BEDQ Questionnaire      12/7/2024     9:18 AM   BEDQ Questionnaire: How Often Have You Had the Following?   Trouble eating solid food (meat, bread, vegetables) 0    Trouble eating soft foods (yogurt, jello, pudding) 0    Trouble swallowing liquids 0    Pain while swallowing 0    Coughing or choking while swallowing foods or liquids 2    Total Score: 2        Patient-reported         12/7/2024     9:18 AM   BEDQ Questionnaire: Discomfort/Pain Ratings   Eating solid food (meat, bread, vegetables) 0    Eating soft foods (yogurt, jello, pudding) 0    Drinking liquid 0    Total Score: 0        Patient-reported       Eckardt Questionnaire      12/7/2024     9:18 AM    Eckardt Questionnaire   Dysphagia 0    Regurgitation 1    Retrosternal Pain 0    Weight Loss (kg) 0    Total Score:  1        Patient-reported       Promis 10 Questionnaire      12/7/2024     9:19 AM   PROMIS 10 FLOWSHEET DATA   In general, would you say your health is: 3    In general, would you say your quality of life is: 3    In general, how would you rate your physical health? 3    In general, how would you rate your mental health, including your mood and your ability to think? 3    In general, how would you rate your satisfaction with your social activities and relationships? 3    In general, please rate how well you carry out your usual social activities and roles. (This includes activities at home, at work and in your community, and responsibilities as a parent, child, spouse, employee, friend, etc.) 3    To what extent are you able to carry out your everyday physical activities such as walking, climbing stairs, carrying groceries, or moving a chair? 4    In the past 7 days, how often have you been bothered by emotional problems such as feeling anxious, depressed, or irritable? 3    In the past 7 days, how would you rate your fatigue on average? 2    In the past 7 days, how would you rate your pain on average, where 0 means no pain, and 10 means worst imaginable pain? 5    Mental health question re-calculation - no clinical value 3    Physical health question re-calculation - no clinical value 4    Pain question re-calculation - no clinical value 3    Global Mental Health Score 12    Global Physical Health Score 14    PROMIS TOTAL - SUBSCORES 26        Patient-reported   .    Patient Hx  Patient's history, medications and allergies were reviewed.     Height: Data Unavailable   Weight: 0 lbs 0 oz    Patient Active Problem List    Diagnosis Date Noted    Benign essential hypertension with target blood pressure below 140/90 11/21/2023     Priority: Medium    Mixed hyperlipidemia 12/17/2021     Priority:  Medium    Menopausal syndrome (hot flashes) 12/17/2021     Priority: Medium    Diverticulosis of large intestine 11/23/2020     Priority: Medium    Adult acne 05/24/2018     Priority: Medium    Chronic right-sided low back pain with right-sided sciatica 02/27/2017     Priority: Medium    Leiomyoma of uterus      Priority: Medium     2 small fibroids noted on pelvic us  Problem list name updated by automated process. Provider to review      History of chronic sinusitis      Priority: Medium     chronic, recurrent sinusitis, sinus disease noted on ct, mri  Problem list name updated by automated process. Provider to review      Nontoxic multinodular goiter 04/21/2004     Priority: Medium     followed by Dr. Villalta s/p bx nl        Prior to Admission medications    Medication Sig Start Date End Date Taking? Authorizing Provider   azithromycin (ZITHROMAX) 500 MG tablet Take 250 mg by mouth three times a week. For patient's eye allergies. ( Dry eyes )    Reported, Patient   Cholecalciferol (VITAMIN D) 1000 UNITS capsule Take 2 capsules by mouth daily     Reported, Patient   fexofenadine (ALLEGRA) 180 MG tablet Take 180 mg by mouth daily.    Reported, Patient   lisinopril (ZESTRIL) 10 MG tablet TAKE 1 TABLET (10 MG) BY MOUTH DAILY 10/29/24   Harika Montoya MD   loteprednol (LOTEMAX) 0.5 % ophthalmic suspension  3/6/23   Reported, Patient   magnesium oxide 400 MG CAPS Take 1 capsule by mouth daily.    Reported, Patient   polyethylene glycol (MIRALAX) 17 GM/Dose powder Take 1 Capful by mouth daily.    Reported, Patient   rosuvastatin (CRESTOR) 5 MG tablet Take 1 tablet (5 mg) by mouth daily. 10/10/24   Harika Montoya MD   venlafaxine (EFFEXOR XR) 75 MG 24 hr capsule Take 1 capsule (75 mg) by mouth daily. 10/10/24   Harika Montoya MD     Allergies   Allergen Reactions    Erythromycin Rash    Penicillins Rash    Seasonal Allergies     Sulfa Antibiotics Rash     Past Medical History:   Diagnosis Date     Acne     Allergic rhinitis     Benign essential hypertension     Chronic fatigue     Constipation     Disorders of bursae and tendons in shoulder region, unspecified     Lt shoulder s/p steroid injection 7/02    GERD (gastroesophageal reflux disease) 04/2009    Headache(784.0)     with recurrent sinusitis and allergies,Dr. Traore ENT     Insomnia     Leiomyoma of uterus, unspecified 02/2006    2 small fibroids noted on pelvic us    Menorrhagia 2008    tx'd with ablation    Nontoxic multinodular goiter     followed by Dr. Raymundo.  Biopsy nl    Other acne     derm Dr. Ventura, started spironolactone 3/08    Perennial allergic rhinitis 2006    gets allergy injections     Past Surgical History:   Procedure Laterality Date    BIOPSY      thyroid    COLONOSCOPY  7/1/16    ENDOSCOPY      ESOPHAGOSCOPY, GASTROSCOPY, DUODENOSCOPY (EGD), COMBINED N/A 6/4/2024    Procedure: ESOPHAGOGASTRODUODENOSCOPY, WITH POLYPECTOMY;  Surgeon: Melisa Reynoso MD;  [place] [facility] [place] MAXILLOFACIAL W/O CONTRAST  01/01/2006    chronic sinus disease    HC MRI BRAIN W/O CONTRAST  01/01/2004    MRI brain pos sinusitis    HC TOOTH EXTRACTION W/FORCEP  1984    HC US SOFT TISSUE HEAD/NECK  2/08, 3/10    thyroid 3 nodules unchanged, f/b endocrine, s/p FNA #4 isthmus nodule benign 9/10    HYSTEROSCOPY,ABLATION ENDOMETRIUM  01/01/2008        SONO PELVIS COMPLETE  2/06, 3/08,12/11    fibroids    TONSILLECTOMY      US ABDOMEN LIMITED PORTABLE  01/01/2011    nl     Family History   Problem Relation Age of Onset    Lipids Mother     Hypertension Mother     Hyperlipidemia Mother     Hypertension Father     Lipids Father     Blood Disease Father         clotting d/o with factor V leiden-pt negative    Coronary Artery Disease Father 85        2 vessel, d age 90    Hyperlipidemia Father     Genetic Disorder Father         Clotting disorder    Cerebrovascular Disease Maternal Grandfather     Cancer - colorectal Maternal  Grandfather     Colon Cancer Maternal Grandfather      Social History     Tobacco Use    Smoking status: Never    Smokeless tobacco: Never   Substance Use Topics    Alcohol use: Yes     Comment: minimal        Pre-Procedure Education & Consent  Procedure education was provided to: Patient  Teaching method: Explanation  Barriers to learning: No Barrier    Patient indicated understanding of pre-procedure instruction and appropriate consent was obtained and documented.    ____________________________________________________________________    Post-Procedure Documentation: GI Esophageal Manometry with 24 Hour Ph    Manometry catheter was placed via right nare to 51 cm and normal saline swallows given per protocol. Manometry catheter was removed at the end of test and the pH cathter was placed via right nares to 34 cm.      Diary and discharge instructions given to patient and patent instructed to return tomorrow for catheter removal.    Notification of pending test results sent to provider for interpretation. Please reference scanned document for final interpretation of results. Patient will follow up with referring provider for test results.    Carlota Santana PA-C on 12/12/2024 at 9:07 AM            Again, thank you for allowing me to participate in the care of your patient.        Sincerely,        Non-Invasive GI Nurse

## 2024-12-12 NOTE — PATIENT INSTRUCTIONS
Esophogeal Manometry with pH  1. Resume regular diet.  2. You may have a bloody nose or sore throat after the procedure.  3. You had a 24-hour probe placed, return the next day to have the probe removed.  Removal will take 5 minutes.  4. If you have questions call [phone] from 7:00am-5:00pm.  For afterhours questions call GI doctor on call at [phone].

## 2024-12-17 ENCOUNTER — PRE VISIT (OUTPATIENT)
Dept: OTOLARYNGOLOGY | Facility: CLINIC | Age: 59
End: 2024-12-17

## 2024-12-17 ENCOUNTER — OFFICE VISIT (OUTPATIENT)
Dept: OTOLARYNGOLOGY | Facility: CLINIC | Age: 59
End: 2024-12-17
Payer: COMMERCIAL

## 2024-12-17 ENCOUNTER — THERAPY VISIT (OUTPATIENT)
Dept: SPEECH THERAPY | Facility: CLINIC | Age: 59
End: 2024-12-17
Payer: COMMERCIAL

## 2024-12-17 VITALS
SYSTOLIC BLOOD PRESSURE: 154 MMHG | DIASTOLIC BLOOD PRESSURE: 100 MMHG | HEART RATE: 99 BPM | WEIGHT: 135 LBS | HEIGHT: 63 IN | BODY MASS INDEX: 23.92 KG/M2 | OXYGEN SATURATION: 95 %

## 2024-12-17 DIAGNOSIS — R49.0 DYSPHONIA: ICD-10-CM

## 2024-12-17 DIAGNOSIS — R05.3 CHRONIC COUGH: Primary | ICD-10-CM

## 2024-12-17 DIAGNOSIS — R13.10 DYSPHAGIA, UNSPECIFIED TYPE: ICD-10-CM

## 2024-12-17 DIAGNOSIS — R13.12 DYSPHAGIA, OROPHARYNGEAL PHASE: Primary | ICD-10-CM

## 2024-12-17 DIAGNOSIS — R49.0 DYSPHONIA: Primary | ICD-10-CM

## 2024-12-17 PROCEDURE — 92612 ENDOSCOPY SWALLOW (FEES) VID: CPT | Mod: GN | Performed by: OTOLARYNGOLOGY

## 2024-12-17 PROCEDURE — 31579 LARYNGOSCOPY TELESCOPIC: CPT | Mod: 52 | Performed by: OTOLARYNGOLOGY

## 2024-12-17 PROCEDURE — 99203 OFFICE O/P NEW LOW 30 MIN: CPT | Mod: 25 | Performed by: OTOLARYNGOLOGY

## 2024-12-17 PROCEDURE — 92613 ENDOSCOPY SWALLOW (FEES) I&R: CPT | Performed by: OTOLARYNGOLOGY

## 2024-12-17 NOTE — PROGRESS NOTES
"[facility] VOICE CLINIC  CLINICAL EVALUATION REPORT    Patient: Mercedes Patel  Date of Service: 12/17/2024  Seen in conjunction with: Dr. Gloria Estevez  Referring physician: Dr. Estevez    HISTORY  PATIENT INFORMATION  Mercedes Patel is a 59 year old presenting today for initial evaluation of voice and resonance. Salient details of her symptom history are as follows:    The patient's primary concern today is chronic coughing that began gradually approximately one year ago without obvious inciting event, although she does note that reflux symptoms began around that time. Symptoms have been slightly worsening over time. Cough occurs most frequently following meals, but can also occur at random times, such as watching TV. Talking/voice use, strong smells, and body position do not seem to be triggers. Cold air can sometimes be a trigger, and she has also awoken from sleep coughing. She has some control over cough by \"pressing tongue backward.\"  Cough does sometimes develop into coughing fits that can last for up to 2 minutes. Primary precursor sensations are \"dry\" feeling and \"liquid\" feeling in base of throat (points toward clavicular notch).    She additional reports frequent throat clearing with similar profile as above, but with symptoms beginning 10 years or more ago in the setting of frequent sinus infections.    She denies voice concerns, although she does note that she will occasionally have a sudden sensation of her throat tightening that makes it difficult to talk. This happens once every few weeks and only lasts a few minutes. It is not associated with a change in voice quality.    She denies swallowing and breathing difficulties.    Regarding reflux, she follows with esophagology. She had HRM last week (results pending). She has a known small hiatal hernia, and also previously had difficulties belching (now resolved). She takes omeprazole regularly, but with ongoing breakthrough symptoms.        OBJECTIVE " "FINDINGS  PATIENT REPORTED MEASURES  Patient Supplied Answers To [facility] Intake Voice Questionnaire       No data to display                 Patient Supplied Answers To VHI Questionnaire       No data to display                 Patient Supplied Answers To CSI Questionnaire       No data to display                 Patient Supplied Answers To EAT Questionnaire       No data to display                  Self-Ratings as discussed with patient:       No data to display                 PERCEPTUAL EVALUATION (56493)  VOICE/ SPEECH/ NON-COMMUNICATIVE LARYNGEAL BEHAVIORS EVALUATION  Palpation of the laryngeal area shows:  supple musculature  massage of the thyrohyoid area is not painful and does not trigger cough  Breathing pattern:   inspirations are inadequate in volume and frequency  Cough/ Throat clear:  throat clear is primary and non productive   Mercedes states today is a typical voice day, with clinician observing voice quality characterized by:  Roughness: WNL  Breathiness: WNL  Strain: Mild Consistent  Habitual pitch is perceptually WNL  Loudness is WNL and is appropriate for the setting    GRADE, ROUGHNESS, BREATHINESS, ASTHENIA, STRAIN  (GRBAS) scale:  G(0)R(0)B(0)A(0)S(1)    LARYNGEAL EXAMINATION  Procedure: Flexible endoscopy with chip-tip technology with stroboscopy, left nostril; topical anesthesia with 3% Lidocaine and 0.25% phenylephrine was applied.   Performed by: Dr. Gloria Estevez  Verbal consent was obtained and witnessed prior to this procedure.   A time-out was performed, verifying patient, procedure, and site.   This exam shows:  Velar Function: not assessed  Secretions:  WNL  Laryngeal Mucosa: posterior commissure hypertrophy and subtle post cricoid edema  Vocal fold mucosa:    RTVF - within normal limits, no visible lesions  LTVF - within normal limits, no visible lesions  Vocal fold function:   Vocal folds are mobile and meet at midline  Movement is brisk and symmetric  Frequent \"twitching,\" consistent " "with laryngeal hypersensitivity   Narrow Band Imaging (NBI) demonstrated: findings consistent with straight light  Airway is patent  Elongation of the vocal folds for pitch increase is normal  Mild-moderate ventricular approximation during phonation; ventricular folds appear to be a vibratory source damp vibration of the true vocal folds  Therapy probes show reduction in supraglottic constriction with flow-mode phonation  The addition of stroboscopy provided the following information:  Vibratory Behavior: present bilaterally  Amplitude Right: WNL  Amplitude Left: WNL  Mucosal Wave Right: WNL  Mucosal Wave Left: WNL  Glottic closure:  complete with subtle open phase predominance   Symmetry:  mostly symmetric  Periodicity: mostly periodic     STIMULABILITY: results of therapy probes during perceptual and laryngeal evaluation demonstrate improvement with coordination of respiration and phonation    ASSESSMENT / PLAN  IMPRESSIONS: Mercedes Patel is a 59 year old with Chronic Cough (R05.3) and Dysphonia characterized by strain.  Laryngeal exam demonstrates grossly normal structure and function with the exception of supraglottic compression on connected speech and \"twitching\" consistent with laryngeal hypersensitivity, as well as subtle interarytenoid erythema and post cricoid edema consistent with reflux history. At this time, it appears that cough is multi-factorial and involves both GI and behavioral etiologies. As such, a course of behavioral cough suppression therapy is indicated.  A course of speech therapy is recommended to optimize vocal technique, improve voice quality, promote reduced discomfort, effort and fatigue, and help reduce chronic cough and throat clear.  She demonstrates a Good prognosis for improvement given adherence to therapeutic recommendations.   Positive indicators: positive response to therapy probes diagnosis is known to respond to treatment high level of comittment  Negative indicators: " None  DURATION / FREQUENCY: 4 bi-weekly therapy sessions    Goals:  Patient goal:    To understand the problem and fix it as much as possible     Short-term goal(s): Within the first 4 sessions, Mercedes will:  -- demonstrate improved awareness of throat clearing/cough: acknowledging >75% of all cough events during session time with no clinician support.  -- demonstrate provided cough and throat clear suppression/substitution strategies from memory independently with 90% accuracy.  -- coordinate appropriate air flow levels with forward resonance during phonation in order to minimize laryngeal compensation and effort with 90% accy.    Long-term goal(s): In 3 months, Mercedes will:  -- report a 90% resolution of cough, throat clearing, and voice symptoms during a week of performing typical personal, social, and professional activities.    This treatment plan was developed with the patient who agreed with the recommendations.    TOTAL SERVICE TIME: 60 minutes  EVALUATION OF VOICE AND RESONANCE (58900)  NO CHARGE FACILITY FEE (17051)    Speech recognition software may have been used in this documentation; input is reviewed before signature to the best of my ability.     Andrea Blanco, Ph.D., [facility]  Speech-Language Pathologist-[facility]  [phone]  he/him/his

## 2024-12-17 NOTE — LETTER
2024       RE: Mercedes Patel  [address]  [facility] [address]     Dear Colleague,    Thank you for referring your patient, Mercedes Ptael, to the [facility] [place] at [facility]. Please see a copy of my visit note below.        [facility]   at the [facility]   Otolaryngology Clinic     Patient: Mercedes Patel    MRN: [id number]    : 1965    Age/Gender: 59 year old female  Date of Service: 2024  Rendering Provider:   Gloria Estevez MD       Impression & Plan     IMPRESSION: Ms. Patel is a 59 year old female who is being seen for the following:    Chronic cough/throat clearing   - referred here for temporary inability to burp, she can now burp -she does not have retrograde cricopharyngeal dysfunction  - started years ago with throat clearing but coughing worse in the past 6 months   - has worsened overtime  - associated with sensation in the throat, sometimes liquid sensation  - denies cigarettes   - yes ACEI   - allergy triggers and work up: reports   - pulmonary triggers and work up: denies  - GI triggers and work up: reports - working with GI had EGD 2024 - normal, then had manometry - pending results  - ENT triggers and work up: with cold, with talking ( high voice demand as care coordinator)  - scope shows postcricoid edema  - FEES shows no penetration or aspiration, had to throat clear at the end of the exam though no residue seen  - symptoms likely due to laryngopharyngeal reflux and irritable larynx syndrome   - discussed treatment is cough/throat clearing suppression therapy and optimizing GI symptoms  Plan  - cough/throat clearing suppression therapy   - continue work with GI   - will discuss at swallow rounds  - needs to come off lisinopril and obtain a CXR       RETURN VISIT: as needed after therapy    Referring Provider   PCP: Harika Montoya  Cyndie  Referring Physician: Referred MD Steve  No address on file  Reason for Consultation   Chronic cough  History   HISTORY OF PRESENT ILLNESS: Ms. Patel is a 59 year old female who presents to us today with chronic cough.         she presents today for evaluation. she reports:    Dysphonia:  reports       Dysphagia:  reports       Dyspnea:  denies       Coughing / Throat-clearing:  reports       GERD/LPRD:  reports    PAST MEDICAL HISTORY:   Past Medical History:   Diagnosis Date     Acne      Allergic rhinitis      Benign essential hypertension      Chronic fatigue      Constipation      Disorders of bursae and tendons in shoulder region, unspecified     Lt shoulder s/p steroid injection 7/02     GERD (gastroesophageal reflux disease) 04/2009     Headache(784.0)     with recurrent sinusitis and allergies,Dr. Traore ENT      Insomnia      Leiomyoma of uterus, unspecified 02/2006    2 small fibroids noted on pelvic us     Menorrhagia 2008    tx'd with ablation     Nontoxic multinodular goiter     followed by Dr. Raymundo.  Biopsy nl     Other acne     derm Dr. Ventura, started spironolactone 3/08     Perennial allergic rhinitis 2006    gets allergy injections       PAST SURGICAL HISTORY:   Past Surgical History:   Procedure Laterality Date     BIOPSY      thyroid     COLONOSCOPY  7/1/16     ENDOSCOPY       ESOPHAGOSCOPY, GASTROSCOPY, DUODENOSCOPY (EGD), COMBINED N/A 6/4/2024    Procedure: ESOPHAGOGASTRODUODENOSCOPY, WITH POLYPECTOMY;  Surgeon: Melisa Reynoso MD;  [place] [facility] [place] CT MAXILLOFACIAL W/O CONTRAST  01/01/2006    chronic sinus disease     HC MRI BRAIN W/O CONTRAST  01/01/2004    MRI brain pos sinusitis     HC TOOTH EXTRACTION W/FORCEP  1984     HC US SOFT TISSUE HEAD/NECK  2/08, 3/10    thyroid 3 nodules unchanged, f/b endocrine, s/p FNA #4 isthmus nodule benign 9/10     HYSTEROSCOPY,ABLATION ENDOMETRIUM  01/01/2008         SONO PELVIS COMPLETE  2/06, 3/08,12/11    fibroids      TONSILLECTOMY       US ABDOMEN LIMITED PORTABLE  01/01/2011    nl       CURRENT MEDICATIONS:   Current Outpatient Medications:      Cholecalciferol (VITAMIN D) 1000 UNITS capsule, Take 2 capsules by mouth daily , Disp: , Rfl:      fexofenadine (ALLEGRA) 180 MG tablet, Take 180 mg by mouth daily., Disp: , Rfl:      lisinopril (ZESTRIL) 10 MG tablet, TAKE 1 TABLET (10 MG) BY MOUTH DAILY, Disp: 90 tablet, Rfl: 2     loteprednol (LOTEMAX) 0.5 % ophthalmic suspension, , Disp: , Rfl:      polyethylene glycol (MIRALAX) 17 GM/Dose powder, Take 1 Capful by mouth daily., Disp: , Rfl:      rosuvastatin (CRESTOR) 5 MG tablet, Take 1 tablet (5 mg) by mouth daily., Disp: 90 tablet, Rfl: 3     venlafaxine (EFFEXOR XR) 75 MG 24 hr capsule, Take 1 capsule (75 mg) by mouth daily., Disp: 90 capsule, Rfl: 3     azithromycin (ZITHROMAX) 500 MG tablet, Take 250 mg by mouth three times a week. For patient's eye allergies. ( Dry eyes ), Disp: , Rfl:      magnesium oxide 400 MG CAPS, Take 1 capsule by mouth daily., Disp: , Rfl:     ALLERGIES: Erythromycin, Penicillins, Seasonal allergies, and Sulfa antibiotics    SOCIAL HISTORY:    Social History     Socioeconomic History     Marital status:      Spouse name: Chino     Number of children: 3     Years of education: Not on file     Highest education level: Not on file   Occupational History     Occupation: RN     Employer: [employer]     Comment: 6th floor    Tobacco Use     Smoking status: Never     Smokeless tobacco: Never   Vaping Use     Vaping status: Never Used   Substance and Sexual Activity     Alcohol use: Yes     Comment: minimal     Drug use: No     Sexual activity: Yes     Partners: Male     Birth control/protection: Male Surgical, Post-menopausal   Other Topics Concern      Service No     Blood Transfusions No     Caffeine Concern No     Comment: 2-3 cups/day     Occupational Exposure Yes     Comment: nurse     Hobby Hazards No      Sleep Concern No     Stress Concern No     Weight Concern Yes     Comment: wants to loose weight     Special Diet No     Back Care No     Exercise Yes     Comment: was untill got ill a month ago.     Bike Helmet Not Asked     Comment: N/A     Seat Belt Yes     Self-Exams Yes     Parent/sibling w/ CABG, MI or angioplasty before 65F 55M? No   Social History Narrative    Son getting  in [facility] in 2/11, Dtr Alejandra     Social Drivers of Health     Financial Resource Strain: Low Risk  (9/30/2024)    Financial Resource Strain      Within the past 12 months, have you or your family members you live with been unable to get utilities (heat, electricity) when it was really needed?: No   Food Insecurity: Low Risk  (9/30/2024)    Food Insecurity      Within the past 12 months, did you worry that your food would run out before you got money to buy more?: No      Within the past 12 months, did the food you bought just not last and you didn t have money to get more?: No   Transportation Needs: Low Risk  (9/30/2024)    Transportation Needs      Within the past 12 months, has lack of transportation kept you from medical appointments, getting your medicines, non-medical meetings or appointments, work, or from getting things that you need?: No   Physical Activity: Sufficiently Active (9/30/2024)    Exercise Vital Sign      Days of Exercise per Week: 6 days      Minutes of Exercise per Session: 40 min   Stress: No Stress Concern Present (9/30/2024)    [employer] - Occupational Stress Questionnaire      Feeling of Stress : Only a little   Social Connections: Unknown (9/30/2024)    Social Connection and Isolation Panel [NHANES]      Frequency of Communication with Friends and Family: Not on file      Frequency of Social Gatherings with Friends and Family: Once a week      Attends [employer]ism Services: Not on file      Active Member of Clubs or Organizations: Not on file      Attends Club or  Organization Meetings: Not on file      Marital Status: Not on file   Interpersonal Safety: Low Risk  (12/17/2024)    Interpersonal Safety      Do you feel physically and emotionally safe where you currently live?: Yes      Within the past 12 months, have you been hit, slapped, kicked or otherwise physically hurt by someone?: No      Within the past 12 months, have you been humiliated or emotionally abused in other ways by your partner or ex-partner?: No   Housing Stability: Low Risk  (9/30/2024)    Housing Stability      Do you have housing? : Yes      Are you worried about losing your housing?: No         FAMILY HISTORY:   Family History   Problem Relation Age of Onset     Lipids Mother      Hypertension Mother      Hyperlipidemia Mother      Hypertension Father      Lipids Father      Blood Disease Father         clotting d/o with factor V leiden-pt negative     Coronary Artery Disease Father 85        2 vessel, d age 90     Hyperlipidemia Father      Genetic Disorder Father         Clotting disorder     Cerebrovascular Disease Maternal Grandfather      Cancer - colorectal Maternal Grandfather      Colon Cancer Maternal Grandfather      Non-contributory for problems with anesthesia    REVIEW OF SYSTEMS:   The patient was asked a 14 point review of systems regarding constitutional symptoms, eye symptoms, ears, nose, mouth, throat symptoms, cardiovascular symptoms, respiratory symptoms, gastrointestinal symptoms, genitourinary symptoms, musculoskeletal symptoms, integumentary symptoms, neurological symptoms, psychiatric symptoms, endocrine symptoms, hematologic/lymphatic symptoms, and allergic/ immunologic symptoms.   The pertinent factors have been included in the HPI and below.  Patient Supplied Answers to Review of Systems      12/12/2024    11:13 AM   [facility] ENT ROS   Gastrointestinal/Genitourinary Constipation   Musculoskeletal Back pain       Physical Examination   The patient underwent a physical examination as  described below. The pertinent positive and negative findings are summarized after the description of the examination.  Constitutional: The patient's developmental and nutritional status was assessed. The patient's voice quality was assessed.  Head and Face: The head and face were inspected for deformities. The sinuses were palpated. The salivary glands were palpated. Facial muscle strength was assessed bilaterally.  Eyes: Extraocular movements and primary gaze alignment were assessed.  Ears, Nose, Mouth and Throat: The ears and nose were examined for deformities. The nasal septum, mucosa, and turbinates were inspected by anterior rhinoscopy. The lips, teeth, and gums were examined for abnormalities. The oral mucosa, tongue, palate, tonsils, lateral and posterior pharynx were inspected for the presence of asymmetry or mucosal lesions.    Neck: The tracheal position was noted, and the neck mass palpated to determine if there were any asymmetries, abnormal neck masses, thyromegally, or thyroid nodules.  Respiratory: The nature of the breathing and chest expansion/symmetry was observed.  Cardiovascular: The patient was examined to determine the presence of any edema or jugular venous distension.  Abdomen: The contour of the abdomen was noted.  Lymphatic: The patient was examined for infraclavicular lymphadenopathy.  Musculoskeletal: The patient was inspected for the presence of skeletal deformities.  Extremities: The extremities were examined for any clubbing or cyanosis.  Skin: The skin was examined for inflammatory or neoplastic conditions.  Neurologic: The patient's orientation, mood, and affect were noted. The cranial nerve  functions were examined.  Other pertinent positive and negative findings on physical examination:      OC/OP: no lesions, uvula midline, soft palate elevates symmetrically   Neck: no lesions,   TH tenderness to palpation, need to cough with pressue     All other physical examination findings  were within normal limits and noncontributory.  Procedures   Video Laryngoscopy with Stroboscopy (CPT 69734)    Preoperative Diagnosis:  Dysphonia and throat symptoms  Postoperative Diagnosis: Dysphonia and throat symptoms  Indication:  Patient has new or persistent dysphonia and throat symptoms.  This requires evaluation by stroboscopy to fully delineate the laryngeal functioning; especially mucosal wave assessment, ultrasharp visualization of lesions on the vocal folds, and overall functioning of the larynx.  Details of Procedure: A 70 degree rigid telescopic laryngoscope or a distal chip flexible scope was used. The lighting was obtained via a light cable connected to a stroboscopic unit. The telescope was inserted either transorally or transnasally until the vocal folds could be visualized. The patient was instructed to sustain the vowel  ee  at a comfortable pitch and loudness as the voice was monitored by a microphone connected to a fundamental frequency tracker. This circuit tracked vocal periodicity, allowing the light to flash in synchrony with the glottal cycles. A setting on the stroboscope was set to change the phase of light strobing with relation to the vocal fundamental frequency, producing an image of 1 to 2 glottal cycles every second. The video images were recorded for analysis. Use of the variable speed, slow and stop scan allowed careful study of pertinent segments of laryngeal function to increase accuracy of clinical assessments of function and dysfunction.  In particular, features of glottal closure, mucosal wave on the vocal fold cover and laryngeal symmetry were analyzed. Lastly, the patient was asked to phonate speech samples and auditory/perceptual evaluation of voice and resonance were performed.  The vocal quality was carefully evaluated for hoarseness, breathiness, loudness, phrase length and intelligibility to determine the source of dysphonia.    Findings:      ELIUD  LARYNGOVIDEOSTROBOSCOPY   Anatomic/Physiological Deviations:  Postcricoid edema   Mucosal wave: Right:  No restriction     Left: No restriction  Bilateral Vocal Fold Vibration: Symmetric  Vocal Process: Right: No restriction    Left:  No restriction  Vocal Fold closure: Complete glottal closure    Complication(s): None  Disposition: Patient tolerated the procedure well          Fiberoptic Endoscopic Evaluation of Swallowing (CPT 99784)  and Interpretation of Swallowing (CPT 08146)    Indications: See above notes for complete history and physical. Patient complains of dysphagia to both solids and liquids and/or there is suggestion on history and endoscopic exam of the presence of dysphagia causing medical complaints.  Swallowing evaluation is being performed to assess the presence and degree of dysphagia, and to recommend a safe diet.     Pulmonary Status:  No PNA   Current Diet:              regular                                        thin liquids      Consistency Amounts:  Thin Liquid: sip   Puree: sip  Solid: cookies            Positioning: upright in a chair  Oral Peripheral Exam: See physical exam section.  Anatomic Notes: See Videostroboscopy report for assessment of anatomy and laryngeal functioning  Pharyngeal secretions prior to administration of liquid or food: No    Oral Phase Abnormal Findings: No abnormal behavior observed    Pharyngeal Phase Abnormal Findings: no penetration, no aspiration, needed to throat clear despite lack of residue    Recommended Diet:  regular                                        thin liquids                Review of Relevant Clinical Data   I personally reviewed:  Notes: PAT Rodriguez 8/21/24  Radiology:    Pathology:    Procedures:    Labs:  Lab Results   Component Value Date    TSH 1.33 10/04/2024     Lab Results   Component Value Date     10/04/2024    CO2 23 10/04/2024    BUN 14.4 10/04/2024     Lab Results   Component Value Date    WBC 4.1 12/17/2021    HGB 15.0  "12/17/2021    HCT 46.0 12/17/2021    MCV 93 12/17/2021     12/17/2021     No results found for: \"PT\", \"PTT\", \"INR\"  No results found for: \"SOPHIA\"  No components found for: \"RHEUMATOIDFACTOR\", \"RF\"  No results found for: \"CRP\"  No components found for: \"CKTOT\", \"URICACID\"  No components found for: \"C3\", \"C4\", \"DSDNAAB\", \"NDNAABIFA\"  No results found for: \"MPOAB\"    Patient reported Quality of Life (QOL) Measures   Patient Supplied Answers To VHI Questionnaire       No data to display                  Patient Supplied Answers To EAT Questionnaire       No data to display                  Patient Supplied Answers To CSI Questionnaire       No data to display                  Patient Supplied Answers to Dyspnea Index Questionnaire:       No data to display                    Thank you for the kind referral and for allowing me to share in the care of Ms. Patel. If you have any questions, please do not hesitate to contact me.    Gloria Estevez MD    Laryngology    [facility]  Department of  Otolaryngology - Head and Neck Surgery  Clinics & Surgery Center  [address] [place] [address]  Appointment line: [phone]  Fax: [phone]  [email or web address]         Again, thank you for allowing me to participate in the care of your patient.      Sincerely,    Gloria Estevez MD    "

## 2024-12-17 NOTE — PATIENT INSTRUCTIONS
1.  You were seen in the ENT Clinic today by Dr. Estevez. If you have any questions or concerns after your appointment, please call [phone]. Press option #1 for scheduling related needs. Press option #3 for Nurse advice.    2.  Dr. Estevez has recommended the following:   - voice therapy    3.  Plan is to return to clinic as needed after therapy    How to Contact Us:  Send a [facility] message to your provider. Our team will respond to you via [facility] Me. Occasionally, we will need to call you to get further information.  For urgent matters (Monday-Friday, 8:00 AM-3:30 PM), call the ENT Clinic: [phone] and speak with a call center team member - they will route your call appropriately.   If you'd like to speak directly with a nurse, please call [phone]. We do our best to check voicemail frequently throughout the day, and will work to call you back within 1-2 days. For urgent matters, please use the general clinic phone numbers listed above.      Simi Ramos  [phone]  [facility] Otolaryngology

## 2024-12-17 NOTE — PROGRESS NOTES
[facility]   at the [facility]   Otolaryngology Clinic     Patient: Mercedes Patel    MRN: [id number]    : 1965    Age/Gender: 59 year old female  Date of Service: 2024  Rendering Provider:   Gloria Estevez MD       Impression & Plan     IMPRESSION: Ms. Patel is a 59 year old female who is being seen for the following:    Chronic cough/throat clearing   - referred here for temporary inability to burp, she can now burp -she does not have retrograde cricopharyngeal dysfunction  - started years ago with throat clearing but coughing worse in the past 6 months   - has worsened overtime  - associated with sensation in the throat, sometimes liquid sensation  - denies cigarettes   - yes ACEI   - allergy triggers and work up: reports   - pulmonary triggers and work up: denies  - GI triggers and work up: reports - working with GI had EGD 2024 - normal, then had manometry - pending results  - ENT triggers and work up: with cold, with talking ( high voice demand as care coordinator)  - scope shows postcricoid edema  - FEES shows no penetration or aspiration, had to throat clear at the end of the exam though no residue seen  - symptoms likely due to laryngopharyngeal reflux and irritable larynx syndrome   - discussed treatment is cough/throat clearing suppression therapy and optimizing GI symptoms  Plan  - cough/throat clearing suppression therapy   - continue work with GI   - will discuss at swallow rounds  - needs to come off lisinopril and obtain a CXR       RETURN VISIT: as needed after therapy    Referring Provider   PCP: Harika Montoya  Referring Physician: Referred Self, MD  No address on file  Reason for Consultation   Chronic cough  History   HISTORY OF PRESENT ILLNESS: Ms. Patel is a 59 year old female who presents to us today with chronic cough.         she presents today for evaluation. she reports:    Dysphonia:  reports       Dysphagia:  reports        Dyspnea:  denies       Coughing / Throat-clearing:  reports       GERD/LPRD:  reports    PAST MEDICAL HISTORY:   Past Medical History:   Diagnosis Date    Acne     Allergic rhinitis     Benign essential hypertension     Chronic fatigue     Constipation     Disorders of bursae and tendons in shoulder region, unspecified     Lt shoulder s/p steroid injection 7/02    GERD (gastroesophageal reflux disease) 04/2009    Headache(784.0)     with recurrent sinusitis and allergies,Dr. Traore ENT     Insomnia     Leiomyoma of uterus, unspecified 02/2006    2 small fibroids noted on pelvic us    Menorrhagia 2008    tx'd with ablation    Nontoxic multinodular goiter     followed by Dr. Raymundo.  Biopsy nl    Other acne     derm Dr. Ventura, started spironolactone 3/08    Perennial allergic rhinitis 2006    gets allergy injections       PAST SURGICAL HISTORY:   Past Surgical History:   Procedure Laterality Date    BIOPSY      thyroid    COLONOSCOPY  7/1/16    ENDOSCOPY      ESOPHAGOSCOPY, GASTROSCOPY, DUODENOSCOPY (EGD), COMBINED N/A 6/4/2024    Procedure: ESOPHAGOGASTRODUODENOSCOPY, WITH POLYPECTOMY;  Surgeon: Melisa Reynoso MD;  Location: [facility] OR    HC CT MAXILLOFACIAL W/O CONTRAST  01/01/2006    chronic sinus disease    HC MRI BRAIN W/O CONTRAST  01/01/2004    MRI brain pos sinusitis    HC TOOTH EXTRACTION W/FORCEP  1984    HC US SOFT TISSUE HEAD/NECK  2/08, 3/10    thyroid 3 nodules unchanged, f/b endocrine, s/p FNA #4 isthmus nodule benign 9/10    HYSTEROSCOPY,ABLATION ENDOMETRIUM  01/01/2008        SONO PELVIS COMPLETE  2/06, 3/08,12/11    fibroids    TONSILLECTOMY      US ABDOMEN LIMITED PORTABLE  01/01/2011    nl       CURRENT MEDICATIONS:   Current Outpatient Medications:     Cholecalciferol (VITAMIN D) 1000 UNITS capsule, Take 2 capsules by mouth daily , Disp: , Rfl:     fexofenadine (ALLEGRA) 180 MG tablet, Take 180 mg by mouth daily., Disp: , Rfl:     lisinopril (ZESTRIL) 10 MG tablet, TAKE 1  TABLET (10 MG) BY MOUTH DAILY, Disp: 90 tablet, Rfl: 2    loteprednol (LOTEMAX) 0.5 % ophthalmic suspension, , Disp: , Rfl:     polyethylene glycol (MIRALAX) 17 GM/Dose powder, Take 1 Capful by mouth daily., Disp: , Rfl:     rosuvastatin (CRESTOR) 5 MG tablet, Take 1 tablet (5 mg) by mouth daily., Disp: 90 tablet, Rfl: 3    venlafaxine (EFFEXOR XR) 75 MG 24 hr capsule, Take 1 capsule (75 mg) by mouth daily., Disp: 90 capsule, Rfl: 3    azithromycin (ZITHROMAX) 500 MG tablet, Take 250 mg by mouth three times a week. For patient's eye allergies. ( Dry eyes ), Disp: , Rfl:     magnesium oxide 400 MG CAPS, Take 1 capsule by mouth daily., Disp: , Rfl:     ALLERGIES: Erythromycin, Penicillins, Seasonal allergies, and Sulfa antibiotics    SOCIAL HISTORY:    Social History     Socioeconomic History    Marital status:      Spouse name: [place]    Number of children: 3    Years of education: Not on file    Highest education level: Not on file   Occupational History    Occupation: RN     Employer: [employer],[address]     Comment: 6th floor    Tobacco Use    Smoking status: Never    Smokeless tobacco: Never   Vaping Use    Vaping status: Never Used   Substance and Sexual Activity    Alcohol use: Yes     Comment: minimal    Drug use: No    Sexual activity: Yes     Partners: Male     Birth control/protection: Male Surgical, Post-menopausal   Other Topics Concern     Service No    Blood Transfusions No    Caffeine Concern No     Comment: 2-3 cups/day    Occupational Exposure Yes     Comment: nurse    Hobby Hazards No    Sleep Concern No    Stress Concern No    Weight Concern Yes     Comment: wants to loose weight    Special Diet No    Back Care No    Exercise Yes     Comment: was untill got ill a month ago.    Bike Helmet Not Asked     Comment: N/A    Seat Belt Yes    Self-Exams Yes    Parent/sibling w/ CABG, MI or angioplasty before 65F 55M? No   Social History Narrative    Son getting   in [place] in 2/11, Dtr Alejandra     Social Drivers of Health     Financial Resource Strain: Low Risk  (9/30/2024)    Financial Resource Strain     Within the past 12 months, have you or your family members you live with been unable to get utilities (heat, electricity) when it was really needed?: No   Food Insecurity: Low Risk  (9/30/2024)    Food Insecurity     Within the past 12 months, did you worry that your food would run out before you got money to buy more?: No     Within the past 12 months, did the food you bought just not last and you didn t have money to get more?: No   Transportation Needs: Low Risk  (9/30/2024)    Transportation Needs     Within the past 12 months, has lack of transportation kept you from medical appointments, getting your medicines, non-medical meetings or appointments, work, or from getting things that you need?: No   Physical Activity: Sufficiently Active (9/30/2024)    Exercise Vital Sign     Days of Exercise per Week: 6 days     Minutes of Exercise per Session: 40 min   Stress: No Stress Concern Present (9/30/2024)    [employer] - Occupational Stress Questionnaire     Feeling of Stress : Only a little   Social Connections: Unknown (9/30/2024)    Social Connection and Isolation Panel [NHANES]     Frequency of Communication with Friends and Family: Not on file     Frequency of Social Gatherings with Friends and Family: Once a week     Attends Judaism Services: Not on file     Active Member of Clubs or Organizations: Not on file     Attends Club or Organization Meetings: Not on file     Marital Status: Not on file   Interpersonal Safety: Low Risk  (12/17/2024)    Interpersonal Safety     Do you feel physically and emotionally safe where you currently live?: Yes     Within the past 12 months, have you been hit, slapped, kicked or otherwise physically hurt by someone?: No     Within the past 12 months, have you been humiliated or emotionally abused in other  ways by your partner or ex-partner?: No   Housing Stability: Low Risk  (9/30/2024)    Housing Stability     Do you have housing? : Yes     Are you worried about losing your housing?: No         FAMILY HISTORY:   Family History   Problem Relation Age of Onset    Lipids Mother     Hypertension Mother     Hyperlipidemia Mother     Hypertension Father     Lipids Father     Blood Disease Father         clotting d/o with factor V leiden-pt negative    Coronary Artery Disease Father 85        2 vessel, d age 90    Hyperlipidemia Father     Genetic Disorder Father         Clotting disorder    Cerebrovascular Disease Maternal Grandfather     Cancer - colorectal Maternal Grandfather     Colon Cancer Maternal Grandfather      Non-contributory for problems with anesthesia    REVIEW OF SYSTEMS:   The patient was asked a 14 point review of systems regarding constitutional symptoms, eye symptoms, ears, nose, mouth, throat symptoms, cardiovascular symptoms, respiratory symptoms, gastrointestinal symptoms, genitourinary symptoms, musculoskeletal symptoms, integumentary symptoms, neurological symptoms, psychiatric symptoms, endocrine symptoms, hematologic/lymphatic symptoms, and allergic/ immunologic symptoms.   The pertinent factors have been included in the HPI and below.  Patient Supplied Answers to Review of Systems      12/12/2024    11:13 AM    ENT ROS   Gastrointestinal/Genitourinary Constipation   Musculoskeletal Back pain       Physical Examination   The patient underwent a physical examination as described below. The pertinent positive and negative findings are summarized after the description of the examination.  Constitutional: The patient's developmental and nutritional status was assessed. The patient's voice quality was assessed.  Head and Face: The head and face were inspected for deformities. The sinuses were palpated. The salivary glands were palpated. Facial muscle strength was assessed bilaterally.  Eyes:  Extraocular movements and primary gaze alignment were assessed.  Ears, Nose, Mouth and Throat: The ears and nose were examined for deformities. The nasal septum, mucosa, and turbinates were inspected by anterior rhinoscopy. The lips, teeth, and gums were examined for abnormalities. The oral mucosa, tongue, palate, tonsils, lateral and posterior pharynx were inspected for the presence of asymmetry or mucosal lesions.    Neck: The tracheal position was noted, and the neck mass palpated to determine if there were any asymmetries, abnormal neck masses, thyromegally, or thyroid nodules.  Respiratory: The nature of the breathing and chest expansion/symmetry was observed.  Cardiovascular: The patient was examined to determine the presence of any edema or jugular venous distension.  Abdomen: The contour of the abdomen was noted.  Lymphatic: The patient was examined for infraclavicular lymphadenopathy.  Musculoskeletal: The patient was inspected for the presence of skeletal deformities.  Extremities: The extremities were examined for any clubbing or cyanosis.  Skin: The skin was examined for inflammatory or neoplastic conditions.  Neurologic: The patient's orientation, mood, and affect were noted. The cranial nerve  functions were examined.  Other pertinent positive and negative findings on physical examination:      OC/OP: no lesions, uvula midline, soft palate elevates symmetrically   Neck: no lesions,   TH tenderness to palpation, need to cough with pressue     All other physical examination findings were within normal limits and noncontributory.  Procedures   Video Laryngoscopy with Stroboscopy (CPT 00954)    Preoperative Diagnosis:  Dysphonia and throat symptoms  Postoperative Diagnosis: Dysphonia and throat symptoms  Indication:  Patient has new or persistent dysphonia and throat symptoms.  This requires evaluation by stroboscopy to fully delineate the laryngeal functioning; especially mucosal wave assessment,  ultrasharp visualization of lesions on the vocal folds, and overall functioning of the larynx.  Details of Procedure: A 70 degree rigid telescopic laryngoscope or a distal chip flexible scope was used. The lighting was obtained via a light cable connected to a stroboscopic unit. The telescope was inserted either transorally or transnasally until the vocal folds could be visualized. The patient was instructed to sustain the vowel  ee  at a comfortable pitch and loudness as the voice was monitored by a microphone connected to a fundamental frequency tracker. This circuit tracked vocal periodicity, allowing the light to flash in synchrony with the glottal cycles. A setting on the stroboscope was set to change the phase of light strobing with relation to the vocal fundamental frequency, producing an image of 1 to 2 glottal cycles every second. The video images were recorded for analysis. Use of the variable speed, slow and stop scan allowed careful study of pertinent segments of laryngeal function to increase accuracy of clinical assessments of function and dysfunction.  In particular, features of glottal closure, mucosal wave on the vocal fold cover and laryngeal symmetry were analyzed. Lastly, the patient was asked to phonate speech samples and auditory/perceptual evaluation of voice and resonance were performed.  The vocal quality was carefully evaluated for hoarseness, breathiness, loudness, phrase length and intelligibility to determine the source of dysphonia.    Findings:      B. LARYNGOVIDEOSTROBOSCOPY   Anatomic/Physiological Deviations:  Postcricoid edema   Mucosal wave: Right:  No restriction     Left: No restriction  Bilateral Vocal Fold Vibration: Symmetric  Vocal Process: Right: No restriction    Left:  No restriction  Vocal Fold closure: Complete glottal closure    Complication(s): None  Disposition: Patient tolerated the procedure well          Fiberoptic Endoscopic Evaluation of Swallowing (CPT  "97317)  and Interpretation of Swallowing (CPT 73497)    Indications: See above notes for complete history and physical. Patient complains of dysphagia to both solids and liquids and/or there is suggestion on history and endoscopic exam of the presence of dysphagia causing medical complaints.  Swallowing evaluation is being performed to assess the presence and degree of dysphagia, and to recommend a safe diet.     Pulmonary Status:  No PNA   Current Diet:              regular                                        thin liquids      Consistency Amounts:  Thin Liquid: sip   Puree: sip  Solid: cookies            Positioning: upright in a chair  Oral Peripheral Exam: See physical exam section.  Anatomic Notes: See Videostroboscopy report for assessment of anatomy and laryngeal functioning  Pharyngeal secretions prior to administration of liquid or food: No    Oral Phase Abnormal Findings: No abnormal behavior observed    Pharyngeal Phase Abnormal Findings: no penetration, no aspiration, needed to throat clear despite lack of residue    Recommended Diet:  regular                                        thin liquids                Review of Relevant Clinical Data   I personally reviewed:  Notes: PAT Rodriguez 8/21/24  Radiology:    Pathology:    Procedures:    Labs:  Lab Results   Component Value Date    TSH 1.33 10/04/2024     Lab Results   Component Value Date     10/04/2024    CO2 23 10/04/2024    BUN 14.4 10/04/2024     Lab Results   Component Value Date    WBC 4.1 12/17/2021    HGB 15.0 12/17/2021    HCT 46.0 12/17/2021    MCV 93 12/17/2021     12/17/2021     No results found for: \"PT\", \"PTT\", \"INR\"  No results found for: \"SOPHIA\"  No components found for: \"RHEUMATOIDFACTOR\", \"RF\"  No results found for: \"CRP\"  No components found for: \"CKTOT\", \"URICACID\"  No components found for: \"C3\", \"C4\", \"DSDNAAB\", \"NDNAABIFA\"  No results found for: \"MPOAB\"    Patient reported Quality of Life (QOL) Measures   Patient Supplied " Answers To VHI Questionnaire       No data to display                  Patient Supplied Answers To EAT Questionnaire       No data to display                  Patient Supplied Answers To CSI Questionnaire       No data to display                  Patient Supplied Answers to Dyspnea Index Questionnaire:       No data to display                    Thank you for the kind referral and for allowing me to share in the care of Ms. Patel. If you have any questions, please do not hesitate to contact me.    Gloria Estevez MD    Laryngology    [facility]  Department of  Otolaryngology - Head and Neck Surgery  [facility]  [address] [place] [address]  Appointment line: [phone]  Fax: [phone]  [email or web address]

## 2024-12-17 NOTE — PROGRESS NOTES
SPEECH LANGUAGE PATHOLOGY EVALUATION       Fall Risk Screen:  Fall screen completed by: SLP  Have you fallen 2 or more times in the past year?: No  Have you fallen and had an injury in the past year?: No  Is patient a fall risk?: No    Subjective        Presenting condition or subjective complaint:   Pt is a 59 year old female that was seen by speech therpay today for a clinical swallow evaluation visulaized under endoscopy performed by MD at the request of ENT. Pt is being seen today for chronic coughing. Pt reports that at times when she is eating she does have coughing and throat clearing. Swallow assessment indicated to rule out aspiration.   Date of onset:      Relevant medical history:     Dates & types of surgery:      Prior diagnostic imaging/testing results:       Prior therapy history for the same diagnosis, illness or injury:        Living Environment  Social support:     Help at home:    Equipment owned:       Employment:      Hobbies/Interests:      Patient goals for therapy:      Pain assessment: Pain denied     Objective     SWALLOW EVALUTION  Oral Motor Function: Dentition: adequate dentition  Secretion management: WFL  Mucosal quality: adequate  Mandibular function: intact  Oral labial function: WFL  Lingual function: WFL  Velar function: intact   Buccal function: intact  Laryngeal function: cough, throat clear, volitional swallow, voicing WFL     Level of assist required for feeding: no assistance needed   Textures Trialed:   Clinical Swallow Eval: Thin Liquids  Mode of presentation: straw   Volume presented: 4oz  Preparatory Phase: WFL  Oral Phase: WFL  Pharyngeal phase of swallow: intact   Diagnostic statement: PO trials evaluated under endoscopy completed by MD. No penetration/aspiration or significant residuals.     Clinical Swallow Eval: Purees  Mode of presentation: spoon   Volume presented: 3 tablespoons  Preparatory Phase: WFL  Oral Phase: WFL  Pharyngeal phase of swallow: intact    Diagnostic statement: PO trials evaluated under endoscopy completed by MD. No penetration/aspiration or significant residuals.     Clinical Swallow Eval: Solids  Mode of presentation: self-fed   Volume presented: 1 Mirela vogt  Preparatory Phase: WFL  Oral Phase: WFL  Pharyngeal phase of swallow: intact   Diagnostic statement: PO trials evaluated under endoscopy completed by MD. No penetration/aspiration or significant residuals.     ESOPHAGEAL PHASE OF SWALLOW  patient presents with symptoms of esophageal dysphagia     SWALLOW ASSESSMENT CLINICAL IMPRESSIONS AND RATIONALE  Diet Consistency Recommendations: thin liquids (level 0), regular diet    Recommended Feeding/Eating Techniques: small bolus size, slow rate of intake, alternate food and liquid intake   Medication Administration Recommendations: pills with water  Instrumental Assessment Recommendations:  none      Assessment & Plan   CLINICAL IMPRESSIONS   Medical Diagnosis: Chronic coughing    Treatment Diagnosis: Safe, functional oropharyngeal swallow   Impression/Assessment: Overall, pt presents with a safe, functional oropharyngeal swallow. Oral motor assessment demonstrates adequate lingual, labial and buccal strength. Oral phase demonstrates adequate AP transit of bolus and adequate bolus manipulation. Pharyngeal phase demonstrates a timely swallow response, adequate base of tongue retraction and pharyngeal constriction. No penetration or aspiration was observed today on any trials. Pt did not have significant pharyngeal residue that would indicate weakness. Recommend Pt continue with a regular diet with thin liquids. Pt was educated on the anatomy and the results of the swallow assessment. Pt was educated on recommendations and agrees with the plan of care.        PLAN OF CARE  Treatment Interventions:  Evaluation only    Recommended Referrals to Other Professionals: none  Education Assessment:   Learner/Method: Patient  Education Comments: results of  swallow assessment; Pt reports understanding    Risks and benefits of evaluation/treatment have been explained.   Patient/Family/caregiver agrees with Plan of Care.     Evaluation Time:    SLP Eval: oral/pharyngeal swallow function, clinical minutes (03543): 30    Evaluation Only     Signing Clinician: Brisa Feliciano

## 2024-12-18 DIAGNOSIS — R05.9 COUGH, UNSPECIFIED TYPE: Primary | ICD-10-CM

## 2024-12-30 ENCOUNTER — TRANSFERRED RECORDS (OUTPATIENT)
Dept: HEALTH INFORMATION MANAGEMENT | Facility: CLINIC | Age: 59
End: 2024-12-30
Payer: COMMERCIAL

## 2025-01-07 ENCOUNTER — TELEPHONE (OUTPATIENT)
Dept: OTOLARYNGOLOGY | Facility: CLINIC | Age: 60
End: 2025-01-07

## 2025-01-07 ENCOUNTER — VIRTUAL VISIT (OUTPATIENT)
Dept: FAMILY MEDICINE | Facility: CLINIC | Age: 60
End: 2025-01-07
Payer: COMMERCIAL

## 2025-01-07 ENCOUNTER — DOCUMENTATION ONLY (OUTPATIENT)
Dept: SPEECH THERAPY | Facility: CLINIC | Age: 60
End: 2025-01-07

## 2025-01-07 DIAGNOSIS — R05.3 CHRONIC COUGH: Primary | ICD-10-CM

## 2025-01-07 DIAGNOSIS — I10 BENIGN ESSENTIAL HYPERTENSION WITH TARGET BLOOD PRESSURE BELOW 140/90: ICD-10-CM

## 2025-01-07 PROCEDURE — 98005 SYNCH AUDIO-VIDEO EST LOW 20: CPT | Performed by: FAMILY MEDICINE

## 2025-01-07 NOTE — TELEPHONE ENCOUNTER
Left Voicemail (1st Attempt) for the patient to call back and schedule the following:    Appointment Type: Return ENT SLP video  Provider: PATO Nieto   Appt date: next available  Specialty phone number: [phone]  Additional appointment(s) needed:   Additional Notes: 4 biweekly visits,

## 2025-01-07 NOTE — PATIENT INSTRUCTIONS
Please stop lisinopril for one week. Please let me know if you do find that stopping the lisinopril does help the cough, we will need to follow your blood pressures to make sure they stay at goal.

## 2025-01-07 NOTE — PROGRESS NOTES
Mercedes is a 59 year old who is being evaluated via a billable video visit.          Assessment & Plan     ASSESSMENT / PLAN:  (R05.3) Chronic cough  (primary encounter diagnosis)  See HPI, still undergoing work up.  Recommend stopping ACE As noted below, patient instructions reviewed with her. Restart medication if no change in symptoms noted.    (I10) Benign essential hypertension with target blood pressure below 140/90  Comment: at goal  Plan: follow closely during medication changes.      Patient Instructions   Please stop lisinopril for one week. Please let me know if you do find that stopping the lisinopril does help the cough, we will need to follow your blood pressures to make sure they stay at goal.           Subjective   Chronic cough  She's working with a GI specialist to evaluate her chronic cough, which has gotten worse over the past year. She's been on lisinopril for years. Her blood pressure has generally been well controlled, 101/72 this morning.  She had a single elevated blood pressure recorded when she was rushing into clinic, otherwise her blood pressure has been excellent.  She hasn't started an antacid or other treatments yet, may be doing ST in the future for possible reactive cough.    GFR Estimate   Date Value Ref Range Status   10/04/2024 87 >60 mL/min/1.73m2 Final     Comment:     eGFR calculated using 2021 CKD-EPI equation.   05/19/2021 88 >60 mL/min/[1.73_m2] Final     Comment:     Non  GFR Calc  Starting 12/18/2018, serum creatinine based estimated GFR (eGFR) will be   calculated using the Chronic Kidney Disease Epidemiology Collaboration   (CKD-EPI) equation.           BP Readings from Last 3 Encounters:   12/17/24 (!) 154/100   12/12/24 123/84   10/04/24 100/70      Mercedes is a 59 year old, presenting for the following health issues:  No chief complaint on file.        10/4/2024    10:05 AM   Additional Questions   Roomed by THOR Cortes   Accompanied by Self     History  of Present Illness       Hypertension: She presents for follow up of hypertension.  She does not check blood pressure  regularly outside of the clinic. Outpatient blood pressures have not been over 140/90. She follows a low salt diet.     She eats 2-3 servings of fruits and vegetables daily.She consumes 0 sweetened beverage(s) daily.She exercises with enough effort to increase her heart rate 9 or less minutes per day.  She exercises with enough effort to increase her heart rate 3 or less days per week.   She is taking medications regularly.           Review of Systems  Constitutional, HEENT, cardiovascular, pulmonary, gi and gu systems are negative, except as otherwise noted.      Objective           Vitals:  No vitals were obtained today due to virtual visit.    Physical Exam   GENERAL: alert and no distress  EYES: Eyes grossly normal to inspection.  No discharge or erythema, or obvious scleral/conjunctival abnormalities.  RESP: No audible wheeze, cough, or visible cyanosis.    SKIN: Visible skin clear. No significant rash, abnormal pigmentation or lesions.  NEURO: Cranial nerves grossly intact.  Mentation and speech appropriate for age.  PSYCH: Appropriate affect, tone, and pace of words        Video-Visit Details    Type of service:  Video Visit   Originating Location (pt. Location): Home    Distant Location (provider location):  Off-site  Platform used for Video Visit: Martin  Signed Electronically by: Harika Montoya MD

## 2025-01-21 NOTE — PROGRESS NOTES
GI CLINIC VISIT    CC: Follow up constipation and belching symptom    ASSESSMENT/PLAN:  1. Chronic constipation  Tends towards bristol type 1 stools and will skip days. Uses miralax 1 cap daily as needed.  When she tried to increase this she had intermittent fecal incontinence when she would go on walks.  She started on Benefiber 3 teaspoons daily which has seemed to help a little bit.  Tried other fibers in the past alone which made bloating worse. Does not typically feel she is completely evacuating.  She tried magnesium oxide 500 mg nightly for a month with no change, so discontinued. She had an abdominal xray showing large stool burden. She did not have a chance to do a bowel cleanse yet.    She wonders if pelvic floor could be playing a role here.  She has had longstanding constipation and she has had 3 vaginal deliveries.  She is also experiencing some urinary continence as below.  We discussed the option of [place] pelvic floor PT versus pelvic floor center.  She would prefer to start with pelvic floor PT here and referral was placed at last visit. She is holding off until she completes PT for her back.    Recommend starting Linzess 72 mcg daily in addition to miralax 1 cap daily and Benefiber 3 tsp daily.     2. Urinary incontinence  She has been experiencing intermittent urinary incontinence.  Referral to pelvic floor PT for this as well.    3. Chronic cough  4. Belching  Known small hiatal hernia with some acid reflux at times. Belching symptom has stopped. She did not feel 2 months of 40 mg daily omeprazole made any difference with the belching but did help reflux.  She has also had a chronic cough intermittently with a globus sensation that feels like a liquid sitting in the base of her neck.  She has a history of chronic sinusitis.  She completed pH impedance manometry study off PPI and waiting on results.     She saw ENT but by that time she was able to belch. This is not retrograde cricopharyngeal  "dysfunction. Symptoms suspected to be related to LPR and irritable larynx syndrome. She was recommended to come off lisinopril and obtain a CXR. She found that stopping lisinopril has improved the cough a little bit. Plans to schedule CXR soon.     Recommend retrial on omeprazole 40 mg BID for longer than 2 months to see if this helps the chronic cough since ENT felt it is related to LPR and irritable larynx syndrome.        Specialty Problems          Gastroenterology Diagnoses    Diverticulosis of large intestine           RTC 3 months    It was a pleasure to participate in the care of this patient; please contact us with any further questions.  A total of 30 face-to-face minutes was spent with this patient, >50% of which was counseling regarding the above delineated issues. An additional 15 minutes was spent on the date of the encounter doing chart review, documentation, and further activities as noted above.    Carloat Rodriguez PA-C  Division of Gastroenterology, Hepatology and Nutrition  [facility]    ---------------------------------------------------------------------------------------------------  HPI:  Mercedes Patel is a 59 year old female with past medical history significant for hypertension, uterine leiomyoma, nontoxic multinodular goiter, history of chronic sinusitis who presents for chronic constipation and sensation of air trapping in the esophagus.      Initial history 5/2024  She has had constipation for 10+ years with lower abdominal pain. Uses metamucil and miralax. Feels incomplete. Has daily bowel movements. There is a CT abd/pelvis from 2012 showing large amount of stool throughout the colon.      She reports that 10 years ago had a 20 lb unexplained wt loss with normal EGD.      More recently, she has a sensation like she \"needs to belch but it won't come out\" intermittently. States it makes a constant sound when lays on left side at night, but when she rolls to her right it goes " away. Eventually she is able to roll back to her left without producing the sound. It is not painful, but is annoying. She feels the sensation in her upper central chest. She states it will happen when she is at work upright at times too. This has been a daily occurrence for the last 6 months. She tries to stop eating at 7 pm before bed. Her  is able to hear it when it occurs. It does not seem correlated with meals. She does intermittent fasting and it will happen both nonfasting and fasting. She is otherwise able to belch, but during the times that it comes on, she cannot.      She does have occasional acid reflux, no heartburn and rare regurgitation. Several months ago she does remember waking in the night with acid and choked on the acid. This only happened once. She denies any dysphagia to solids or liquids. Denies odynophagia, globus sensation, nausea, vomiting, or any chest or abdominal pain.      She does have a chronic cough after eating. Feels an irritation in her throat that brings on the cough. It is a dry cough. Does not occur much at night. History of chronic sinusitis, not so bad now that she has been treated for environmental allergies. No history of asthma or eczema.      She reports constipation has been worse in the last year. States she has always had constipation. She tried fiber but felt it made symptoms worse. Tried both the powders and the tablets of citrucel, metamucil, flax seeds. Gets relief from miralax daily prn - typically takes for a week when constipated 1 cap per day. Did take daily for couple months, was having small bowel movements type 5, soft, incomplete sensation and some fecal incontinence a few times in the last year. When she takes miralax, bowel movements are daily. Off miralax, she will skip a few days without a bowel movement. Typically bristol type 1 stools.      Alleviating/treatments tried -- tried avoiding different foods, avoids spicy, avoids lots of carbs       Family history -- mom with chronic constipation and had 6 feet of bowel removed (elective), maternal grandfather in 60s-70s had colon cancer, no celiac or crohns or celiac  NSAID use-- ibuprofen for aches and pains, not daily but somewhat regularly  No recent antibiotics in last 3 years, but in the past for recurrent sinusitis would be on abx every few months   Hx abd surgeries-- no   Hx colonoscopy -- 2016 at age 51, for screening. Prep was good. There was one polyp (hyperplastic) and diverticulosis present. Recommended to repeat in 2026.   EGD 2012     Interval history 8/2024:   EGD was done in June showing 2 cm HH, hill grade IV flap valve, fundic gland polyp in the stomach.  Esophagus, stomach and duodenum were normal.    She reports she has been doing better over the last month.  The only thing she changed with her routine is that she stopped eating yogurt.  She was on omeprazole 40 mg daily for 2 months but this did not seem to change anything with the belching sound.  She reports it stopped as mysteriously as it started.  She was still experiencing this noise when she would lay on her left side or in the morning when she got to work.    Does have a chronic cough intermitently. The omeprazole doesn't help with this. It comes on suddenly. Usually once per day will have a coughing spell. A lot of times feels there is some liquid sitting at the base of the neck.  She feels she may have some postnasal drip.  She denies any runny nose.  Has a history of chronic sinusitis and has been on multiple rounds of antibiotics in the past.  States she only has regurgitation or heartburn when she eats pizza with onions.  She takes omeprazole once as needed intermittently for this which is effective.    Bowel movements are still irregular.  She tried 1 capful of MiraLAX as needed but reports she would have incontinence on a walk and it did not seem to make her any more regular.  She has been taking 2 teaspoons of Benefiber  "daily which does seem to help a little bit but she has still been constipated with Sabin type I stools.  She has some bloating and lower abdominal discomfort but not any worse with the Benefiber.  She has had some gas pains and tried Gas-X which was minimally helpful.    Interval history 1/2025:   She has come off of lisinopril. Doesn't feel like it completely resolved the cough, but it did improve. Has been off of it for several weeks now and her PCP would like her return to it for kidney protection. Plans to have a CXR.     Belching symptom has improved. She doesn't feel this is an issue. Struggling more with reflux and cough.     Reflux has actually been okay lately. Not having as much regurgitation. Not taking any medications for it. If she has heartburn once in a while which is infrequent, will take prilosec. This does help but not right away. Takes once or twice every 2 weeks. She thinks omeprazole might have been helpful for acid sensation, but not with the cough.    Bowel movements haven't been good. Feels like a revolving cycle. She takes miralax. Has a lot of incomplete sensation. Miralax 1 capful daily gets to be too much, then has some fecal incontinence. Stops for a week, then without a bowel movement for a while. Hasn't had time to do a cleanse yet. Taking 3 tsp Benefiber and high fiber diet. Took magnesium oxide 500 mg nightly for a month with no improvement.    Hasn't done pelvic floor PT yet. Doing PT for her back first.     ROS:    A 10 point review of systems was performed and is negative except as noted in the HPI.       PHYSICAL EXAMINATION:  Vitals BP (!) 139/98   Pulse 84   Ht 1.6 m (5' 3\")   Wt 63.5 kg (139 lb 14.4 oz)   SpO2 99%   BMI 24.78 kg/m     Wt   Wt Readings from Last 2 Encounters:   01/22/25 63.5 kg (139 lb 14.4 oz)   12/17/24 61.2 kg (135 lb)      Gen: Pt sitting up in NAD, interactive and cooperative on exam  Eyes: sclerae anicteric, no injection  Resp: Unlabored " breathing  GI: normoactive bowel sounds, abdomen is slightly bloated, uncomfortable but no localized pain to palpation throughout all quadrants, no organomegaly or masses palpated  Skin: Warm, dry, no jaundice, nails appear healthy  Neuro: alert, oriented, answers questions appropriately

## 2025-01-22 ENCOUNTER — OFFICE VISIT (OUTPATIENT)
Dept: GASTROENTEROLOGY | Facility: CLINIC | Age: 60
End: 2025-01-22
Attending: STUDENT IN AN ORGANIZED HEALTH CARE EDUCATION/TRAINING PROGRAM
Payer: COMMERCIAL

## 2025-01-22 ENCOUNTER — ANCILLARY PROCEDURE (OUTPATIENT)
Dept: GENERAL RADIOLOGY | Facility: CLINIC | Age: 60
End: 2025-01-22
Attending: OTOLARYNGOLOGY
Payer: COMMERCIAL

## 2025-01-22 VITALS
BODY MASS INDEX: 24.79 KG/M2 | OXYGEN SATURATION: 99 % | DIASTOLIC BLOOD PRESSURE: 98 MMHG | HEIGHT: 63 IN | HEART RATE: 84 BPM | SYSTOLIC BLOOD PRESSURE: 139 MMHG | WEIGHT: 139.9 LBS

## 2025-01-22 DIAGNOSIS — K59.04 CHRONIC IDIOPATHIC CONSTIPATION: Primary | ICD-10-CM

## 2025-01-22 DIAGNOSIS — K21.9 GASTROESOPHAGEAL REFLUX DISEASE WITHOUT ESOPHAGITIS: ICD-10-CM

## 2025-01-22 DIAGNOSIS — R05.9 COUGH, UNSPECIFIED TYPE: ICD-10-CM

## 2025-01-22 DIAGNOSIS — R05.3 CHRONIC COUGH: ICD-10-CM

## 2025-01-22 PROCEDURE — 71046 X-RAY EXAM CHEST 2 VIEWS: CPT | Performed by: RADIOLOGY

## 2025-01-22 RX ORDER — OMEPRAZOLE 40 MG/1
40 CAPSULE, DELAYED RELEASE ORAL
Qty: 180 CAPSULE | Refills: 1 | Status: SHIPPED | OUTPATIENT
Start: 2025-01-22

## 2025-01-22 ASSESSMENT — PAIN SCALES - GENERAL: PAINLEVEL_OUTOF10: NO PAIN (0)

## 2025-01-22 NOTE — PATIENT INSTRUCTIONS
It was a pleasure meeting with you today and discussing your healthcare plan. Below is a summary of what we covered:    - Linzess 72 mcg daily sent to pharmacy for constipation   - Continue daily miralax as tolerated and 3 tsp of Benefiber daily   -  Start omeprazole 40 mg twice daily before meals for at least until we follow up   - Follow up in 3 months     Please see below for any additional questions and scheduling guidelines.    Sign up for [employer]: [employer] patient portal serves as a secure platform for accessing your medical records from the [facility]. Additionally, [employer] facilitates easy, timely, and secure messaging with your care team. If you have not signed up, you may do so by using the provided code or calling [phone].    Coordinating your care after your visit:  There are multiple options for scheduling your follow-up care based on your provider's recommendation.    How do I schedule a follow-up clinic appointment:   After your appointment, you may receive scheduling assistance with the Clinic Coordinators by having a seat in the waiting room and a Clinic Coordinator will call you up to schedule.  Virtual visits or after you leave the clinic:  Your provider has placed a follow-up order in the [employer] portal for scheduling your return appointment. A member of the scheduling team will contact you to schedule.  [employer] Scheduling: Timely scheduling through [employer] is advised to ensure appointment availability.   Call to schedule: You may schedule your follow-up appointment(s) by calling [phone], option 1.    How do I schedule my endoscopy or colonoscopy procedure:  If a procedure, such as a colonoscopy or upper endoscopy was ordered by your provider, the scheduling team will contact you to schedule this procedure. Or you may choose to call to schedule at   [phone], option 2.  Please allow 20-30 minutes when scheduling a procedure.    How do I get my blood work done? To get  your blood work done, you need to schedule a lab appointment at an [facility]. There are multiple ways to schedule:   At the clinic: The Clinic Coordinator you meet after your visit can help you schedule a lab appointment.   [employer] scheduling: [employer] offers online lab scheduling at all [facility] laboratory locations.   Call to schedule: You can call [phone] to schedule your lab appointment.    How do I schedule my imaging study: To schedule imaging studies, such as CT scans, ultrasounds, MRIs, or X-rays, contact Imaging Services at [phone].    How do I schedule a referral to another doctor: If your provider recommended a referral to another specialist(s), the referral order was placed by your provider. You will receive a phone call to schedule this referral, or you may choose to call the number attached to the referral to self-schedule.    For Post-Visit Question(s):  For any inquiries following today's visit:  Please utilize [employer] messaging and allow 48 hours for reply or contact the Call Center during normal business hours at [phone], option 3.  For Emergent After-hours questions, contact the On-Call GI Fellow through the [facility]  at [phone].  In addition, you may contact your Nurse directly using the provided contact information.    Test Results:  Test results will be accessible via [employer] in compliance with the 21st Century Cures Act. This means that your results will be available to you at the same time as your provider. Often you may see your results before your provider does. Results are reviewed by staff within two weeks with communication follow-up. Results may be released in the patient portal prior to your care team review.    Prescription Refill(s):  Medication prescribed by your provider will be addressed during your visit. For future refills, please coordinate with your pharmacy. If you have not had a recent clinic visit or  routine labs, for your safety, your provider may not be able to refill your prescription.

## 2025-01-22 NOTE — NURSING NOTE
"Do you have a history of colon cancer in your immediate family? YES    If yes who: maternal grandfather     And what age  were they diagnosed: 70      Chief Complaint   Patient presents with    Follow Up       Vitals:    01/22/25 0831   BP: (!) 139/98   Pulse: 84   SpO2: 99%   Weight: 63.5 kg (139 lb 14.4 oz)   Height: 1.6 m (5' 3\")       Body mass index is 24.78 kg/m .    Pollo Bella MA  Blood pressure elevated. Provider notified. Recheck offered.     "

## 2025-01-22 NOTE — LETTER
1/22/2025      Mercedes Patel  [address]  [facility] [address]      Dear Colleague,    Thank you for referring your patient, Mercedes Patel, to the [facility] [place]. Please see a copy of my visit note below.    GI CLINIC VISIT    CC: Follow up constipation and belching symptom    ASSESSMENT/PLAN:  1. Chronic constipation  Tends towards bristol type 1 stools and will skip days. Uses miralax 1 cap daily as needed.  When she tried to increase this she had intermittent fecal incontinence when she would go on walks.  She started on Benefiber 3 teaspoons daily which has seemed to help a little bit.  Tried other fibers in the past alone which made bloating worse. Does not typically feel she is completely evacuating.  She tried magnesium oxide 500 mg nightly for a month with no change, so discontinued. She had an abdominal xray showing large stool burden. She did not have a chance to do a bowel cleanse yet.    She wonders if pelvic floor could be playing a role here.  She has had longstanding constipation and she has had 3 vaginal deliveries.  She is also experiencing some urinary continence as below.  We discussed the option of [place] pelvic floor PT versus pelvic floor center.  She would prefer to start with pelvic floor PT here and referral was placed at last visit. She is holding off until she completes PT for her back.    Recommend starting Linzess 72 mcg daily in addition to miralax 1 cap daily and Benefiber 3 tsp daily.     2. Urinary incontinence  She has been experiencing intermittent urinary incontinence.  Referral to pelvic floor PT for this as well.    3. Chronic cough  4. Belching  Known small hiatal hernia with some acid reflux at times. Belching symptom has stopped. She did not feel 2 months of 40 mg daily omeprazole made any difference with the belching but did help reflux.  She has also had a chronic cough intermittently with a globus sensation that  feels like a liquid sitting in the base of her neck.  She has a history of chronic sinusitis.  She completed pH impedance manometry study off PPI and waiting on results.     She saw ENT but by that time she was able to belch. This is not retrograde cricopharyngeal dysfunction. Symptoms suspected to be related to LPR and irritable larynx syndrome. She was recommended to come off lisinopril and obtain a CXR. She found that stopping lisinopril has improved the cough a little bit. Plans to schedule CXR soon.     Recommend retrial on omeprazole 40 mg BID for longer than 2 months to see if this helps the chronic cough since ENT felt it is related to LPR and irritable larynx syndrome.        Specialty Problems          Gastroenterology Diagnoses    Diverticulosis of large intestine           RTC 3 months    It was a pleasure to participate in the care of this patient; please contact us with any further questions.  A total of 30 face-to-face minutes was spent with this patient, >50% of which was counseling regarding the above delineated issues. An additional 15 minutes was spent on the date of the encounter doing chart review, documentation, and further activities as noted above.    Carlota Rodriguez PA-C  Division of Gastroenterology, Hepatology and Nutrition  [facility]    ---------------------------------------------------------------------------------------------------  HPI:  Mercedes Patel is a 59 year old female with past medical history significant for hypertension, uterine leiomyoma, nontoxic multinodular goiter, history of chronic sinusitis who presents for chronic constipation and sensation of air trapping in the esophagus.      Initial history 5/2024  She has had constipation for 10+ years with lower abdominal pain. Uses metamucil and miralax. Feels incomplete. Has daily bowel movements. There is a CT abd/pelvis from 2012 showing large amount of stool throughout the colon.      She reports that 10 years  "ago had a 20 lb unexplained wt loss with normal EGD.      More recently, she has a sensation like she \"needs to belch but it won't come out\" intermittently. States it makes a constant sound when lays on left side at night, but when she rolls to her right it goes away. Eventually she is able to roll back to her left without producing the sound. It is not painful, but is annoying. She feels the sensation in her upper central chest. She states it will happen when she is at work upright at times too. This has been a daily occurrence for the last 6 months. She tries to stop eating at 7 pm before bed. Her  is able to hear it when it occurs. It does not seem correlated with meals. She does intermittent fasting and it will happen both nonfasting and fasting. She is otherwise able to belch, but during the times that it comes on, she cannot.      She does have occasional acid reflux, no heartburn and rare regurgitation. Several months ago she does remember waking in the night with acid and choked on the acid. This only happened once. She denies any dysphagia to solids or liquids. Denies odynophagia, globus sensation, nausea, vomiting, or any chest or abdominal pain.      She does have a chronic cough after eating. Feels an irritation in her throat that brings on the cough. It is a dry cough. Does not occur much at night. History of chronic sinusitis, not so bad now that she has been treated for environmental allergies. No history of asthma or eczema.      She reports constipation has been worse in the last year. States she has always had constipation. She tried fiber but felt it made symptoms worse. Tried both the powders and the tablets of citrucel, metamucil, flax seeds. Gets relief from miralax daily prn - typically takes for a week when constipated 1 cap per day. Did take daily for couple months, was having small bowel movements type 5, soft, incomplete sensation and some fecal incontinence a few times in the last " year. When she takes miralax, bowel movements are daily. Off miralax, she will skip a few days without a bowel movement. Typically bristol type 1 stools.      Alleviating/treatments tried -- tried avoiding different foods, avoids spicy, avoids lots of carbs      Family history -- mom with chronic constipation and had 6 feet of bowel removed (elective), maternal grandfather in 60s-70s had colon cancer, no celiac or crohns or celiac  NSAID use-- ibuprofen for aches and pains, not daily but somewhat regularly  No recent antibiotics in last 3 years, but in the past for recurrent sinusitis would be on abx every few months   Hx abd surgeries-- no   Hx colonoscopy -- 2016 at age 51, for screening. Prep was good. There was one polyp (hyperplastic) and diverticulosis present. Recommended to repeat in 2026.   EGD 2012     Interval history 8/2024:   EGD was done in June showing 2 cm HH, hill grade IV flap valve, fundic gland polyp in the stomach.  Esophagus, stomach and duodenum were normal.    She reports she has been doing better over the last month.  The only thing she changed with her routine is that she stopped eating yogurt.  She was on omeprazole 40 mg daily for 2 months but this did not seem to change anything with the belching sound.  She reports it stopped as mysteriously as it started.  She was still experiencing this noise when she would lay on her left side or in the morning when she got to work.    Does have a chronic cough intermitently. The omeprazole doesn't help with this. It comes on suddenly. Usually once per day will have a coughing spell. A lot of times feels there is some liquid sitting at the base of the neck.  She feels she may have some postnasal drip.  She denies any runny nose.  Has a history of chronic sinusitis and has been on multiple rounds of antibiotics in the past.  States she only has regurgitation or heartburn when she eats pizza with onions.  She takes omeprazole once as needed  "intermittently for this which is effective.    Bowel movements are still irregular.  She tried 1 capful of MiraLAX as needed but reports she would have incontinence on a walk and it did not seem to make her any more regular.  She has been taking 2 teaspoons of Benefiber daily which does seem to help a little bit but she has still been constipated with [place] type I stools.  She has some bloating and lower abdominal discomfort but not any worse with the Benefiber.  She has had some gas pains and tried Gas-X which was minimally helpful.    Interval history 1/2025:   She has come off of lisinopril. Doesn't feel like it completely resolved the cough, but it did improve. Has been off of it for several weeks now and her PCP would like her return to it for kidney protection. Plans to have a CXR.     Belching symptom has improved. She doesn't feel this is an issue. Struggling more with reflux and cough.     Reflux has actually been okay lately. Not having as much regurgitation. Not taking any medications for it. If she has heartburn once in a while which is infrequent, will take prilosec. This does help but not right away. Takes once or twice every 2 weeks. She thinks omeprazole might have been helpful for acid sensation, but not with the cough.    Bowel movements haven't been good. Feels like a revolving cycle. She takes miralax. Has a lot of incomplete sensation. Miralax 1 capful daily gets to be too much, then has some fecal incontinence. Stops for a week, then without a bowel movement for a while. Hasn't had time to do a cleanse yet. Taking 3 tsp Benefiber and high fiber diet. Took magnesium oxide 500 mg nightly for a month with no improvement.    Hasn't done pelvic floor PT yet. Doing PT for her back first.     ROS:    A 10 point review of systems was performed and is negative except as noted in the HPI.       PHYSICAL EXAMINATION:  Vitals BP (!) 139/98   Pulse 84   Ht 1.6 m (5' 3\")   Wt 63.5 kg (139 lb 14.4 oz)   " SpO2 99%   BMI 24.78 kg/m     Wt   Wt Readings from Last 2 Encounters:   01/22/25 63.5 kg (139 lb 14.4 oz)   12/17/24 61.2 kg (135 lb)      Gen: Pt sitting up in NAD, interactive and cooperative on exam  Eyes: sclerae anicteric, no injection  Resp: Unlabored breathing  GI: normoactive bowel sounds, abdomen is slightly bloated, uncomfortable but no localized pain to palpation throughout all quadrants, no organomegaly or masses palpated  Skin: Warm, dry, no jaundice, nails appear healthy  Neuro: alert, oriented, answers questions appropriately      Again, thank you for allowing me to participate in the care of your patient.        Sincerely,        Carlota Rodriguez PA-C    Electronically signed

## 2025-01-24 ENCOUNTER — TRANSFERRED RECORDS (OUTPATIENT)
Dept: HEALTH INFORMATION MANAGEMENT | Facility: CLINIC | Age: 60
End: 2025-01-24

## 2025-02-02 ENCOUNTER — MYC MEDICAL ADVICE (OUTPATIENT)
Dept: FAMILY MEDICINE | Facility: CLINIC | Age: 60
End: 2025-02-02
Payer: COMMERCIAL

## 2025-02-04 NOTE — TELEPHONE ENCOUNTER
Dr. Montoya--- please review MyChart message and advise.    Rudolph Morrison, BSN, PHN, RN-[facility]

## 2025-02-06 PROCEDURE — 91038 ESOPH IMPED FUNCT TEST > 1HR: CPT | Performed by: INTERNAL MEDICINE

## 2025-02-06 NOTE — TELEPHONE ENCOUNTER
I sent the following Photofy message, nothing further to do, encounter closed.    Sincerely,  Dr. Harika Montoya MD  2/6/2025 10:03 AM      If it's not too much bother could you please come in?  I just want to get a clinic recheck of your blood pressure.  I'd really appreciate it!    Sincerely,  Dr. Harika Montoya MD  2/6/2025

## 2025-03-20 ENCOUNTER — OFFICE VISIT (OUTPATIENT)
Dept: FAMILY MEDICINE | Facility: CLINIC | Age: 60
End: 2025-03-20
Payer: COMMERCIAL

## 2025-03-20 VITALS
OXYGEN SATURATION: 95 % | TEMPERATURE: 97.5 F | SYSTOLIC BLOOD PRESSURE: 138 MMHG | DIASTOLIC BLOOD PRESSURE: 78 MMHG | RESPIRATION RATE: 16 BRPM | BODY MASS INDEX: 25.23 KG/M2 | HEART RATE: 72 BPM | HEIGHT: 62 IN | WEIGHT: 137.1 LBS

## 2025-03-20 DIAGNOSIS — I10 BENIGN ESSENTIAL HYPERTENSION WITH TARGET BLOOD PRESSURE BELOW 140/90: ICD-10-CM

## 2025-03-20 DIAGNOSIS — R09.89 RESPIRATORY SYMPTOMS: Primary | ICD-10-CM

## 2025-03-20 LAB
FLUAV AG SPEC QL IA: NEGATIVE
FLUBV AG SPEC QL IA: NEGATIVE

## 2025-03-20 RX ORDER — LOSARTAN POTASSIUM 25 MG/1
25 TABLET ORAL DAILY
Qty: 30 TABLET | Refills: 3 | Status: SHIPPED | OUTPATIENT
Start: 2025-03-20

## 2025-03-20 ASSESSMENT — PAIN SCALES - GENERAL: PAINLEVEL_OUTOF10: NO PAIN (0)

## 2025-03-20 NOTE — RESULT ENCOUNTER NOTE
Patient was seen today in clinic.  I discussed results in clinic, please see clinic progress note.    Harika Montoya MD 3/20/2025

## 2025-03-20 NOTE — PROGRESS NOTES
"  Assessment & Plan     (R09.89) Respiratory symptoms  (primary encounter diagnosis)  Comment: testing negative for flu, covid pending, symptoms not consistent with strep, possibly RSV, but given treatment would be supportive, and she has been using over the counter treatments that are effective, I don't recommend testing for this.  Plan: Influenza A & B Antigen - Clinic Collect,         COVID-19 Virus (Coronavirus) by PCR Nose          (I10) Benign essential hypertension with target blood pressure below 140/90  Comment: not quite at goal and with side effects on lisinopril, will start losartan, follow up 2 weeks.  Plan: losartan (COZAAR) 25 MG tablet, Basic metabolic        panel  (Ca, Cl, CO2, Creat, Gluc, K, Na, BUN)          BP Readings from Last 3 Encounters:   03/20/25 138/78   01/22/25 (!) 139/98   12/17/24 (!) 154/100      .  GFR Estimate   Date Value Ref Range Status   10/04/2024 87 >60 mL/min/1.73m2 Final     Comment:     eGFR calculated using 2021 CKD-EPI equation.   05/19/2021 88 >60 mL/min/[1.73_m2] Final     Comment:     Non  GFR Calc  Starting 12/18/2018, serum creatinine based estimated GFR (eGFR) will be   calculated using the Chronic Kidney Disease Epidemiology Collaboration   (CKD-EPI) equation.                 Subjective   Mercedes is a 59 year old, presenting for the following health issues:  URI  Cough, sore throat and headache started Monday, 3 days ago, with fatigue. She describes a \"barky\" cough. 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 Alternating acetaminophen and ibuprofen, and taking mucinex which has helped a lot.  She denies fever, aching. She hasn't been sick since covid started.    Her daughter and daughter-in-law had Influenza A several months ago.    Hypertension and URI        3/20/2025     4:43 PM   Additional Questions   Roomed by Christi CRUZ     History of Present Illness       Hypertension: She presents for follow up of hypertension.  She does " "check blood pressure  regularly outside of the clinic. Outside blood pressures have been over 140/90. She follows a low salt diet.     She eats 2-3 servings of fruits and vegetables daily.She consumes 0 sweetened beverage(s) daily.She exercises with enough effort to increase her heart rate 9 or less minutes per day.  She exercises with enough effort to increase her heart rate 4 days per week.   She is taking medications regularly.          Hypertension Follow-up    Do you check your blood pressure regularly outside of the clinic? Yes   Are you following a low salt diet? Yes  Are your blood pressures ever more than 140 on the top number (systolic) OR more   than 90 on the bottom number (diastolic), for example 140/90? Yes diastolic can be up to 90.  She's doing physical therapy, walks sometimes.    How many servings of fruits and vegetables do you eat daily?  4 or more  On average, how many sweetened beverages do you drink each day (Examples: soda, juice, sweet tea, etc.  Do NOT count diet or artificially sweetened beverages)?   0  How many days per week do you exercise enough to make your heart beat faster? 6  How many minutes a day do you exercise enough to make your heart beat faster? 30 - 60  How many days per week do you miss taking your medication? Hasn't taken medication while sick due to cough (she's on lisinopril)    Review of Systems  Constitutional, HEENT, cardiovascular, pulmonary, gi and gu systems are negative, except as otherwise noted.      Objective    /78   Pulse 72   Temp 97.5  F (36.4  C) (Temporal)   Resp 16   Ht 1.587 m (5' 2.48\")   Wt 62.2 kg (137 lb 1.6 oz)   LMP  (LMP Unknown)   SpO2 95%   BMI 24.69 kg/m    Body mass index is 24.69 kg/m .  Physical Exam   GENERAL: alert and no respiratory distress. Appears tired, non-toxic. Occasional dry cough.  NECK: no adenopathy, no asymmetry, masses, or scars  RESP: lungs clear to auscultation - no rales, rhonchi or wheezes  CV: regular " rate and rhythm, normal S1 S2, no S3 or S4, no murmur, click or rub, no peripheral edema  ABDOMEN: soft, nontender, no hepatosplenomegaly, no masses and bowel sounds normal  MS: no gross musculoskeletal defects noted, no edema    Results for orders placed or performed in visit on 03/20/25 (from the past 24 hours)   Influenza A & B Antigen - Clinic Collect    Specimen: Nasopharyngeal; Swab   Result Value Ref Range    Influenza A antigen Negative Negative    Influenza B antigen Negative Negative    Narrative    Test results must be correlated with clinical data. If necessary, results should be confirmed by a molecular assay or viral culture.           Signed Electronically by: Harika Montoya MD

## 2025-03-20 NOTE — PATIENT INSTRUCTIONS
Results for orders placed or performed in visit on 03/20/25   Influenza A & B Antigen - Clinic Collect     Status: Normal    Specimen: Nasopharyngeal; Swab   Result Value Ref Range    Influenza A antigen Negative Negative    Influenza B antigen Negative Negative    Narrative    Test results must be correlated with clinical data. If necessary, results should be confirmed by a molecular assay or viral culture.      Shingles vaccine  I strongly recommend getting the shingles vaccine (Shingrix) if you are over age 50, but please check with your insurance to see how much you might have to pay for this vaccine! If you are on most insurance plans including Medicare, it's covered if you get it at a pharmacy but not in a clinic.    This shot will prevent shingles, a painful skin rash that you are at risk for getting if you have ever had chicken pox. Sometimes the pain will last the rest of your life, even after the rash has healed, and there is not much that we can do to relieve the pain. The vaccine is 98% effective at preventing shingles.    Please consider getting this vaccine! You'll need #2 vaccines 2-4 months apart to be protected.     I also recommend getting a pneumonia vaccine (PCV20).

## 2025-03-22 LAB — SARS-COV-2 RNA RESP QL NAA+PROBE: NEGATIVE

## 2025-04-01 ENCOUNTER — TELEPHONE (OUTPATIENT)
Dept: GASTROENTEROLOGY | Facility: CLINIC | Age: 60
End: 2025-04-01
Payer: COMMERCIAL

## 2025-04-01 NOTE — TELEPHONE ENCOUNTER
Left Voicemail (2nd  Attempt) for the patient to call back and schedule the following:     Appointment type: return (reschedule)   Provider: Carlota Rodriguez   Return date: next available  Specialty phone number: [phone]  Additional appointment(s) needed:   Additional Notes:

## 2025-05-06 ENCOUNTER — LAB (OUTPATIENT)
Dept: LAB | Facility: CLINIC | Age: 60
End: 2025-05-06
Payer: COMMERCIAL

## 2025-05-06 DIAGNOSIS — I10 BENIGN ESSENTIAL HYPERTENSION WITH TARGET BLOOD PRESSURE BELOW 140/90: ICD-10-CM

## 2025-05-06 PROCEDURE — 80048 BASIC METABOLIC PNL TOTAL CA: CPT

## 2025-05-06 PROCEDURE — 36415 COLL VENOUS BLD VENIPUNCTURE: CPT

## 2025-05-07 ENCOUNTER — TELEPHONE (OUTPATIENT)
Dept: GASTROENTEROLOGY | Facility: CLINIC | Age: 60
End: 2025-05-07
Payer: COMMERCIAL

## 2025-05-07 LAB
ANION GAP SERPL CALCULATED.3IONS-SCNC: 12 MMOL/L (ref 7–15)
BUN SERPL-MCNC: 16.4 MG/DL (ref 8–23)
CALCIUM SERPL-MCNC: 9.7 MG/DL (ref 8.8–10.4)
CHLORIDE SERPL-SCNC: 104 MMOL/L (ref 98–107)
CREAT SERPL-MCNC: 0.84 MG/DL (ref 0.51–0.95)
EGFRCR SERPLBLD CKD-EPI 2021: 80 ML/MIN/1.73M2
GLUCOSE SERPL-MCNC: 109 MG/DL (ref 70–99)
HCO3 SERPL-SCNC: 25 MMOL/L (ref 22–29)
POTASSIUM SERPL-SCNC: 4.2 MMOL/L (ref 3.4–5.3)
SODIUM SERPL-SCNC: 141 MMOL/L (ref 135–145)

## 2025-05-07 NOTE — TELEPHONE ENCOUNTER
Left Voicemail (1st Attempt) for the patient to call back and schedule the following:    Appointment type: return   Provider: Carlota Rodriguez   Return date: 9/23/25  Specialty phone number: [phone]  Additional appointment(s) needed:   Additonal Notes:

## 2025-05-08 ENCOUNTER — RESULTS FOLLOW-UP (OUTPATIENT)
Dept: FAMILY MEDICINE | Facility: CLINIC | Age: 60
End: 2025-05-08

## 2025-05-08 NOTE — RESULT ENCOUNTER NOTE
Your glucose level, electrolyte level and kidney function, measured with a test called the basic metabolic panel, is normal, which is great news!     If you have any questions, please contact the clinic or schedule an appointment with me, thank you!       Sincerely,    NATHANIEL NORTON MD   5/8/2025

## 2025-06-11 ENCOUNTER — TELEPHONE (OUTPATIENT)
Dept: OTOLARYNGOLOGY | Facility: CLINIC | Age: 60
End: 2025-06-11
Payer: COMMERCIAL

## 2025-07-01 ENCOUNTER — OFFICE VISIT (OUTPATIENT)
Dept: OTOLARYNGOLOGY | Facility: CLINIC | Age: 60
End: 2025-07-01
Payer: COMMERCIAL

## 2025-07-01 DIAGNOSIS — R05.3 CHRONIC COUGH: Primary | ICD-10-CM

## 2025-07-01 NOTE — PATIENT INSTRUCTIONS
"For throat clearing/cough suppression:  When you feel the need to clear your throat or cough (phlegm/mucus/tickle feeling), instead of throat clearing or coughing:  Swallow hard (like you're swallowing a golf ball)  Take sips of very hot or very cold liquid  Pay attention to whether the hot is trigger  Whispered \"eh eh eh\"  Slow inhale through your nose with stomach muscles unclenched, then gentle exhale on \"shhhh\" or with pursed lips     Repeat these in any order as many times as you need until the cough goes away. If you've been holding it back for several minutes and still feel the phlegm/mucus, give one gentle throat clear.    In addition practice explicitly for 5-10 minutes, 1x per day while intentionally exposing yourself to cough triggers (smell of wine/vinegar, pepper, etc).      "

## 2025-07-01 NOTE — LETTER
7/1/2025       RE: Mercedes Patel  [address]  [facility] [address]     Dear Colleague,    Thank you for referring your patient, Mercedes Patel, to the [facility]ICE [facility] [place] [facility]. Please see a copy of my visit note below.    THERAPY NOTE (CPT 77379)  Date of Service: 7/1/2025  Ms. Patel was seent today, 7/1/2025, for speech therapy session number 1 for treatment of chronic cough related to irritable larynx syndrome.    SUBJECTIVE / OBJECTIVE:  Since our most-recent session, Ms. Patel reports that she has had substantial reduction in cough since starting omeprazole.  She does occasionally (a few times per week) experience upper GI symptoms that may be related to reflux (sensation of bubbling air in chest).  These days, she coughs once or twice per day, and no longer has coughing fits.  This represents a substantial improvement from her baseline.    THERAPEUTIC ACTIVITIES  Counseling and Education  Asked many questions about the nature of her symptoms, and I answered all of these thoroughly.  Chronic cough / throat clearing reduction therapy  Identification of triggers:  Cold dry air  Strong food smells (wine, vinegar, pepper/spicy smells)  Body position  Sipping hot liquids (today only)  Suppression and substitution strategies were instructed including  Swallowing substitution techniques  Breathing suppression techniques to reduce laryngeal tension  Low impact glottic coup and soft cough  Slow nasal inhalation with unclenched stomach muscles followed by exhalation with slightly pursed lips  Additionally she was instructed to keep a log of what circumstances are eliciting cough / throat clear  Over the course of today's session, the patient practiced the above strategies with exposure to strong smells including cinnamon, bergamot oil, and hand .  She did feel a sensation of a tickle in her throat with the bergamot  oil, and in all situations was able to entirely suppress coughing and throat clearing with the above strategies.  No instances of coughing or throat clearing were observed in today's session.    ASSESSMENT/PLAN  PROGRESS TOWARD LONG TERM GOALS:   Adequate progress; please see above    IMPRESSIONS: Chronic Cough (R05.3) and Chronic Throat Clearing (R68.89) in the context of Laryngeal Hyperfunction (J38.7). Ms. Patel presents today with interval substantial improvement in frequency and severity of cough since initiating omeprazole.  In today session, she demonstrated excellent initial learning of cough and throat clearing suppression strategies and was able to complete today's entire therapy session with no instances of either coughing or throat clearing.    PLAN: I will see Ms. Patel in 2 weeks, at which time we will continue to work toward the plan of care.     Andrea Blanco, Ph.D., [facility]-SLP  Speech-Language Pathologist-[facility]  [phone]  he/him/his    Speech recognition software may have been used in the above documentation; input is reviewed before signature to the best of my ability.       Again, thank you for allowing me to participate in the care of your patient.      Sincerely,    Andrea Blanco, SLP

## 2025-07-01 NOTE — PROGRESS NOTES
Last office visit 8/30/2024     Next office visit scheduled Visit date not found    Requested Prescriptions     Pending Prescriptions Disp Refills    lisinopril-hydroCHLOROthiazide (PRINZIDE;ZESTORETIC) 10-12.5 MG per tablet [Pharmacy Med Name: LISINOPRIL-HCTZ 10-12.5 MG 10-12.5 Tablet] 90 tablet 2     Sig: TAKE 1 TABLET BY MOUTH DAILY        THERAPY NOTE (CPT 39407)  Date of Service: 7/1/2025  Ms. Patel was seent today, 7/1/2025, for speech therapy session number 1 for treatment of chronic cough related to irritable larynx syndrome.    SUBJECTIVE / OBJECTIVE:  Since our most-recent session, Ms. Patel reports that she has had substantial reduction in cough since starting omeprazole.  She does occasionally (a few times per week) experience upper GI symptoms that may be related to reflux (sensation of bubbling air in chest).  These days, she coughs once or twice per day, and no longer has coughing fits.  This represents a substantial improvement from her baseline.    THERAPEUTIC ACTIVITIES  Counseling and Education  Asked many questions about the nature of her symptoms, and I answered all of these thoroughly.  Chronic cough / throat clearing reduction therapy  Identification of triggers:  Cold dry air  Strong food smells (wine, vinegar, pepper/spicy smells)  Body position  Sipping hot liquids (today only)  Suppression and substitution strategies were instructed including  Swallowing substitution techniques  Breathing suppression techniques to reduce laryngeal tension  Low impact glottic coup and soft cough  Slow nasal inhalation with unclenched stomach muscles followed by exhalation with slightly pursed lips  Additionally she was instructed to keep a log of what circumstances are eliciting cough / throat clear  Over the course of today's session, the patient practiced the above strategies with exposure to strong smells including cinnamon, bergamot oil, and hand .  She did feel a sensation of a tickle in her throat with the bergamot oil, and in all situations was able to entirely suppress coughing and throat clearing with the above strategies.  No instances of coughing or throat clearing were observed in today's session.    ASSESSMENT/PLAN  PROGRESS TOWARD LONG TERM GOALS:   Adequate progress; please see above    IMPRESSIONS: Chronic Cough (R05.3)  and Chronic Throat Clearing (R68.89) in the context of Laryngeal Hyperfunction (J38.7). Ms. Patel presents today with interval substantial improvement in frequency and severity of cough since initiating omeprazole.  In today session, she demonstrated excellent initial learning of cough and throat clearing suppression strategies and was able to complete today's entire therapy session with no instances of either coughing or throat clearing.    PLAN: I will see Ms. Patel in 2 weeks, at which time we will continue to work toward the plan of care.     Andrea Blanco, Ph.D., [facility]-SLP  Speech-Language Pathologist-[facility]  [phone]  he/him/his    Speech recognition software may have been used in the above documentation; input is reviewed before signature to the best of my ability.

## 2025-07-14 DIAGNOSIS — I10 BENIGN ESSENTIAL HYPERTENSION WITH TARGET BLOOD PRESSURE BELOW 140/90: ICD-10-CM

## 2025-07-14 RX ORDER — LOSARTAN POTASSIUM 25 MG/1
25 TABLET ORAL DAILY
Qty: 30 TABLET | Refills: 7 | Status: SHIPPED | OUTPATIENT
Start: 2025-07-14

## 2025-07-15 ENCOUNTER — OFFICE VISIT (OUTPATIENT)
Dept: OTOLARYNGOLOGY | Facility: CLINIC | Age: 60
End: 2025-07-15
Payer: COMMERCIAL

## 2025-07-15 DIAGNOSIS — R05.3 CHRONIC COUGH: Primary | ICD-10-CM

## 2025-07-15 NOTE — LETTER
7/15/2025       RE: Mercedes Patel  [address]  [facility] [address]     Dear Colleague,    Thank you for referring your patient, Mercedes Patel, to the [facility] [place] at [facility]. Please see a copy of my visit note below.    THERAPY NOTE (CPT 08197)  Date of Service: 7/15/2025  Ms. Patel was seent today, 7/15/2025, for speech therapy session number 2 for treatment of chronic cough and chronic throat clearing related to irritable larynx syndrome.    SUBJECTIVE / OBJECTIVE:  Since our most-recent session, Ms. Patel reports that she continues to have good improvement in coughing with only 2-3 coughs per day.  Of note, these residual coughs come on very suddenly (no forewarning).  When she experiences a sensation that the cough might be coming on, she is almost always able to suppress it.    THERAPEUTIC ACTIVITIES  Counseling and Education  Asked many questions about the nature of her symptoms, and I answered all of these thoroughly.  Chronic cough / throat clearing reduction therapy  Suppression and substitution strategies were instructed including  Swallowing substitution techniques  Breathing suppression techniques to reduce laryngeal tension  Low impact glottic coup and soft cough  Cup and bubbles  Modal pitch was most helpful  Gargling with pitch glides and head turns  Sips of hot and cold liquid actually seems to worsen urge to throat clear  The above techniques were practiced following reading aloud for 90 seconds at a very loud loudness level to induce laryngeal irritation.  The patient was able to completely suppress coughing and throat clearing despite this.    ASSESSMENT/PLAN  PROGRESS TOWARD LONG TERM GOALS:   Adequate progress; too early for objective measures    IMPRESSIONS: Chronic Cough (R05.3) and Chronic Throat Clearing (R68.89). Ms. Patel presents today with interval maintenance of previously learned  strategies for cough and throat clearing suppression, and she continues to have only 2-3 coughs per day.  She does have some ongoing difficulties with globus sensation and associated urge to throat clear.  She was also able to suppress throat clearing throughout today's session, and we were able to reduce globus sensation with addition of voice exercises to suppression regimen (cup and bubbles, gargling, gentle humming).  We discussed that ongoing globus may also be a consequence of reflux; she has a follow-up with GI in October.  If it appears that reflux is now completely managed and globus sensation persists, we discussed that habitual voice use patterns may contribute, although this is less likely given the fact that talking does not worsen globus sensation and in fact not talking seems to make her more aware of the sensation.    PLAN: I will see Ms. Patel in 6 weeks, at which time we will continue to work toward the plan of care.     Andrea Blanco, Ph.D., [facility]-SLP  Speech-Language Pathologist-[facility]  [phone]  he/him/his    Speech recognition software may have been used in the above documentation; input is reviewed before signature to the best of my ability.       Again, thank you for allowing me to participate in the care of your patient.      Sincerely,    Andrea Blanco, SLP

## 2025-07-15 NOTE — PROGRESS NOTES
THERAPY NOTE (CPT 52204)  Date of Service: 7/15/2025  Ms. Patel was seent today, 7/15/2025, for speech therapy session number 2 for treatment of chronic cough and chronic throat clearing related to irritable larynx syndrome.    SUBJECTIVE / OBJECTIVE:  Since our most-recent session, Ms. Patel reports that she continues to have good improvement in coughing with only 2-3 coughs per day.  Of note, these residual coughs come on very suddenly (no forewarning).  When she experiences a sensation that the cough might be coming on, she is almost always able to suppress it.    THERAPEUTIC ACTIVITIES  Counseling and Education  Asked many questions about the nature of her symptoms, and I answered all of these thoroughly.  Chronic cough / throat clearing reduction therapy  Suppression and substitution strategies were instructed including  Swallowing substitution techniques  Breathing suppression techniques to reduce laryngeal tension  Low impact glottic coup and soft cough  Cup and bubbles  Modal pitch was most helpful  Gargling with pitch glides and head turns  Sips of hot and cold liquid actually seems to worsen urge to throat clear  The above techniques were practiced following reading aloud for 90 seconds at a very loud loudness level to induce laryngeal irritation.  The patient was able to completely suppress coughing and throat clearing despite this.    ASSESSMENT/PLAN  PROGRESS TOWARD LONG TERM GOALS:   Adequate progress; too early for objective measures    IMPRESSIONS: Chronic Cough (R05.3) and Chronic Throat Clearing (R68.89). Ms. Patel presents today with interval maintenance of previously learned strategies for cough and throat clearing suppression, and she continues to have only 2-3 coughs per day.  She does have some ongoing difficulties with globus sensation and associated urge to throat clear.  She was also able to suppress throat clearing throughout today's session, and we were able to reduce globus sensation  with addition of voice exercises to suppression regimen (cup and bubbles, gargling, gentle humming).  We discussed that ongoing globus may also be a consequence of reflux; she has a follow-up with GI in October.  If it appears that reflux is now completely managed and globus sensation persists, we discussed that habitual voice use patterns may contribute, although this is less likely given the fact that talking does not worsen globus sensation and in fact not talking seems to make her more aware of the sensation.    PLAN: I will see Ms. Patel in 6 weeks, at which time we will continue to work toward the plan of care.     Andrea Blanco, Ph.D., [facility]-SLP  Speech-Language Pathologist-[facility]  [phone]  he/him/his    Speech recognition software may have been used in the above documentation; input is reviewed before signature to the best of my ability.

## 2025-07-15 NOTE — PATIENT INSTRUCTIONS
"Strategies for when you feel the need to clear your throat:  Hard swallow  Whispered \"eh eh eh\" or \"em em em\"  Sips of hot and/or cold liquid  Cup and bubbles (see below)  Gentle humming  Slow breath in through nose and exhale with pursed lips    VOICE EXERCISES FOR WHEN YOU FEEL LIKE YOU WANT TO CLEAR YOUR THROAT   -- Inhale SILENTLY and feel your belly fill with air  --Put the straw about 1/2 to 1 inch in the water   --\"Whoooooo\" (smooth bubbles like a motor boat or a simmering pot of water)  3x silently with just air for 5-7 seconds  3x Air > Voice > Air > Voice, very gently and quietly alternating   3x foghorn (getting louder and softer) on single pitch for 5-7 seconds  3x sliding up and down like sirens  NOTE: If you hear the roughness/gravelliness come into your voice, make bigger bubbles  NOTE 2: If strained or tight, try it at a different pitch (higher or lower)    Gargling:  Small sip  Tilt head back and gargle with voice on  Turn head left and right  Make voice softer and repeat    Strategies for Surface Hydration  Humidifier next to bed at night  Breathe steam first thing in the morning  Either from a bowl/pan of hot water or just in the shower  You could consider taking this before bed  Nasal saline sprays    Gargling salt water or plain water (see above)    "

## 2025-08-12 DIAGNOSIS — E78.2 MIXED HYPERLIPIDEMIA: ICD-10-CM

## 2025-08-12 RX ORDER — ROSUVASTATIN CALCIUM 5 MG/1
5 TABLET, COATED ORAL DAILY
Qty: 90 TABLET | Refills: 3 | OUTPATIENT
Start: 2025-08-12

## (undated) DEVICE — GLOVE EXAM NITRILE LG PF LATEX FREE 5064

## (undated) DEVICE — KIT ENDO TURNOVER/PROCEDURE CARRY-ON 101822

## (undated) DEVICE — TUBING SUCTION MEDI-VAC 1/4"X20' N620A

## (undated) DEVICE — ENDO BITE BLOCK ADULT OMNI-BLOC

## (undated) DEVICE — SYR 50ML SLIP TIP W/O NDL 309654

## (undated) DEVICE — SPECIMEN CONTAINER 3OZ W/FORMALIN 59901

## (undated) DEVICE — SUCTION MANIFOLD NEPTUNE 2 SYS 1 PORT 702-025-000

## (undated) DEVICE — GOWN IMPERVIOUS 2XL BLUE

## (undated) DEVICE — SOL WATER IRRIG 500ML BOTTLE 2F7113

## (undated) RX ORDER — FENTANYL CITRATE 50 UG/ML
INJECTION, SOLUTION INTRAMUSCULAR; INTRAVENOUS
Status: DISPENSED
Start: 2024-06-04

## (undated) RX ORDER — LIDOCAINE HYDROCHLORIDE 20 MG/ML
JELLY TOPICAL
Status: DISPENSED
Start: 2024-12-12

## (undated) RX ORDER — ONDANSETRON 2 MG/ML
INJECTION INTRAMUSCULAR; INTRAVENOUS
Status: DISPENSED
Start: 2024-06-04

## (undated) RX ORDER — DIPHENHYDRAMINE HYDROCHLORIDE 50 MG/ML
INJECTION INTRAMUSCULAR; INTRAVENOUS
Status: DISPENSED
Start: 2024-06-04